# Patient Record
Sex: MALE | Race: BLACK OR AFRICAN AMERICAN
[De-identification: names, ages, dates, MRNs, and addresses within clinical notes are randomized per-mention and may not be internally consistent; named-entity substitution may affect disease eponyms.]

---

## 2019-06-08 ENCOUNTER — HOSPITAL ENCOUNTER (INPATIENT)
Dept: HOSPITAL 74 - JER | Age: 64
LOS: 6 days | Discharge: HOME | DRG: 377 | End: 2019-06-14
Attending: INTERNAL MEDICINE | Admitting: INTERNAL MEDICINE
Payer: COMMERCIAL

## 2019-06-08 VITALS — BODY MASS INDEX: 25 KG/M2

## 2019-06-08 DIAGNOSIS — K85.90: ICD-10-CM

## 2019-06-08 DIAGNOSIS — D64.9: ICD-10-CM

## 2019-06-08 DIAGNOSIS — K64.8: ICD-10-CM

## 2019-06-08 DIAGNOSIS — I10: ICD-10-CM

## 2019-06-08 DIAGNOSIS — F32.9: ICD-10-CM

## 2019-06-08 DIAGNOSIS — F41.9: ICD-10-CM

## 2019-06-08 DIAGNOSIS — K92.2: Primary | ICD-10-CM

## 2019-06-08 DIAGNOSIS — D62: ICD-10-CM

## 2019-06-08 DIAGNOSIS — R00.0: ICD-10-CM

## 2019-06-08 DIAGNOSIS — K63.5: ICD-10-CM

## 2019-06-08 DIAGNOSIS — F10.20: ICD-10-CM

## 2019-06-08 DIAGNOSIS — K57.30: ICD-10-CM

## 2019-06-08 DIAGNOSIS — K29.70: ICD-10-CM

## 2019-06-08 LAB
ALBUMIN SERPL-MCNC: 2.2 G/DL (ref 3.4–5)
ALP SERPL-CCNC: 38 U/L (ref 45–117)
ALT SERPL-CCNC: 13 U/L (ref 13–61)
ANION GAP SERPL CALC-SCNC: 9 MMOL/L (ref 8–16)
AST SERPL-CCNC: 20 U/L (ref 15–37)
BASOPHILS # BLD: 0.4 % (ref 0–2)
BASOPHILS # BLD: 0.6 % (ref 0–2)
BILIRUB SERPL-MCNC: 0.3 MG/DL (ref 0.2–1)
BUN SERPL-MCNC: 22.7 MG/DL (ref 7–18)
CALCIUM SERPL-MCNC: 7.4 MG/DL (ref 8.5–10.1)
CHLORIDE SERPL-SCNC: 107 MMOL/L (ref 98–107)
CO2 SERPL-SCNC: 22 MMOL/L (ref 21–32)
CREAT SERPL-MCNC: 1.3 MG/DL (ref 0.55–1.3)
DEPRECATED RDW RBC AUTO: 17.4 % (ref 11.9–15.9)
DEPRECATED RDW RBC AUTO: 18.2 % (ref 11.9–15.9)
EOSINOPHIL # BLD: 0.7 % (ref 0–4.5)
EOSINOPHIL # BLD: 0.9 % (ref 0–4.5)
GLUCOSE SERPL-MCNC: 169 MG/DL (ref 74–106)
HCT VFR BLD CALC: 11.9 % (ref 35.4–49)
HCT VFR BLD CALC: 17.6 % (ref 35.4–49)
HGB BLD-MCNC: 4 GM/DL (ref 11.7–16.9)
HGB BLD-MCNC: 5.9 GM/DL (ref 11.7–16.9)
INR BLD: 1.23 (ref 0.83–1.09)
LIPASE SERPL-CCNC: 4132 U/L (ref 73–393)
LYMPHOCYTES # BLD: 16.3 % (ref 8–40)
LYMPHOCYTES # BLD: 17.9 % (ref 8–40)
MCH RBC QN AUTO: 30 PG (ref 25.7–33.7)
MCH RBC QN AUTO: 31.4 PG (ref 25.7–33.7)
MCHC RBC AUTO-ENTMCNC: 33.2 G/DL (ref 32–35.9)
MCHC RBC AUTO-ENTMCNC: 33.9 G/DL (ref 32–35.9)
MCV RBC: 90.5 FL (ref 80–96)
MCV RBC: 92.7 FL (ref 80–96)
MONOCYTES # BLD AUTO: 6.4 % (ref 3.8–10.2)
MONOCYTES # BLD AUTO: 7.8 % (ref 3.8–10.2)
NEUTROPHILS # BLD: 73.2 % (ref 42.8–82.8)
NEUTROPHILS # BLD: 75.8 % (ref 42.8–82.8)
PLATELET # BLD AUTO: 186 K/MM3 (ref 134–434)
PLATELET # BLD AUTO: 371 K/MM3 (ref 134–434)
PLATELET BLD QL SMEAR: ADEQUATE
PMV BLD: 8.2 FL (ref 7.5–11.1)
PMV BLD: 8.5 FL (ref 7.5–11.1)
POTASSIUM SERPLBLD-SCNC: 3.8 MMOL/L (ref 3.5–5.1)
PROT SERPL-MCNC: 4.6 G/DL (ref 6.4–8.2)
PT PNL PPP: 14.5 SEC (ref 9.7–13)
RBC # BLD AUTO: 1.28 M/MM3 (ref 4–5.6)
RBC # BLD AUTO: 1.95 M/MM3 (ref 4–5.6)
SODIUM SERPL-SCNC: 138 MMOL/L (ref 136–145)
WBC # BLD AUTO: 12.4 K/MM3 (ref 4–10)
WBC # BLD AUTO: 9.8 K/MM3 (ref 4–10)

## 2019-06-08 PROCEDURE — P9038 RBC IRRADIATED: HCPCS

## 2019-06-08 PROCEDURE — 30233N1 TRANSFUSION OF NONAUTOLOGOUS RED BLOOD CELLS INTO PERIPHERAL VEIN, PERCUTANEOUS APPROACH: ICD-10-PCS | Performed by: INTERNAL MEDICINE

## 2019-06-08 PROCEDURE — P9058 RBC, L/R, CMV-NEG, IRRAD: HCPCS

## 2019-06-08 RX ADMIN — SODIUM CHLORIDE, POTASSIUM CHLORIDE, SODIUM LACTATE AND CALCIUM CHLORIDE SCH MLS/HR: 600; 310; 30; 20 INJECTION, SOLUTION INTRAVENOUS at 23:02

## 2019-06-08 RX ADMIN — SODIUM CHLORIDE, POTASSIUM CHLORIDE, SODIUM LACTATE AND CALCIUM CHLORIDE SCH MLS/HR: 600; 310; 30; 20 INJECTION, SOLUTION INTRAVENOUS at 13:11

## 2019-06-08 RX ADMIN — PANTOPRAZOLE SODIUM SCH MLS/HR: 40 INJECTION, POWDER, FOR SOLUTION INTRAVENOUS at 10:05

## 2019-06-08 RX ADMIN — PANTOPRAZOLE SODIUM SCH MLS/HR: 40 INJECTION, POWDER, FOR SOLUTION INTRAVENOUS at 13:33

## 2019-06-08 RX ADMIN — MUPIROCIN SCH APPLIC: 20 OINTMENT TOPICAL at 23:00

## 2019-06-08 RX ADMIN — TRAZODONE HYDROCHLORIDE SCH MG: 100 TABLET ORAL at 22:54

## 2019-06-08 RX ADMIN — CHLORHEXIDINE GLUCONATE SCH APPLIC: 213 SOLUTION TOPICAL at 22:59

## 2019-06-08 RX ADMIN — MUPIROCIN SCH APPLIC: 20 OINTMENT TOPICAL at 23:01

## 2019-06-08 NOTE — PDOC
Attending Attestation





- Resident


Resident Name: Juan Bird





- ED Attending Attestation


I have performed the following: I have examined & evaluated the patient, The 

case was reviewed & discussed with the resident, I agree w/resident's findings 

& plan, Exceptions are as noted





- HPI


HPI: 





06/08/19 09:07


63 years old with past medical history significant for hypertension and anxiety 

no previous history of GI bleed not on any anticoagulation presents to the 

emergency Department with three-day history of bright red blood per rectum. 

Blood is mixed with stool approximately 1-2 episodes per day. Patient now 

feeling lightheaded with some chest discomfort and generalized weakness. 

Symptoms are moderate persistent constant with no exacerbating or relieving 

factors. No history of similar.








- Physicial Exam


PE: 





06/08/19 09:07








Vitals: Triage Vital signs reviewed  


General Appearance:  no acute distress, well nourished well developed, 


Neck:  Supple;No Nucal rigidity


Chest Wall: Nontender


Cardiac: Regular rate and rhythym, no murmurs, no rubs, no gallops, 


Lungs: Clear to auscultation bilateral, good air movement bilaterally,


Abdomen:  Soft, non distended, normal bowel sounds, non tender to palpation


Rectal: Bright red blood per rectum mixed with dark stool


Extremities: Full range of motion to all extremities, no cyanosis, clubbing, or 

edema


Skin:  Warm and dry, no rashes or lesions, no rash, no petechiae


Psych:  normal mood, normal affect








- Critical Care Time


Total Critical Care Time: 35


Critical Care Statement: The care of this patient involved high complexity 

decision making to prevent further life threatening deterioration of the patient

's condition and/or to evaluate & treat vital organ system(s) failure or risk 

of failure.





- Medical Decision Making





06/08/19 09:08





History examination consistent with lower GI bleed





2 IVs placed





Patient noted to be tachycardic





Labs notable for hemoglobin of 4.





2 units PRBCs ordered





GI consultation placed, patient [facility] no previous 

colonoscopy or endoscopy in the past.





ICU consulted for higher level monitoring








**Heart Score/ECG Review





- ECG Impressions


Comment:: 





06/08/19 14:48


EKG performed at 7:55 AM demonstrates sinus tachycardia 113 bpm. No ST 

elevations or T-wave inversions will access. ME interval 150, QRS 72, QTC 45.





Interpreted by me.

## 2019-06-08 NOTE — CON.GI
Consult





- History of Present Illness


History of Present Illness: 





GI CONSULT DICTATED 





- NPO / IVF'S 


- PPI INFUSION 


- TRANSFUSE PRBC TO HG OF 9 -10 


- POST TRANSFUSION CBC ; SERIAL CBC 


- ICU CONSULT 


- WILL PLAN FOR DIAGNOSTIC EGD ONCE THE PATIENT IS ADEQUATELY RESUSCITATED





SEE FULL CONSULT.








- Alcohol/Substance Use


Hx Alcohol Use: Yes





- Smoking History


Smoking history: Never smoked


Have you smoked in the past 12 months: No





Home Medications





- Allergies


Allergies/Adverse Reactions: 


 Allergies











Allergy/AdvReac Type Severity Reaction Status Date / Time


 


No Known Allergies Allergy   Verified 06/08/19 07:46














- Home Medications


Home Medications: 


Ambulatory Orders





Unobtainable  06/08/19 











Physical Exam-GI


Vital Signs: 


 Vital Signs











Temperature  98.3 F   06/08/19 07:27


 


Pulse Rate  105 H  06/08/19 09:30


 


Respiratory Rate  18   06/08/19 09:30


 


Blood Pressure  98/56 L  06/08/19 09:30


 


O2 Sat by Pulse Oximetry (%)  100   06/08/19 07:27











Labs: 


 CBC, BMP





 06/08/19 07:49 





 06/08/19 07:49 





 INR, PTT











INR  1.23  (0.83-1.09)  H  06/08/19  07:49

## 2019-06-08 NOTE — HP
CHIEF COMPLAINT: dizziness, fatigue, dark stools with bright red blood





[facility]





HISTORY OF PRESENT ILLNESS:


63 yom with PMHx of HTN, anxiety, major depression requiring inpatient psych 7 

years ago, Alcohol abuse (sips vodka through the day), comes with onset of dark 

stools 2 days ago, Has been feeling weak and fatigued, with poor oral intake 

over last 2 days. Today when to the bathroom noted dark stools with massive 

bloody BM, felt severely weak, fatigued, slipped on his own blood, so came to 

the ED. Denies any head trauma.


Patient reports long standing h/o ETOH abuse dating back 2012, was in rehab few 

months ago, but relapsed. Reports upper endoscopy within the last 1 year, 

reportedly unremarkable which he had for GERD like symptoms. Denies any NSAIDS 

use. 


Reports some epigastric discomfort today. 


Reports last colonoscopy in 2012, no famHx of GI malignancy. 


12 point ROS done, as above, Denies any SI/HI currently. No fevers/chills, 

urinary symptoms. 





ER course was notable for:


(1) 2 large bore IVs,


(2) PRBC


(3) GI and ICU consult





Recent Travel: Denies





PAST MEDICAL HISTORY:  HTN, Anxiety, Major depression,  Alcohol abuse





PAST SURGICAL HISTORY: Denies





Social History:


Smoking: remote few cig a day from teens to 1989


Alcohol: "sips vodka throughout the day" 


Drugs: sniffed crack cocaine years ago, none since 2007


Lives alone, no family around





Family History:


Allergies





No Known Allergies Allergy (Verified 06/08/19 07:46)


 


HOME MEDICATIONS:


REVIEW OF SYSTEMS


12 point ROS done, per HPI








PHYSICAL EXAMINATION


 Vital Signs - 24 hr











  06/08/19 06/08/19





  07:27 09:30


 


Temperature 98.3 F 


 


Pulse Rate 112 H 


 


Pulse Rate [  105 H





Left Radial]  


 


Respiratory 16 18





Rate  


 


Blood Pressure 111/56 L 


 


Blood Pressure  98/56 L





[Right Arm]  


 


O2 Sat by Pulse 100 





Oximetry (%)  








 Intake & Output











 06/05/19 06/06/19 06/07/19 06/08/19





 23:59 23:59 23:59 23:59


 


Weight    190 lb











GENERAL: Awake,pos pallor, weak looking, fatigued, short of breath with minimal 

activity


HEAD: Normal with no signs of trauma.


EYES: Pupils equal, round and reactive to light, extraocular movements intact, 

sclera anicteric, conjunctiva clear. No lid lag.


EARS, NOSE, THROAT: Ears normal, nares patent, oropharynx clear without 

exudates. dry mucous membrane


NECK: Normal range of motion, supple, no JVD


LUNGS: Breath sounds equal, clear to auscultation bilaterally. No wheezes, and 

no crackles. No accessory muscle use.


HEART: S1S2 regular tachycardic


ABDOMEN: Soft, mild epigastric tenderness, no voluntary or involuntary guarding 

or rigidity, pos bowel sounds


RECTAL: Dried blood across the thigh, multiple large clots with fresh blood in 

the rectal area, dark stools, some ck-rectal erythema


MUSCULOSKELETAL: Normal range of motion at all joints. No bony deformities or 

tenderness. No CVA tenderness.


UPPER EXTREMITIES: 2+ pulses, warm, well-perfused. No cyanosis. No clubbing. No 

peripheral edema.


LOWER EXTREMITIES: 2+ pulses, warm, well-perfused. No calf tenderness. No 

peripheral edema. 


NEUROLOGICAL:  AAOx3, Cranial nerves II-XII grossly intact. Normal speech. Gait 

not observed


PSYCHIATRIC: Cooperative. Good eye contact. Appropriate mood and affect.


SKIN: Warm, dry, decreased turgor, no rashes or lesions noted, normal capillary 

refill. 


 Laboratory Results - last 24 hr











  06/08/19 06/08/19 06/08/19





  07:35 07:49 07:49


 


WBC   9.8 


 


RBC   1.28 L 


 


Hgb   4.0 L* 


 


Hct   11.9 L 


 


MCV   92.7 


 


MCH   31.4 


 


MCHC   33.9 


 


RDW   18.2 H 


 


MPV   8.5 


 


Absolute Neuts (auto)   7.5 


 


Neutrophils %   75.8 


 


Lymphocytes %   16.3 


 


Monocytes %   6.4 


 


Eosinophils %   0.9 


 


Basophils %   0.6 


 


Nucleated RBC %   0 


 


PT with INR    14.50 H


 


INR    1.23 H


 


Sodium   


 


Potassium   


 


Chloride   


 


Carbon Dioxide   


 


Anion Gap   


 


BUN   


 


Creatinine   


 


Est GFR (CKD-EPI)AfAm   


 


Est GFR (CKD-EPI)NonAf   


 


Random Glucose   


 


Calcium   


 


Total Bilirubin   


 


AST   


 


ALT   


 


Alkaline Phosphatase   


 


Creatine Kinase   


 


Troponin I   


 


Total Protein   


 


Albumin   


 


Stool Occult Blood  Positive  


 


Blood Type   


 


Antibody Screen   


 


Crossmatch   














  06/08/19 06/08/19 06/08/19





  07:49 07:49 09:21


 


WBC   


 


RBC   


 


Hgb   


 


Hct   


 


MCV   


 


MCH   


 


MCHC   


 


RDW   


 


MPV   


 


Absolute Neuts (auto)   


 


Neutrophils %   


 


Lymphocytes %   


 


Monocytes %   


 


Eosinophils %   


 


Basophils %   


 


Nucleated RBC %   


 


PT with INR   


 


INR   


 


Sodium  138  


 


Potassium  3.8  


 


Chloride  107  


 


Carbon Dioxide  22  


 


Anion Gap  9  


 


BUN  22.7 H  


 


Creatinine  1.3  


 


Est GFR (CKD-EPI)AfAm  67.78  


 


Est GFR (CKD-EPI)NonAf  58.48  


 


Random Glucose  169 H  


 


Calcium  7.4 L  


 


Total Bilirubin  0.3  


 


AST  20  


 


ALT  13  


 


Alkaline Phosphatase  38 L  


 


Creatine Kinase  101  


 


Troponin I  < 0.02  


 


Total Protein  4.6 L  


 


Albumin  2.2 L  


 


Stool Occult Blood   


 


Blood Type   A POSITIVE  A POSITIVE


 


Antibody Screen   Negative 


 


Crossmatch   See Detail 








CXR - no acute process


EKG sinus tach 112





ASSESSMENT/PLAN:


63 yom with PMHx of HTN, ETOH abuse, Anxiety, major depression admitted with 

acute gastrointestinal haemorrhage with hemodynamic instability.





-Acute massive gastrointestinal haemorrhage, Esophageal varices vs PUD, r/o mass

, vs AVM, low suspicion for diverticular, haemorrhoidal


-Acute severe blood loss anemia


-h/o HTN


-Anxiety


-Major depression


-h/o Suicidal ideation requiring inpatient psychiatric hospitalization





Plan:


2 large bore IVs,


Protonix/Octreotide drip, Admit to ICU.


Transfuse 2 units PRBC, Aggressive volume resuscitation.


Case discussed in detail with GI Dr. Álvarez at 11:14 AM, notified of current 

rectal exam, Hb 4, my concerns for ongoing gastrointestinal haemorrhage with 

severe blood loss, ongoing hemodynamic concerns with tachycardia and SBP 90s-

100s in this patient with known h/o HTN and significant h/o ETOH abuse and 

urgency of the situation was expressed. 


CT A/P with contrast (discussed with ED Dr Bird)


Currently on nifedipine/metoprolol/buspiron/buproprion, unsure of doses. Hold 

for now


DVTPPX SCDs


Dispo Admit to ICU.


Plan discussed with patient in detail, all questions answered. 


Care co-ordinated with ED and GI


Total critical care time spent 65 min. 











Visit type





- Emergency Visit


Emergency Visit: Yes


ED Registration Date: 06/08/19


Care time: The patient presented to the Emergency Department on the above date 

and was hospitalized for further evaluation of their emergent condition.





- New Patient


This patient is new to me today: Yes


Date on this admission: 06/08/19





- Critical Care


Critical Care patient: Yes


Total Critical Care Time (in minutes): 65


Critical Care Statement: The care of this patient involved high complexity 

decision making to prevent further life threatening deterioration of the patient

's condition and/or to evaluate & treat vital organ system(s) failure or risk 

of failure.

## 2019-06-08 NOTE — CONSULT
Consultation: 


REQUESTING PROVIDER: Dr. De Luna





CONSULT REQUEST: GI bleed





HISTORY OF PRESENT ILLNESS:





63 year old male with a history of hypertension, anxiety, alcohol abuse, and 

questionable [facility] presents to [facility] hospital 

for a 3 day history of bright red blood per rectum. Patient reports that he 

began seeing blood around the stool on Thursday with every bowel movement. 

States that since then, his bowel movements are composed of solid stools and he 

denies overt blood in the toilet. States that this has never happened to him 

before. Reports mild epigastric abdominal pain, lightheadedness and weakness. 

Denies any nausea, vomiting, diarrhea, fevers or chills. Denies any NSAID use 

or liver pathology in the past. Reports drinking 1 liter of vodka every day for 

many years. Denies family history of colon cancer.





Reports having an EGD/colonoscopy in 2011 which was normal.





[facility] and states that he had fluid in 

the lung, for which he was tapped, but he does not recall his diagnosis.





Denies Smoking


Former user of cocaine, hasn't used in many years


Daily alcohol 1 L of vodka, in a rehab program


Surgeries: Hip replacement, no abdominal surgeries


Occupation: former  and , now retired.








REVIEW OF SYSTEMS:


CONSTITUTIONAL: generalized weakness


Absent:  fever, chills, diaphoresis,  malaise, loss of appetite, weight change


HEENT: 


Absent:  rhinorrhea, nasal congestion, throat pain, throat swelling, difficulty 

swallowing, mouth swelling, ear pain, eye pain, visual changes


CARDIOVASCULAR: 


Absent: chest pain, syncope, palpitations, irregular heart rate, lightheadedness

, peripheral edema


RESPIRATORY: 


Absent: cough, shortness of breath, dyspnea with exertion, orthopnea, wheezing, 

stridor, hemoptysis


GASTROINTESTINAL: abdominal pain, hematochezia


Absent: abdominal distension, nausea, vomiting, diarrhea, constipation, melena


GENITOURINARY: 


Absent: dysuria, frequency, urgency, hesitancy, hematuria, flank pain, genital 

pain


MUSCULOSKELETAL: 


Absent: myalgia, arthralgia, joint swelling, back pain, neck pain


SKIN: 


Absent: rash, itching, pallor


HEMATOLOGIC/IMMUNOLOGIC: 


Absent: easy bleeding, easy bruising, lymphadenopathy, frequent infections


ENDOCRINE:


Absent: unexplained weight gain, unexplained weight loss, heat intolerance, 

cold intolerance


NEUROLOGIC: 


Absent: headache, focal weakness or paresthesias, dizziness, unsteady gait, 

seizure, mental status changes, bladder or bowel incontinence


PSYCHIATRIC: 


Absent: anxiety, depression, suicidal or homicidal ideation, hallucinations.





PHYSICAL EXAMINATION





 Vital Signs - 24 hr











  06/08/19 06/08/19





  07:27 09:30


 


Temperature 98.3 F 


 


Pulse Rate 112 H 


 


Pulse Rate [  105 H





Left Radial]  


 


Respiratory 16 18





Rate  


 


Blood Pressure 111/56 L 


 


Blood Pressure  98/56 L





[Right Arm]  


 


O2 Sat by Pulse 100 





Oximetry (%)  














GENERAL: A&Ox3, no acute distress


EYES: PERRLA, EOMI, sclera pale


ENT: Dry mucus membranes, no blood in the pharynx


NECK: No JVD


LUNGS: CTA, no wheezes


HEART: tachycardic, no murmurs


ABDOMEN: Soft, mildly tender to palpation in the epigastrum, BS present


MUSCULOSKELETAL: No CVA Tenderness


EXTREMITIES: 2+ pulses, no edema.


NEUROLOGICAL:  Cranial nerves II-XII intact, no focal deficits


RECTAL: blood in rectal vault, no hemorrhoids noted











 Laboratory Results - last 24 hr











  06/08/19 06/08/19 06/08/19





  07:35 07:49 07:49


 


WBC   9.8 


 


RBC   1.28 L 


 


Hgb   4.0 L* 


 


Hct   11.9 L 


 


MCV   92.7 


 


MCH   31.4 


 


MCHC   33.9 


 


RDW   18.2 H 


 


MPV   8.5 


 


Absolute Neuts (auto)   7.5 


 


Neutrophils %   75.8 


 


Lymphocytes %   16.3 


 


Monocytes %   6.4 


 


Eosinophils %   0.9 


 


Basophils %   0.6 


 


Nucleated RBC %   0 


 


PT with INR    14.50 H


 


INR    1.23 H


 


Sodium   


 


Potassium   


 


Chloride   


 


Carbon Dioxide   


 


Anion Gap   


 


BUN   


 


Creatinine   


 


Est GFR (CKD-EPI)AfAm   


 


Est GFR (CKD-EPI)NonAf   


 


Random Glucose   


 


Calcium   


 


Total Bilirubin   


 


AST   


 


ALT   


 


Alkaline Phosphatase   


 


Total Protein   


 


Albumin   


 


Stool Occult Blood  Positive  


 


Blood Type   


 


Antibody Screen   


 


Crossmatch   














  06/08/19 06/08/19 06/08/19





  07:49 07:49 09:21


 


WBC   


 


RBC   


 


Hgb   


 


Hct   


 


MCV   


 


MCH   


 


MCHC   


 


RDW   


 


MPV   


 


Absolute Neuts (auto)   


 


Neutrophils %   


 


Lymphocytes %   


 


Monocytes %   


 


Eosinophils %   


 


Basophils %   


 


Nucleated RBC %   


 


PT with INR   


 


INR   


 


Sodium  138  


 


Potassium  3.8  


 


Chloride  107  


 


Carbon Dioxide  22  


 


Anion Gap  9  


 


BUN  22.7 H  


 


Creatinine  1.3  


 


Est GFR (CKD-EPI)AfAm  67.78  


 


Est GFR (CKD-EPI)NonAf  58.48  


 


Random Glucose  169 H  


 


Calcium  7.4 L  


 


Total Bilirubin  0.3  


 


AST  20  


 


ALT  13  


 


Alkaline Phosphatase  38 L  


 


Total Protein  4.6 L  


 


Albumin  2.2 L  


 


Stool Occult Blood   


 


Blood Type   A POSITIVE  A POSITIVE


 


Antibody Screen   Negative 


 


Crossmatch   See Detail 








Active Medications











Generic Name Dose Route Start Last Admin





  Trade Name Freq  PRN Reason Stop Dose Admin


 


Chlorhexidine Gluconate  1 applic  06/08/19 22:00  





  Hibiclens For Decolonization -  TP   





  HS SILVANA   





     





     





     





     


 


Pantoprazole Sodium 80 mg/  100 mls @ 10 mls/hr  06/08/19 09:30  06/08/19 10:05





  Sodium Chloride  IVPB   10 mls/hr





  Q10H SILVANA   Administration





     





     





     





  8 MG/HR   


 


Octreotide Acetate 200 mcg/  500 mls @ 20.833 mls/hr  06/08/19 11:00  





Octreotide Acetate 1,000 mcg/  IVPB   





Dextrose  ASDIR SILVANA   





     





     





     





     


 


Lactated Ringer's  1,000 ml in 1,000 mls @ 125 mls/hr  06/08/19 11:00  





  Lactated Ringers Solution  IV   





  ASDIR SILVANA   





     





     





     





     


 


Mupirocin  1 applic  06/08/19 22:00  





  Bactroban Ointment (For Decolonization) -  NS  06/13/19 21:59  





  BID SILVANA   





     





     





     





     











ASSESSMENT/PLAN:





63 year old male with a history of hypertension, anxiety, alcohol abuse, and 

questionable pleural [facility] presents to the hospital 

for a 3 day history of bright red blood per rectum. 





#Neuro


-A&O, no neurologic issues at present


-CIWA 0, monitor for withdrawal symptoms





#Cardiovascular


-tachycardic, likely 2/2 acute blood loss anemia from GI bleed


-EKG shows sinus tachycardia with a rate of 113, QTc 485


-patient has a history of hypertension, would hold antihypertensives in setting 

of GI bleed





#Pulmonary


-no active issues


-patient reportedly had [facility] month ago for fluid in the 

lungs, we do not have records of this





#Gastrointestinal: 


-patient has an acute GI bleed, possibly a brisk upper GI bleed from varices 

due to alcohol abuse/cirrhosis vs peptic ulcer disease


-two large bore IVs


-hemodynamically stable at present, monitor blood pressures


-aggressive fluid resuscitation


-2U PRBCs ordered


-type and screen/coags done


-repeat H&H after first and second unit


-protonix ggt


-octreotide ggt


-ICU monitoring


-GI consulted





#Prophylaxis


-recommend SCDS





Dispo: We will continue to follow the patient. Thank you for this consultative 

opportunity.











Visit type





- Emergency Visit


Emergency Visit: Yes


ED Registration Date: 06/08/19


Care time: The patient presented to the Emergency Department on the above date 

and was hospitalized for further evaluation of their emergent condition.





- New Patient


This patient is new to me today: Yes


Date on this admission: 06/08/19





- Critical Care


Critical Care patient: Yes


Total Critical Care Time (in minutes): 36


Critical Care Statement: The care of this patient involved high complexity 

decision making to prevent further life threatening deterioration of the patient

's condition and/or to evaluate & treat vital organ system(s) failure or risk 

of failure.

## 2019-06-08 NOTE — PDOC
History of Present Illness





- General


Chief Complaint: Rectal Bleed


Stated Complaint: RECTAL BLEEDING


Time Seen by Provider: 06/08/19 07:39





- History of Present Illness


Initial Comments: 





06/08/19 08:56


The patient is a 63 year old male with a history of HTN, anxiety who presents 

for evaluation of rectal bleeding.  The patient notes a 3 day history of bright 

red in his stool with black colored stool as well.  He notes that he has been 

experiencing blood in his stool with every bowel movement.  He states that today

, he began feeling extremely fatigued prompting his presentation to the ED for 

further evaluation.  He denies ever experiencing rectal bleeding in the past 

and otherwise denies fevers, chills, SOB, chest pain, nausea, vomiting, 

abdominal pain, or changes with urination.  Of note, the patient reports almost 

daily alcohol use but is unsure of the amount of alcohol.





Past History





- Past Medical History


Allergies/Adverse Reactions: 


 Allergies











Allergy/AdvReac Type Severity Reaction Status Date / Time


 


No Known Allergies Allergy   Verified 06/08/19 07:46











COPD: No


HTN: Yes


Psychiatric Problems: Yes (anxiety)


Other medical history: ETOH





- Suicide/Smoking/Psychosocial Hx


Smoking History: Never smoked


Have you smoked in the past 12 months: No


Information on smoking cessation initiated: No


Hx Alcohol Use: Yes


Drug/Substance Use Hx: No





**Review of Systems





- Review of Systems


Comments:: 





06/08/19 09:15


Constitutional: Fatigue.  No fevers, chills, malaise


HEENT: No Rhinorrhea, nasal congestion, visual changes


Cardiovascular: No chest pain, syncope, palpitations, lightheadedness


Respiratory: No Cough, SOB, Hemoptysis,


Gastrointestinal: BRBPR, Melena.  No Abdominal pain, Nausea, Vomiting, 

Constipation, Diarrhea,


Genitourinary: No Dysuria, Frequency, Urgency, Hesitancy, Hematuria, Flank pain


Musculoskeletal: No Myalgia, arthralgia


Skin: Pallor.  No rashes, itching, bruising, 


Neurologic: No Headache, Dizziness, Numbness, Weakness, or Tingling


Psychiatric: No Hallucinations. No SI or HI








*Physical Exam





- Vital Signs


 Last Vital Signs











Temp Pulse Resp BP Pulse Ox


 


 98.3 F   112 H  16   111/56 L  100 


 


 06/08/19 07:27  06/08/19 07:27  06/08/19 07:27  06/08/19 07:27  06/08/19 07:27














- Physical Exam


Comments: 





06/08/19 09:16


General Appearance: Nourished. No Apparent Distress


HEENT: EOMI, INDU. Conjuctival pallor noted on exam.   No Pharyngeal Erythema, 

Tonsillar Exudate, Tonsillar Erythema


Neck: No Cervical Lymphadenopathy


Respiratory/Chest: Lungs Clear, Normal Breath Sounds. No Crackles, Rales, 

Rhonchi, Wheezing


Cardiovascular: Regular Rhythm, Tachycardic  Rate. No Murmur, Gallops, Rubs


Gastrointestinal/Abdominal: Normal Bowel Sounds, Soft. No Guarding, Rebound, 

Tenderness


Rectal Exam: Significant amount of dark red blood noted around the rectum.  

Dark red stool on rectal exam.  No notable internal or external hemorrhoids.  

No fissures noted.


Musculoskeletal: No CVA Tenderness


Extremity: Normal Capillary Refill


Integumentary: Pallor noted on exam.  Dry, Warm


Neurologic:  Fully Oriented, Alert, Normal Mood/Affect, Normal Response, 





**Heart Score/ECG Review


  ** #1


ECG reviewed & interpreted by me at: 10:22





06/08/19 10:22


Sinus Tachycardic


No acute ST Changes








QRS 72


QTc 485





ED Treatment Course





- LABORATORY


CBC & Chemistry Diagram: 


 06/08/19 07:49





 06/08/19 07:49





Medical Decision Making





- Medical Decision Making





06/08/19 09:19


The patient is a 63 year old male with a history of HTN, anxiety who presents 

for evaluation of rectal bleeding.  Differential includes but is not limited to

: GI bleed, Anemia, Infectious, Metabolic Derangement.  Given the patient's 

history and physical exam, we will obtain a cbc, cmp, coags, type and screen, 

stool of occult blood, ekg, chest plain film to evaluate further.  We will 

continue to monitor and reassess while here in the ED.





06/08/19 09:37


CBC demonstrates a hgb of 4.  CMP is unremarkable.  Stool for occult blood is 

positive.  Chest plain film is unremarkable as read by our radiologist.  The 

patient's symptoms are likely due to symptomatic anemia from a GI bleed.  Given 

the patient's vital signs abnormalities and significant anemia, the patient 

will require admission with ICU monitoring.  We discussed the case with Dr. Álvarez with GI who is aware of the case and recommends a protonix drip.  We 

will transfuse the patient with 2 units of PRBC.  We will continue to monitor 

and reassess while here in the ED.





06/08/19 11:06


We discussed the case with the ICU team who accepted the patient for ICU.  We 

discussed the case with the admitting team who accepted the patient for 

admission.





*DC/Admit/Observation/Transfer


Diagnosis at time of Disposition: 


GI bleed


Qualifiers:


 GI bleed type/associated pathology: unspecified gastrointestinal hemorrhage 

type Qualified Code(s): K92.2 - Gastrointestinal hemorrhage, unspecified





Anemia


Qualifiers:


 Anemia type: unspecified type Qualified Code(s): D64.9 - Anemia, unspecified








- Discharge Dispostion


Condition at time of disposition: Guarded


Decision to Admit order: Yes





- Referrals





- Patient Instructions





- Post Discharge Activity

## 2019-06-08 NOTE — EKG
Test Reason : 

Blood Pressure : ***/*** mmHG

Vent. Rate : 113 BPM     Atrial Rate : 113 BPM

   P-R Int : 150 ms          QRS Dur : 072 ms

    QT Int : 354 ms       P-R-T Axes : 056 046 055 degrees

   QTc Int : 485 ms

 

SINUS TACHYCARDIA

OTHERWISE NORMAL ECG

NO PREVIOUS ECGS AVAILABLE

Confirmed by [place] ELDER, HILDA ([id number]) on 6/8/2019 8:48:44 AM

 

Referred By:             Confirmed By:HILDA BOYLE MD

## 2019-06-08 NOTE — CONS
DATE OF CONSULTATION:  06/08/2019

 

The patient is a 63-year-old man with a past medical history of hypertension, 
anxiety

disorder, and major depression, also with alcohol abuse.  He says he drinks a 
small

amount of vodka daily apparently for at least the last 5 years.  He was in an

outpatient alcohol rehab facility in the past.  He presents to the hospital with

complaints of blood in the stool for the past couple of days.  He states he has

noticed some bright red blood and some dark stool.  Apparently after his last 
bloody

bowel movement he became very weak and slipped on his own blood, prompting him 
to

come to the emergency room for further evaluation.  While in the emergency room 
he

has not had any further episodes of melena or hematochezia.  Apparently he had 
an

upper endoscopy a year ago which he reports to be normal.  Also with a 
colonoscopy in

2012, which he also reports to be normal.  He admits to taking some aspirin 
over the

past couple of weeks but denies any other NSAID use.  He also complains of some

epigastric discomfort which has been ongoing for the past couple of weeks.  He 
denies

any nausea, vomiting or hematemesis or syncope or shortness of breath or chest 
pain.

 

PAST MEDICAL/SURGICAL HISTORY:  As listed in the HPI.

 

ALLERGIES:  No known drug allergies.  

 

SOCIAL HISTORY:  Smokes a few cigarettes daily, drinks alcohol.  The exact 
quantity

is not known.  He appears to be somewhat unreliable.  Drugs:  He used to use 
cocaine

in the past, none recently, and he lives alone.  

 

HOME MEDICATIONS:  Denies.  

 

REVIEW OF SYSTEMS:  As per the HPI.

 

PHYSICAL EXAMINATION:  

Vital Signs:  Temperature 98.5, pulse 102, blood pressure 113/74, respiratory 
rate

12.  Saturation 99% on room air. 

General:  No acute distress.

HEENT:  Anicteric sclerae.

Cardiovascular:  S1, S2, regular rate and rhythm.  

Lungs:  Bilateral clear to auscultation.

Abdomen:  Soft, with some tenderness to deep palpation in the epigastrium 
without any

rebound or guarding.  

Extremities:  No edema.  

Rectal:  Exam by the ER revealed some old blood.

 

LABORATORY:  White blood cell count 9.8, hemoglobin 4, hematocrit 11, MCV 92. 

Platelet count is pending.  Neutrophils 75.  INR 1.2.  Sodium 138, potassium 3.8
,

BUN/creatinine 22/1.3.  Glucose 169.  Calcium 7.4, AST 20, ALT 13, alkaline

phosphatase 38, troponin negative.  Albumin 2.2.  Lipase is pending.  Stool for

occult blood positive.  

 

Abdomen and pelvis CT scan was ordered and is pending results.  

 

IMPRESSION:  Severe anemia, melena, hematochezia an upper

gastrointestinal source of bleeding will need to be excluded secondary to 
peptic ulcer disease/ angioectasia/ varices considering he drinks alcohol.  
There is no sign of an overt gastrointestinal

bleed at this time.  Patient is hemodynamically stable.

 

RECOMMENDATION:  Follow up CT scan results.  Platelet count will need to be 
resulted.

 Protonix infusion.  Considering he drinks alcohol, would continue him also on 
an

octreotide drip, n.p.o., IV fluids, ICU consultation.  Transfuse PRBCs to 
hemoglobin

of 9.  Serial CBCs q.8 hours and a post transfusion CBC.  Once he is volume

resuscitated, will plan for an urgent endoscopy which can be done later today 
pending

his CT scan results.  Plan of care was discussed with the primary medical team. 

Will follow this patient closely with you.  

 

 

DO WISAM [id number]

DD: 06/08/2019 15:20

DT: 06/08/2019 15:54

Job #:  [id number]

MTDD

## 2019-06-08 NOTE — PN
Teaching Attending Note


Name of Resident: Andrew Leach





ATTENDING PHYSICIAN STATEMENT





I saw and evaluated the patient.


I reviewed the resident's note and discussed the case with the resident.


I agree with the resident's findings and plan as documented.








SUBJECTIVE:





Pt seen and examined in the ICU. Receiving 2nd unit PRBC. Denies shortness of 

breath, chest pain. Does report some epigastric discomfort.





OBJECTIVE:





 Vital Signs











 Period  Temp  Pulse  Resp  BP Sys/Goldstein  Pulse Ox


 


 Last 24 Hr  98.3 F-98.8 F  102-112  16-24  /56-76  








 Intake & Output











 06/05/19 06/06/19 06/07/19 06/08/19





 23:59 23:59 23:59 23:59


 


Weight    86.183 kg








Gen:  NAD but pale


Heart: tachycardic, regular


Lung: decreased breath sounds at the bases


Abd: soft, mild TTP epigastric region, no rebound


Ext: no edema





 CBC, BMP





 06/08/19 07:49 





 06/08/19 07:49 





Active Medications





Chlorhexidine Gluconate (Hibiclens For Decolonization -)  1 applic TP HS SILVANA


Pantoprazole Sodium 80 mg/ (Sodium Chloride)  100 mls @ 10 mls/hr IVPB Q10H SILVANA


   Last Admin: 06/08/19 13:33 Dose:  10 mls/hr


Lactated Ringer's (Lactated Ringers Solution)  1,000 ml in 1,000 mls @ 125 mls/

hr IV ASDIR SILVANA


   Last Admin: 06/08/19 13:11 Dose:  125 mls/hr


Octreotide Acetate 1,200 mcg/ (Dextrose)  500 mls @ 20.833 mls/hr IVPB ASDIR SILVANA


   Last Admin: 06/08/19 13:04 Dose:  20.833 mls/hr


Mupirocin (Bactroban Ointment (For Decolonization) -)  1 applic NS BID SILVANA


   Stop: 06/13/19 21:59








ASSESSMENT AND PLAN:


GI Bleed


Acute Blood Loss Anemia


Alcohol Abuse


Depression


HTN





-  transfuse PRBC


-  monitor H/H


-  protonix gtt


-  empiric octreotide gtt


-  IVF


-  NPO


-  ensure large bore peripheral access


-  monitor for withdrawal symptoms


-  GI for endoscopy


-  DVT prophylaxis


-  ICU monitoring

## 2019-06-09 LAB
ALBUMIN SERPL-MCNC: 2.1 G/DL (ref 3.4–5)
ALP SERPL-CCNC: 36 U/L (ref 45–117)
ALT SERPL-CCNC: 13 U/L (ref 13–61)
ANION GAP SERPL CALC-SCNC: 3 MMOL/L (ref 8–16)
AST SERPL-CCNC: 25 U/L (ref 15–37)
BILIRUB SERPL-MCNC: 1.9 MG/DL (ref 0.2–1)
BUN SERPL-MCNC: 17.7 MG/DL (ref 7–18)
CALCIUM SERPL-MCNC: 7.2 MG/DL (ref 8.5–10.1)
CHLORIDE SERPL-SCNC: 110 MMOL/L (ref 98–107)
CO2 SERPL-SCNC: 26 MMOL/L (ref 21–32)
CREAT SERPL-MCNC: 1.1 MG/DL (ref 0.55–1.3)
DEPRECATED RDW RBC AUTO: 17 % (ref 11.9–15.9)
DEPRECATED RDW RBC AUTO: 17.3 % (ref 11.9–15.9)
DEPRECATED RDW RBC AUTO: 17.7 % (ref 11.9–15.9)
GLUCOSE SERPL-MCNC: 115 MG/DL (ref 74–106)
HCT VFR BLD CALC: 20.5 % (ref 35.4–49)
HCT VFR BLD CALC: 21 % (ref 35.4–49)
HCT VFR BLD CALC: 22.7 % (ref 35.4–49)
HGB BLD-MCNC: 7 GM/DL (ref 11.7–16.9)
HGB BLD-MCNC: 7.2 GM/DL (ref 11.7–16.9)
HGB BLD-MCNC: 7.8 GM/DL (ref 11.7–16.9)
MAGNESIUM SERPL-MCNC: 1.7 MG/DL (ref 1.8–2.4)
MCH RBC QN AUTO: 29.8 PG (ref 25.7–33.7)
MCH RBC QN AUTO: 30 PG (ref 25.7–33.7)
MCH RBC QN AUTO: 30.1 PG (ref 25.7–33.7)
MCHC RBC AUTO-ENTMCNC: 34.2 G/DL (ref 32–35.9)
MCHC RBC AUTO-ENTMCNC: 34.3 G/DL (ref 32–35.9)
MCHC RBC AUTO-ENTMCNC: 34.6 G/DL (ref 32–35.9)
MCV RBC: 86.9 FL (ref 80–96)
MCV RBC: 87.1 FL (ref 80–96)
MCV RBC: 87.7 FL (ref 80–96)
PHOSPHATE SERPL-MCNC: 2.9 MG/DL (ref 2.5–4.9)
PLATELET # BLD AUTO: 148 K/MM3 (ref 134–434)
PLATELET # BLD AUTO: 174.6 K/MM3 (ref 134–434)
PLATELET # BLD AUTO: 240 K/MM3 (ref 134–434)
PMV BLD: 8 FL (ref 7.5–11.1)
PMV BLD: 8.2 FL (ref 7.5–11.1)
PMV BLD: 8.5 FL (ref 7.5–11.1)
POTASSIUM SERPLBLD-SCNC: 4.2 MMOL/L (ref 3.5–5.1)
PROT SERPL-MCNC: 4.3 G/DL (ref 6.4–8.2)
RBC # BLD AUTO: 2.36 M/MM3 (ref 4–5.6)
RBC # BLD AUTO: 2.41 M/MM3 (ref 4–5.6)
RBC # BLD AUTO: 2.59 M/MM3 (ref 4–5.6)
SODIUM SERPL-SCNC: 139 MMOL/L (ref 136–145)
WBC # BLD AUTO: 9 K/MM3 (ref 4–10)
WBC # BLD AUTO: 9.4 K/MM3 (ref 4–10)
WBC # BLD AUTO: 9.4 K/MM3 (ref 4–10)

## 2019-06-09 RX ADMIN — SODIUM CHLORIDE, POTASSIUM CHLORIDE, SODIUM LACTATE AND CALCIUM CHLORIDE SCH MLS/HR: 600; 310; 30; 20 INJECTION, SOLUTION INTRAVENOUS at 15:58

## 2019-06-09 RX ADMIN — SODIUM CHLORIDE, POTASSIUM CHLORIDE, SODIUM LACTATE AND CALCIUM CHLORIDE SCH MLS/HR: 600; 310; 30; 20 INJECTION, SOLUTION INTRAVENOUS at 07:13

## 2019-06-09 RX ADMIN — TRAZODONE HYDROCHLORIDE SCH MG: 100 TABLET ORAL at 21:51

## 2019-06-09 RX ADMIN — MUPIROCIN SCH APPLIC: 20 OINTMENT TOPICAL at 15:59

## 2019-06-09 RX ADMIN — CHLORHEXIDINE GLUCONATE SCH APPLIC: 213 SOLUTION TOPICAL at 21:51

## 2019-06-09 RX ADMIN — PANTOPRAZOLE SODIUM SCH MLS/HR: 40 INJECTION, POWDER, FOR SOLUTION INTRAVENOUS at 07:13

## 2019-06-09 RX ADMIN — MUPIROCIN SCH APPLIC: 20 OINTMENT TOPICAL at 21:51

## 2019-06-09 RX ADMIN — PANTOPRAZOLE SODIUM SCH: 40 INJECTION, POWDER, FOR SOLUTION INTRAVENOUS at 07:14

## 2019-06-09 RX ADMIN — PANTOPRAZOLE SODIUM SCH MLS/HR: 40 INJECTION, POWDER, FOR SOLUTION INTRAVENOUS at 15:29

## 2019-06-09 NOTE — PN
Physical Exam: 


SUBJECTIVE: Patient seen and examined at bedside- no acute events overnight; 

patient states that he is feeling OK just feeing a little tired and hungry  

however he denies ay CP/SOB/N/V; patient did have bloody stools last night 

according to nursing 








OBJECTIVE:





 Vital Signs











 Period  Temp  Pulse  Resp  BP Sys/Goldstein  Pulse Ox


 


 Last 24 Hr  98.1 F-98.9 F    16-24  /56-84  











GENERAL: The patient is awake, alert, and fully oriented, in no acute distress.


EYES: PEERLA: EOMI: no scleral icterus


NECK: no JVD; no lymphadenopathy 


LUNGS:CTA B/L; no rales, rhonchi or wheezing 


HEART: Regular rate and rhythm, S1, S2 without murmur, rub or gallop.


ABDOMEN: Soft, slight epigastric tenderness upon palpation; nondistended +BS in 

all 4 quadrants 


EXTREMITIES: 2+ pulses, warm, well-perfused, no edema. .


RECTAL: blood and stool present in rectal vault; no hemorrhoids/fissures or 

anal tears present











 Laboratory Results - last 24 hr











  06/08/19 06/08/19 06/08/19





  07:35 07:49 07:49


 


WBC   9.8 


 


RBC   1.28 L 


 


Hgb   4.0 L* 


 


Hct   11.9 L 


 


MCV   92.7 


 


MCH   31.4 


 


MCHC   33.9 


 


RDW   18.2 H 


 


Plt Count   371 


 


MPV   8.5 


 


Absolute Neuts (auto)   7.5 


 


Neutrophils %   75.8 


 


Lymphocytes %   16.3 


 


Monocytes %   6.4 


 


Eosinophils %   0.9 


 


Basophils %   0.6 


 


Nucleated RBC %   0 


 


Platelet Estimate   


 


Platelet Comment   


 


PT with INR    14.50 H


 


INR    1.23 H


 


Sodium   


 


Potassium   


 


Chloride   


 


Carbon Dioxide   


 


Anion Gap   


 


BUN   


 


Creatinine   


 


Est GFR (CKD-EPI)AfAm   


 


Est GFR (CKD-EPI)NonAf   


 


Random Glucose   


 


Lactic Acid   


 


Calcium   


 


Phosphorus   


 


Magnesium   


 


Total Bilirubin   


 


AST   


 


ALT   


 


Alkaline Phosphatase   


 


Creatine Kinase   


 


Troponin I   


 


Total Protein   


 


Albumin   


 


Lipase   


 


Stool Occult Blood  Positive  


 


Blood Type   


 


Antibody Screen   


 


Crossmatch   














  06/08/19 06/08/19 06/08/19





  07:49 07:49 09:21


 


WBC   


 


RBC   


 


Hgb   


 


Hct   


 


MCV   


 


MCH   


 


MCHC   


 


RDW   


 


Plt Count   


 


MPV   


 


Absolute Neuts (auto)   


 


Neutrophils %   


 


Lymphocytes %   


 


Monocytes %   


 


Eosinophils %   


 


Basophils %   


 


Nucleated RBC %   


 


Platelet Estimate   


 


Platelet Comment   


 


PT with INR   


 


INR   


 


Sodium  138  


 


Potassium  3.8  


 


Chloride  107  


 


Carbon Dioxide  22  


 


Anion Gap  9  


 


BUN  22.7 H  


 


Creatinine  1.3  


 


Est GFR (CKD-EPI)AfAm  67.78  


 


Est GFR (CKD-EPI)NonAf  58.48  


 


Random Glucose  169 H  


 


Lactic Acid   


 


Calcium  7.4 L  


 


Phosphorus   


 


Magnesium   


 


Total Bilirubin  0.3  


 


AST  20  


 


ALT  13  


 


Alkaline Phosphatase  38 L  


 


Creatine Kinase  101  


 


Troponin I  < 0.02  


 


Total Protein  4.6 L  


 


Albumin  2.2 L  


 


Lipase  4132 H  


 


Stool Occult Blood   


 


Blood Type   A POSITIVE  A POSITIVE


 


Antibody Screen   Negative 


 


Crossmatch   See Detail 














  06/08/19 06/09/19 06/09/19





  15:50 05:35 05:35


 


WBC  12.4 H  9.4 


 


RBC  1.95 L  2.41 L 


 


Hgb  5.9 L*  7.2 L 


 


Hct  17.6 L D  21.0 L D 


 


MCV  90.5  87.1 


 


MCH  30.0  30.1 


 


MCHC  33.2  34.6 


 


RDW  17.4 H  17.3 H 


 


Plt Count  186  D  


 


MPV  8.2  8.5 


 


Absolute Neuts (auto)  9.1 H  


 


Neutrophils %  73.2  


 


Lymphocytes %  17.9  


 


Monocytes %  7.8  


 


Eosinophils %  0.7  


 


Basophils %  0.4  


 


Nucleated RBC %  0  


 


Platelet Estimate  Adequate  


 


Platelet Comment  Slide scanned  


 


PT with INR   


 


INR   


 


Sodium    139


 


Potassium    4.2


 


Chloride    110 H


 


Carbon Dioxide    26


 


Anion Gap    3 L


 


BUN    17.7


 


Creatinine    1.1


 


Est GFR (CKD-EPI)AfAm    82.95


 


Est GFR (CKD-EPI)NonAf    71.57


 


Random Glucose    115 H


 


Lactic Acid   


 


Calcium    7.2 L


 


Phosphorus    2.9


 


Magnesium    1.7 L


 


Total Bilirubin    1.9 H


 


AST    25


 


ALT    13


 


Alkaline Phosphatase    36 L


 


Creatine Kinase   


 


Troponin I   


 


Total Protein    4.3 L


 


Albumin    2.1 L


 


Lipase   


 


Stool Occult Blood   


 


Blood Type   


 


Antibody Screen   


 


Crossmatch   














  06/09/19





  05:35


 


WBC 


 


RBC 


 


Hgb 


 


Hct 


 


MCV 


 


MCH 


 


MCHC 


 


RDW 


 


Plt Count 


 


MPV 


 


Absolute Neuts (auto) 


 


Neutrophils % 


 


Lymphocytes % 


 


Monocytes % 


 


Eosinophils % 


 


Basophils % 


 


Nucleated RBC % 


 


Platelet Estimate 


 


Platelet Comment 


 


PT with INR 


 


INR 


 


Sodium 


 


Potassium 


 


Chloride 


 


Carbon Dioxide 


 


Anion Gap 


 


BUN 


 


Creatinine 


 


Est GFR (CKD-EPI)AfAm 


 


Est GFR (CKD-EPI)NonAf 


 


Random Glucose 


 


Lactic Acid  0.9


 


Calcium 


 


Phosphorus 


 


Magnesium 


 


Total Bilirubin 


 


AST 


 


ALT 


 


Alkaline Phosphatase 


 


Creatine Kinase 


 


Troponin I 


 


Total Protein 


 


Albumin 


 


Lipase 


 


Stool Occult Blood 


 


Blood Type 


 


Antibody Screen 


 


Crossmatch 








Active Medications











Generic Name Dose Route Start Last Admin





  Trade Name Freq  PRN Reason Stop Dose Admin


 


Chlorhexidine Gluconate  1 applic  06/08/19 22:00  06/08/19 22:59





  Hibiclens For Decolonization -  TP   1 applic





  HS SILVANA   Administration





     





     





     





     


 


Pantoprazole Sodium 80 mg/  100 mls @ 10 mls/hr  06/08/19 09:30  06/09/19 07:14





  Sodium Chloride  IVPB   Not Given





  Q10H SILVANA   





     





     





     





  8 MG/HR   


 


Lactated Ringer's  1,000 ml in 1,000 mls @ 125 mls/hr  06/08/19 11:00  06/09/19 

07:13





  Lactated Ringers Solution  IV   125 mls/hr





  ASDIR SILVANA   Administration





     





     





     





     


 


Mupirocin  1 applic  06/08/19 22:00  06/08/19 23:01





  Bactroban Ointment (For Decolonization) -  NS  06/13/19 21:59  1 applic





  BID SILVANA   Administration





     





     





     





     


 


Trazodone HCl  300 mg  06/08/19 22:00  06/08/19 22:54





  Desyrel -  PO   300 mg





  HS SILVANA   Administration





     





     





     





     











ASSESSMENT/PLAN:


62 y/o male with PMH of HTN, anxiety, MDD, alcohol abuse presents to the ED 

with complaints of dark stools since Wednesday





#Neuro


-no neurological issues at this juncture


-patient is AO X3





#Cardio


patient has history of hypertension


-currently holding antihypertensives in light of GI bleed


-will monitor hemodynamics 





#GI


GI Bleed- rectal exam this AM shows blood mixed in with stool


patient is 2/p 4 units of PRBCS : repeat Hgb this AM 7.2


-underwent endoscopy yesterday showing erythematous gastropathy but no overt 

bleeding (no ulcers or varices seen)


-patient also found to have acute on chronic pancreatitis (given CT findings 

and elevated lipase)


-Dr. Álvarez on board


-NPO


-protonix drip


-will need colonoscopy


-CBC pending for 12pm; if Hgb drops will transfuse


-LR @125mls/hr 





#Heme


patients most recent Hgb this AM was 7.2


-repeat CBC pending for 12pm this afternoon


-if Hgb <7 ian transfuse





# Psych


patient not currently withdrawing from alcohol


-will continue to monitor CIWA


-c/w patients home trazodone





F/E/N


LR @125mls/hr


monitor electrolytes


NPO





DVT PPX: SCDs in light of GI bleed


GI PPX: protonix drip 





dispo: if repeat CBC this afternoon remains stable can be transferred to floor





Problem List





- Problems


(1) HTN (hypertension)


Code(s): I10 - ESSENTIAL (PRIMARY) HYPERTENSION   





(2) Alcohol abuse


Code(s): F10.10 - ALCOHOL ABUSE, UNCOMPLICATED   





(3) Depression


Code(s): F32.9 - MAJOR DEPRESSIVE DISORDER, SINGLE EPISODE, UNSPECIFIED   





(4) Anemia


Code(s): D64.9 - ANEMIA, UNSPECIFIED   


Qualifiers: 


   Anemia type: unspecified type   Qualified Code(s): D64.9 - Anemia, 

unspecified   





(5) GI bleed


Code(s): K92.2 - GASTROINTESTINAL HEMORRHAGE, UNSPECIFIED   


Qualifiers: 


   GI bleed type/associated pathology: unspecified gastrointestinal hemorrhage 

type   Qualified Code(s): K92.2 - Gastrointestinal hemorrhage, unspecified   





Visit type





- Emergency Visit


Emergency Visit: Yes


ED Registration Date: 06/08/19


Care time: The patient presented to the Emergency Department on the above date 

and was hospitalized for further evaluation of their emergent condition.





- New Patient


This patient is new to me today: Yes


Date on this admission: 06/09/19





- Critical Care


Critical Care patient: Yes


Total Critical Care Time (in minutes): 35


Critical Care Statement: The care of this patient involved high complexity 

decision making to prevent further life threatening deterioration of the patient

's condition and/or to evaluate & treat vital organ system(s) failure or risk 

of failure.

## 2019-06-09 NOTE — PN
Physical Exam: 


SUBJECTIVE: Patient seen and examined








OBJECTIVE:





 Vital Signs











 Period  Temp  Pulse  Resp  BP Sys/Goldstein  Pulse Ox


 


 Last 24 Hr  98.1 F-98.9 F    16-24  112-143/61-84  











GENERAL: Awake,pos pallor, looks better


HEAD: Normal with no signs of trauma.


EYES: Pupils equal, round and reactive to light, extraocular movements intact, 

sclera anicteric, conjunctiva clear. No lid lag.


EARS, NOSE, THROAT: Ears normal, nares patent, oropharynx clear without 

exudates. dry mucous membrane


NECK: Normal range of motion, supple, no JVD


LUNGS: Breath sounds equal, clear to auscultation bilaterally. No wheezes, and 

no crackles. No accessory muscle use.


HEART: S1S2 regular tachycardic


ABDOMEN: Soft, mild epigastric tenderness, no voluntary or involuntary guarding 

or rigidity, pos bowel sounds


RECTAL: dark maroon blob with some mucous in the ck-rectal area


MUSCULOSKELETAL: Normal range of motion at all joints. No bony deformities or 

tenderness. No CVA tenderness.


UPPER EXTREMITIES: 2+ pulses, warm, well-perfused. No cyanosis. No clubbing. No 

peripheral edema.


LOWER EXTREMITIES: 2+ pulses, warm, well-perfused. No calf tenderness. No 

peripheral edema. 


NEUROLOGICAL:  AAOx3, Cranial nerves II-XII grossly intact. Normal speech. Gait 

not observed


PSYCHIATRIC: Cooperative. Good eye contact. Appropriate mood and affect.


SKIN: Warm, dry, decreased turgor, no rashes or lesions noted, normal capillary 

refill. 














 Laboratory Results - last 24 hr











  06/08/19 06/08/19 06/08/19





  07:49 07:49 07:49


 


WBC   


 


RBC   


 


Hgb   


 


Hct   


 


MCV   


 


MCH   


 


MCHC   


 


RDW   


 


Plt Count  371  


 


MPV   


 


Absolute Neuts (auto)   


 


Neutrophils %   


 


Lymphocytes %   


 


Monocytes %   


 


Eosinophils %   


 


Basophils %   


 


Nucleated RBC %   


 


Platelet Estimate   


 


Platelet Comment   


 


Sodium   138 


 


Potassium   3.8 


 


Chloride   107 


 


Carbon Dioxide   22 


 


Anion Gap   9 


 


BUN   22.7 H 


 


Creatinine   1.3 


 


Est GFR (CKD-EPI)AfAm   67.78 


 


Est GFR (CKD-EPI)NonAf   58.48 


 


Random Glucose   169 H 


 


Hemoglobin A1c %   


 


Lactic Acid   


 


Calcium   7.4 L 


 


Phosphorus   


 


Magnesium   


 


Total Bilirubin   0.3 


 


AST   20 


 


ALT   13 


 


Alkaline Phosphatase   38 L 


 


Creatine Kinase   101 


 


Troponin I   < 0.02 


 


Total Protein   4.6 L 


 


Albumin   2.2 L 


 


Lipase   4132 H 


 


Blood Type    A POSITIVE


 


Antibody Screen    Negative


 


Crossmatch    See Detail














  06/08/19 06/09/19 06/09/19





  15:50 05:35 05:35


 


WBC  12.4 H  9.4 


 


RBC  1.95 L  2.41 L 


 


Hgb  5.9 L*  7.2 L 


 


Hct  17.6 L D  21.0 L D 


 


MCV  90.5  87.1 


 


MCH  30.0  30.1 


 


MCHC  33.2  34.6 


 


RDW  17.4 H  17.3 H 


 


Plt Count  186  D  


 


MPV  8.2  8.5 


 


Absolute Neuts (auto)  9.1 H  


 


Neutrophils %  73.2  


 


Lymphocytes %  17.9  


 


Monocytes %  7.8  


 


Eosinophils %  0.7  


 


Basophils %  0.4  


 


Nucleated RBC %  0  


 


Platelet Estimate  Adequate  


 


Platelet Comment  Slide scanned  


 


Sodium    139


 


Potassium    4.2


 


Chloride    110 H


 


Carbon Dioxide    26


 


Anion Gap    3 L


 


BUN    17.7


 


Creatinine    1.1


 


Est GFR (CKD-EPI)AfAm    82.95


 


Est GFR (CKD-EPI)NonAf    71.57


 


Random Glucose    115 H


 


Hemoglobin A1c %   


 


Lactic Acid   


 


Calcium    7.2 L


 


Phosphorus    2.9


 


Magnesium    1.7 L


 


Total Bilirubin    1.9 H


 


AST    25


 


ALT    13


 


Alkaline Phosphatase    36 L


 


Creatine Kinase   


 


Troponin I   


 


Total Protein    4.3 L


 


Albumin    2.1 L


 


Lipase   


 


Blood Type   


 


Antibody Screen   


 


Crossmatch   














  06/09/19 06/09/19





  05:35 05:35


 


WBC  


 


RBC  


 


Hgb  


 


Hct  


 


MCV  


 


MCH  


 


MCHC  


 


RDW  


 


Plt Count  


 


MPV  


 


Absolute Neuts (auto)  


 


Neutrophils %  


 


Lymphocytes %  


 


Monocytes %  


 


Eosinophils %  


 


Basophils %  


 


Nucleated RBC %  


 


Platelet Estimate  


 


Platelet Comment  


 


Sodium  


 


Potassium  


 


Chloride  


 


Carbon Dioxide  


 


Anion Gap  


 


BUN  


 


Creatinine  


 


Est GFR (CKD-EPI)AfAm  


 


Est GFR (CKD-EPI)NonAf  


 


Random Glucose  


 


Hemoglobin A1c %  < 5.0 


 


Lactic Acid   0.9


 


Calcium  


 


Phosphorus  


 


Magnesium  


 


Total Bilirubin  


 


AST  


 


ALT  


 


Alkaline Phosphatase  


 


Creatine Kinase  


 


Troponin I  


 


Total Protein  


 


Albumin  


 


Lipase  


 


Blood Type  


 


Antibody Screen  


 


Crossmatch  








Active Medications











Generic Name Dose Route Start Last Admin





  Trade Name Freq  PRN Reason Stop Dose Admin


 


Chlorhexidine Gluconate  1 applic  06/08/19 22:00  06/08/19 22:59





  Hibiclens For Decolonization -  TP   1 applic





  HS SILVANA   Administration





     





     





     





     


 


Pantoprazole Sodium 80 mg/  100 mls @ 10 mls/hr  06/08/19 09:30  06/09/19 07:14





  Sodium Chloride  IVPB   Not Given





  Q10H SILVANA   





     





     





     





  8 MG/HR   


 


Lactated Ringer's  1,000 ml in 1,000 mls @ 125 mls/hr  06/08/19 11:00  06/09/19 

07:13





  Lactated Ringers Solution  IV   125 mls/hr





  ASDIR SILVANA   Administration





     





     





     





     


 


Mupirocin  1 applic  06/08/19 22:00  06/08/19 23:01





  Bactroban Ointment (For Decolonization) -  NS  06/13/19 21:59  1 applic





  BID SILVANA   Administration





     





     





     





     


 


Trazodone HCl  300 mg  06/08/19 22:00  06/08/19 22:54





  Desyrel -  PO   300 mg





  HS SILVANA   Administration





     





     





     





     











ASSESSMENT/PLAN:


63 yom with PMHx of HTN, ETOH abuse, Anxiety, major depression admitted with 

acute gastrointestinal haemorrhage with hemodynamic instability.





-Acute massive gastrointestinal haemorrhage,s/p EGD, r/o small bowel/colon 

etiology, ?AVM, vs ischemic/inflammatory vs diverticular


-Acute severe blood loss anemia


-Acute pancreatitis, suspect alcohol mediated, less likely GB etiology


-h/o HTN


-Anxiety


-Major depression


-h/o Suicidal ideation requiring inpatient psychiatric hospitalization





Plan:


s/p EGD


s/p 4 units PRBC, h/h noted.


Still with ongoing dark BM and rectal exam with concerns for bleed/melena.


Hemodynamics stable. 


Closely monitor H/h. 


If drifting down, may need bleeding scan to address if is a candidate for IR 

guided embolization. 


If stable, will tranfuse to keep Hb >8 and follow up with GI for possible 

colonoscopy and additional testing. 


Protonix drip. Can d/c octreotide drip given EGD, discuss with GI. 


CT A/P noted. Supportive treatment. RUQ US when stable. Suspect alcohol 

etiology for pancreatitis. 


Currently on nifedipine/metoprolol/buspiron/buproprion at home, unsure of 

doses. Hold for now


DVTPPX SCDs


Dispo Continue monitoring in ICU. 


Plan discussed with patient in detail, all questions answered. 


Care co-ordinated with ICU


Total critical care time spent 38 min. 








Visit type





- Emergency Visit


Emergency Visit: Yes


ED Registration Date: 06/08/19


Care time: The patient presented to the Emergency Department on the above date 

and was hospitalized for further evaluation of their emergent condition.





- New Patient


This patient is new to me today: No





- Critical Care


Critical Care patient: Yes


Total Critical Care Time (in minutes): 38


Critical Care Statement: The care of this patient involved high complexity 

decision making to prevent further life threatening deterioration of the patient

's condition and/or to evaluate & treat vital organ system(s) failure or risk 

of failure.

## 2019-06-09 NOTE — PN.GI
GI Progress Note


Subjective: 





PATIENT STATES HE IS FEELING BETTER / LESS EPIGASTRIC DISCOMFORT; HAD ONE BOWEL 

MOVEMENT - DARK IN COLOR WITH BRIGHT RED BLOOD. DENIES N/V; STATES HE IS HUNGRY 








- Objective


Vital Signs: 


 Vital Signs











Temperature  98.6 F   06/09/19 02:00


 


Pulse Rate  92 H  06/09/19 07:00


 


Respiratory Rate  18   06/09/19 07:00


 


Blood Pressure  124/75   06/09/19 07:00


 


O2 Sat by Pulse Oximetry (%)  100   06/08/19 21:47











Constitutional: Well Nourished, No Distress, Calm


Eyes: Yes: WNL, Conjunctiva Clear


HENT: Yes: WNL


Neck: Yes: WNL


Cardiovascular: Yes: WNL, Regular Rate and Rhythm


Respiratory: Yes: WNL, Regular, CTA Bilaterally


Gastrointestinal Inspection: Yes: WNL


...Auscultate: Yes: Normoactive Bowel Sounds


Extremities: Yes: WNL


Edema: No


Labs: 


 CBC, BMP





 06/09/19 05:35 





 INR, PTT











INR  1.23  (0.83-1.09)  H  06/08/19  07:49    














Problem List





- Problems


(1) Pancreatitis


Assessment/Plan: 


S/P EGD WITH FINDINGS OF MILD GASTROPATHY - NO ULCER OR BLOOD VISUALIZED. 


SUSPECT SECONDARY TO DIVERTICULOSIS VS. ANGIOECTASIA / SMALL BOWEL ETIOLOGY. 





ACUTE PANCREATITIS DDX - ETOH INDUCED VS BILIARY ETIOLOGY  





REC:


- C/W IVF'S 


- ADVANCE TO CLEAR LIQUID DIET 


- STOP PPI INFUSION CAN CHANGE TO PROTONIX 40 MG PO QD 


- BLEEDING SCAN OR CTA IF DEVELOPS OVERT BLEED. 


- SURGERY EVALUATION 


- TREND CBC/ CHEMISTRY / LFT 


- MRCP ABD ONCE STABLE 


- WILL NEED A COLONOSCOPY ONCE HIS PANCREATITIS RESOLVES. 


- MICU CARE 


Code(s): K85.90 - ACUTE PANCREATITIS WITHOUT NECROSIS OR INFECTION, UNSP   





(2) Alcohol abuse


Code(s): F10.10 - ALCOHOL ABUSE, UNCOMPLICATED   





(3) Anemia


Code(s): D64.9 - ANEMIA, UNSPECIFIED   


Qualifiers: 


   Anemia type: unspecified type   Qualified Code(s): D64.9 - Anemia, 

unspecified   





(4) GI bleed


Code(s): K92.2 - GASTROINTESTINAL HEMORRHAGE, UNSPECIFIED   


Qualifiers: 


   GI bleed type/associated pathology: unspecified gastrointestinal hemorrhage 

type   Qualified Code(s): K92.2 - Gastrointestinal hemorrhage, unspecified

## 2019-06-09 NOTE — PN
Teaching Attending Note


Name of Resident: Esther Oneal





ATTENDING PHYSICIAN STATEMENT





I saw and evaluated the patient.


I reviewed the resident's note and discussed the case with the resident.


I agree with the resident's findings and plan as documented.








SUBJECTIVE:





Pt seen and examined in the ICU. s/p EGD yesterday showing portal gastropathy 

but no source of bleed identified. Transfused 4 units PRBC. Still some bloody 

bowel movements. No shortness of breath, chest pain. Abdominal discomfort 

improving.





OBJECTIVE:





 Vital Signs











 Period  Temp  Pulse  Resp  BP Sys/Goldstein  Pulse Ox


 


 Last 24 Hr  98.1 F-98.9 F    16-24  112-143/61-84  








 Intake & Output











 06/06/19 06/07/19 06/08/19 06/09/19





 23:59 23:59 23:59 23:59


 


Intake Total   2950 960


 


Output Total   250 400


 


Balance   2700 560


 


Weight   92.533 kg 88.592 kg








Gen:  NAD at rest


Heart: RRR


Lung: decreased breath sounds at the bases


Abd: soft, nontender


Ext: no edema





 CBC, BMP





 06/09/19 05:35 





 06/09/19 05:35 





Active Medications





Chlorhexidine Gluconate (Hibiclens For Decolonization -)  1 applic TP HS [facility]


   Last Admin: 06/08/19 22:59 Dose:  1 applic


Pantoprazole Sodium 80 mg/ (Sodium Chloride)  100 mls @ 10 mls/hr IVPB Q10H [facility]


   Last Admin: 06/09/19 07:14 Dose:  Not Given


Lactated Ringer's (Lactated Ringers Solution)  1,000 ml in 1,000 mls @ 125 mls/

hr IV ASDIR [facility]


   Last Admin: 06/09/19 07:13 Dose:  125 mls/hr


Mupirocin (Bactroban Ointment (For Decolonization) -)  1 applic NS BID [facility]


   Stop: 06/13/19 21:59


   Last Admin: 06/08/19 23:01 Dose:  1 applic


Trazodone HCl (Desyrel -)  300 mg PO HS [facility]


   Last Admin: 06/08/19 22:54 Dose:  300 mg








ASSESSMENT AND PLAN:


GI Bleed


Acute Blood Loss Anemia


Alcohol Abuse


Acute Pancreatitis


Depression


HTN





-  monitor H/H


-  transfuse as needed


-  protonix gtt


-  octreotide gtt d/c'd


-  IVF


-  NPO


-  ensure large bore peripheral access


-  will likely need colonoscopy


-  monitor for withdrawal symptoms


-  DVT prophylaxis


-  can monitor on floor if H/H stable

## 2019-06-10 LAB
ALBUMIN SERPL-MCNC: 2 G/DL (ref 3.4–5)
ALP SERPL-CCNC: 36 U/L (ref 45–117)
ALT SERPL-CCNC: 11 U/L (ref 13–61)
ANION GAP SERPL CALC-SCNC: 3 MMOL/L (ref 8–16)
AST SERPL-CCNC: 17 U/L (ref 15–37)
BASOPHILS # BLD: 0.3 % (ref 0–2)
BILIRUB CONJ SERPL-MCNC: 0.2 MG/DL (ref 0–0.2)
BILIRUB CONJ SERPL-MCNC: 0.2 MG/DL (ref 0–0.2)
BILIRUB DIRECT SERPL-MCNC: 167 U/L (ref 87–246)
BILIRUB SERPL-MCNC: 0.6 MG/DL (ref 0.2–1)
BUN SERPL-MCNC: 9.5 MG/DL (ref 7–18)
CALCIUM SERPL-MCNC: 7.2 MG/DL (ref 8.5–10.1)
CHLORIDE SERPL-SCNC: 108 MMOL/L (ref 98–107)
CHOLEST SERPL-MCNC: 92 MG/DL (ref 50–200)
CO2 SERPL-SCNC: 30 MMOL/L (ref 21–32)
CREAT SERPL-MCNC: 0.9 MG/DL (ref 0.55–1.3)
DEPRECATED RDW RBC AUTO: 16.6 % (ref 11.9–15.9)
DEPRECATED RDW RBC AUTO: 16.8 % (ref 11.9–15.9)
EOSINOPHIL # BLD: 4.2 % (ref 0–4.5)
GLUCOSE SERPL-MCNC: 103 MG/DL (ref 74–106)
HCT VFR BLD CALC: 21.5 % (ref 35.4–49)
HCT VFR BLD CALC: 26.8 % (ref 35.4–49)
HDLC SERPL-MCNC: 32 MG/DL (ref 40–60)
HGB BLD-MCNC: 7.6 GM/DL (ref 11.7–16.9)
HGB BLD-MCNC: 9.1 GM/DL (ref 11.7–16.9)
LYMPHOCYTES # BLD: 20.5 % (ref 8–40)
MAGNESIUM SERPL-MCNC: 1.5 MG/DL (ref 1.8–2.4)
MCH RBC QN AUTO: 30 PG (ref 25.7–33.7)
MCH RBC QN AUTO: 30.6 PG (ref 25.7–33.7)
MCHC RBC AUTO-ENTMCNC: 34.1 G/DL (ref 32–35.9)
MCHC RBC AUTO-ENTMCNC: 35.4 G/DL (ref 32–35.9)
MCV RBC: 86.5 FL (ref 80–96)
MCV RBC: 88 FL (ref 80–96)
MONOCYTES # BLD AUTO: 10 % (ref 3.8–10.2)
NEUTROPHILS # BLD: 65 % (ref 42.8–82.8)
PHOSPHATE SERPL-MCNC: 3.2 MG/DL (ref 2.5–4.9)
PLATELET # BLD AUTO: 159 K/MM3 (ref 134–434)
PLATELET # BLD AUTO: 176 K/MM3 (ref 134–434)
PMV BLD: 7.5 FL (ref 7.5–11.1)
PMV BLD: 8.1 FL (ref 7.5–11.1)
POTASSIUM SERPLBLD-SCNC: 3.9 MMOL/L (ref 3.5–5.1)
PROT SERPL-MCNC: 4.3 G/DL (ref 6.4–8.2)
RBC # BLD AUTO: 2.49 M/MM3 (ref 4–5.6)
RBC # BLD AUTO: 3.04 M/MM3 (ref 4–5.6)
SODIUM SERPL-SCNC: 141 MMOL/L (ref 136–145)
TRIGL SERPL-MCNC: 87 MG/DL (ref 0–150)
WBC # BLD AUTO: 7.1 K/MM3 (ref 4–10)
WBC # BLD AUTO: 7.7 K/MM3 (ref 4–10)

## 2019-06-10 RX ADMIN — SODIUM CHLORIDE, POTASSIUM CHLORIDE, SODIUM LACTATE AND CALCIUM CHLORIDE SCH MLS/HR: 600; 310; 30; 20 INJECTION, SOLUTION INTRAVENOUS at 07:55

## 2019-06-10 RX ADMIN — SODIUM CHLORIDE, POTASSIUM CHLORIDE, SODIUM LACTATE AND CALCIUM CHLORIDE SCH MLS/HR: 600; 310; 30; 20 INJECTION, SOLUTION INTRAVENOUS at 23:05

## 2019-06-10 RX ADMIN — TRAZODONE HYDROCHLORIDE SCH MG: 100 TABLET ORAL at 23:06

## 2019-06-10 RX ADMIN — MUPIROCIN SCH APPLIC: 20 OINTMENT TOPICAL at 09:02

## 2019-06-10 RX ADMIN — SODIUM CHLORIDE, POTASSIUM CHLORIDE, SODIUM LACTATE AND CALCIUM CHLORIDE SCH: 600; 310; 30; 20 INJECTION, SOLUTION INTRAVENOUS at 11:00

## 2019-06-10 NOTE — PN
Progress Note (short form)





- Note


Progress Note: 





Patient seen and examined


Two dark loose bm overnight and one during day today


Denies abdominal pain, just feels gassy


Just completed 1 unit PRBC


Patient is moving to floor





 Vital Signs











Temp  98.2 F   06/10/19 14:00


 


Pulse  86   06/10/19 14:00


 


Resp  16   06/10/19 14:00


 


BP  141/82   06/10/19 14:00


 


Pulse Ox  100   06/10/19 08:00








 


NAD


soft, distended but nontender, +tympany





 CBC, BMP





 06/10/19 05:20 





 06/10/19 05:20 








Pancreatitis - improving - continue clear liquids


GI bleed - EGD was negative. Follow up PM CBC, monitor bms, will likely plan 

for colonoscopy later this week

## 2019-06-10 NOTE — PN
Physical Exam: 


SUBJECTIVE: Patient seen and examined at bedside- no acute events overnight' 

patient did have a bowel movement that was "burgundy" in color; states that he 

felt dizzy when he got up from the toilet but no chest pains or diaphoresis; is 

having slight abdominal tenderness; denies CP/SOB/N/V








OBJECTIVE:





 Vital Signs











 Period  Temp  Pulse  Resp  BP Sys/Goldstein  Pulse Ox


 


 Last 24 Hr  98 F-98.8 F  81-98  14-21  108-144/57-85  100-100











GENERAL: The patient is awake, alert, and fully oriented, in no acute distress.


EYES: PEERLA: EOMI; no scleral icterus . 


NECK: no JVD: no lymphadenopathy 


LUNGS: CTA B/L; no rales, rhonchi or wheezing 


HEART: Regular rate and rhythm, S1, S2 without murmur, rub or gallop.


ABDOMEN: Soft, slight epigastric tenderness, nondistended, normoactive bowel 

sounds, no guarding, no 


rebound, no hepatosplenomegaly, no masses.


EXTREMITIES: 2+ pulses, warm, well-perfused, no edema. 


PSYCH: Normal mood, normal affect.


SKIN: Warm, dry, normal turgor, no rashes or lesions noted














 Laboratory Results - last 24 hr











  06/08/19 06/09/19 06/09/19





  07:49 05:35 05:35


 


WBC   9.4 


 


RBC   2.41 L 


 


Hgb   7.2 L 


 


Hct   21.0 L D 


 


MCV   87.1 


 


MCH   30.1 


 


MCHC   34.6 


 


RDW   17.3 H 


 


Plt Count   174.6 


 


MPV   8.5 


 


Manual Slide Review   


 


Platelet Comment   


 


Sodium   


 


Potassium   


 


Chloride   


 


Carbon Dioxide   


 


Anion Gap   


 


BUN   


 


Creatinine   


 


Est GFR (CKD-EPI)AfAm   


 


Est GFR (CKD-EPI)NonAf   


 


POC Glucometer   


 


Random Glucose   


 


Hemoglobin A1c %    < 5.0


 


Calcium   


 


Phosphorus   


 


Magnesium   


 


Direct Bilirubin   


 


LD Total   


 


Triglycerides   


 


Cholesterol   


 


Total LDL Cholesterol   


 


HDL Cholesterol   


 


Blood Type  A POSITIVE  


 


Antibody Screen  Negative  


 


Crossmatch  See Detail  














  06/09/19 06/09/19 06/09/19





  12:20 12:29 19:40


 


WBC  9.4   9.0


 


RBC  2.36 L   2.59 L


 


Hgb  7.0 L   7.8 L


 


Hct  20.5 L   22.7 L


 


MCV  86.9   87.7


 


MCH  29.8   30.0


 


MCHC  34.3   34.2


 


RDW  17.7 H   17.0 H


 


Plt Count  240  D   148  D


 


MPV  8.0   8.2


 


Manual Slide Review    Reviewed


 


Platelet Comment    Adq


 


Sodium   


 


Potassium   


 


Chloride   


 


Carbon Dioxide   


 


Anion Gap   


 


BUN   


 


Creatinine   


 


Est GFR (CKD-EPI)AfAm   


 


Est GFR (CKD-EPI)NonAf   


 


POC Glucometer   133 


 


Random Glucose   


 


Hemoglobin A1c %   


 


Calcium   


 


Phosphorus   


 


Magnesium   


 


Direct Bilirubin   


 


LD Total   


 


Triglycerides   


 


Cholesterol   


 


Total LDL Cholesterol   


 


HDL Cholesterol   


 


Blood Type   


 


Antibody Screen   


 


Crossmatch   














  06/10/19 06/10/19 06/10/19





  05:20 05:20 05:20


 


WBC  7.1  


 


RBC  2.49 L  


 


Hgb  7.6 L  


 


Hct  21.5 L  


 


MCV  86.5  


 


MCH  30.6  


 


MCHC  35.4  


 


RDW  16.8 H  


 


Plt Count   


 


MPV  7.5  


 


Manual Slide Review   


 


Platelet Comment   


 


Sodium   141 


 


Potassium   3.9 


 


Chloride   108 H 


 


Carbon Dioxide   30 


 


Anion Gap   3 L 


 


BUN   9.5 


 


Creatinine   0.9 


 


Est GFR (CKD-EPI)AfAm   105.72 


 


Est GFR (CKD-EPI)NonAf   91.22 


 


POC Glucometer   


 


Random Glucose   103 


 


Hemoglobin A1c %   


 


Calcium   7.2 L 


 


Phosphorus   3.2 


 


Magnesium   1.5 L 


 


Direct Bilirubin   0.2 


 


LD Total   167 


 


Triglycerides    87


 


Cholesterol    92


 


Total LDL Cholesterol    47


 


HDL Cholesterol    32 L


 


Blood Type   


 


Antibody Screen   


 


Crossmatch   








Active Medications











Generic Name Dose Route Start Last Admin





  Trade Name Freq  PRN Reason Stop Dose Admin


 


Chlorhexidine Gluconate  1 applic  06/08/19 22:00  06/09/19 21:51





  Hibiclens For Decolonization -  TP   1 applic





  HS [facility]   Administration





     





     





     





     


 


Lactated Ringer's  1,000 ml in 1,000 mls @ 125 mls/hr  06/08/19 11:00  06/09/19 

15:58





  Lactated Ringers Solution  IV   125 mls/hr





  ASDIR [facility]   Administration





     





     





     





     


 


Magnesium Oxide  800 mg  06/10/19 07:44  





  Mag-Ox -  PO  06/10/19 07:45  





  ONCE ONE   





     





     





     





     


 


Mupirocin  1 applic  06/08/19 22:00  06/09/19 21:51





  Bactroban Ointment (For Decolonization) -  NS  06/13/19 21:59  1 applic





  BID [facility]   Administration





     





     





     





     


 


Pantoprazole Sodium  40 mg  06/10/19 10:00  





  Protonix Iv  IVPUSH   





  DAILY [facility]   





     





     





     





     


 


Trazodone HCl  300 mg  06/08/19 22:00  06/09/19 21:51





  Desyrel -  PO   300 mg





  HS [facility]   Administration





     





     





     





     











ASSESSMENT/PLAN:


64 y/o male with PMH of HTN, anxiety, MDD, alcohol abuse presents to the ED 

with complaints of dark stools since Wednesday





#Neuro


-no neurological issues at this juncture


-patient is AO X3





#Cardio


patient has history of hypertension


-currently holding antihypertensives in light of GI bleed


-will monitor hemodynamics 





#GI


GI Bleed- rectal exam this AM shows blood mixed in with stool


patient is 2/p 5 units of PRBCS : repeat Hgb this AM 7.6; will be receiving 1 

more unit of blood


 endoscopy showing erythematous gastropathy but no overt bleeding (no ulcers or 

varices seen)


-patient also found to have acute on chronic pancreatitis (given CT findings 

and elevated lipase); 


-RUQ U/S pending


-Dr. Álvarez on board


-clear liquid diet


-protonix IV daily


-will need colonoscopy once pancreatitis has resolved as per Dr. Álvarez





-LR @125mls/hr 





#Heme


patients most recent Hgb this AM was 7.6; not appropriately rising given the 

amount of units hes been transfused 


-will be receivign 1 more unit of PRBCS


-post transfusion CBC 


-hemolysis w/u in place


-if Hgb <7 ian transfuse





# Psych


patient not currently withdrawing from alcohol


-will continue to monitor CIWA


-c/w patients home trazodone





F/E/N


LR @125mls/hr


monitor electrolytes


clear diet





DVT PPX: SCDs in light of GI bleed


GI PPX: protonix daily





dispo: transfer to telemetry








Problem List





- Problems


(1) HTN (hypertension)


Code(s): I10 - ESSENTIAL (PRIMARY) HYPERTENSION   





(2) Alcohol abuse


Code(s): F10.10 - ALCOHOL ABUSE, UNCOMPLICATED   





(3) Depression


Code(s): F32.9 - MAJOR DEPRESSIVE DISORDER, SINGLE EPISODE, UNSPECIFIED   





(4) Anemia


Code(s): D64.9 - ANEMIA, UNSPECIFIED   


Qualifiers: 


   Anemia type: unspecified type   Qualified Code(s): D64.9 - Anemia, 

unspecified   





(5) GI bleed


Code(s): K92.2 - GASTROINTESTINAL HEMORRHAGE, UNSPECIFIED   


Qualifiers: 


   GI bleed type/associated pathology: unspecified gastrointestinal hemorrhage 

type   Qualified Code(s): K92.2 - Gastrointestinal hemorrhage, unspecified   





Visit type





- Emergency Visit


Emergency Visit: Yes


ED Registration Date: 06/08/19


Care time: The patient presented to the Emergency Department on the above date 

and was hospitalized for further evaluation of their emergent condition.





- New Patient


This patient is new to me today: No





- Critical Care


Critical Care patient: Yes


Total Critical Care Time (in minutes): 35


Critical Care Statement: The care of this patient involved high complexity 

decision making to prevent further life threatening deterioration of the patient

's condition and/or to evaluate & treat vital organ system(s) failure or risk 

of failure. Surgeon/Pathologist Verbiage (Will Incorporate Name Of Surgeon From Intro If Not Blank): operated in two distinct and integrated capacities as the surgeon and pathologist.

## 2019-06-10 NOTE — PN
Teaching Attending Note


Name of Resident: Esther Oneal





ATTENDING PHYSICIAN STATEMENT





I saw and evaluated the patient.


I reviewed the resident's note and discussed the case with the resident.


I agree with the resident's findings and plan as documented.








SUBJECTIVE:





Pt seen and examined in the ICU. Still with some bloody bowel movements. No 

shortness of breath or chest pain. No further abdominal pain.





OBJECTIVE:





 Vital Signs











 Period  Temp  Pulse  Resp  BP Sys/Goldstein  Pulse Ox


 


 Last 24 Hr  98 F-98.8 F  79-98  14-21  108-144/57-85  100-100








 Intake & Output











 06/07/19 06/08/19 06/09/19 06/10/19





 23:59 23:59 23:59 23:59


 


Intake Total  2900 3090 1000


 


Output Total  250 2550 2250


 


Balance  2650 540 -1250


 


Weight  92.533 kg 88.592 kg 88.451 kg








Gen:  NAD at rest


Heart: RRR


Lung: decreased breath sounds at the bases


Abd: soft, nontender


Ext: no edema





 CBC, BMP





 06/10/19 05:20 





 06/10/19 05:20 





Active Medications





Chlorhexidine Gluconate (Hibiclens For Decolonization -)  1 applic TP HS [facility]


   Last Admin: 06/09/19 21:51 Dose:  1 applic


Lactated Ringer's (Lactated Ringers Solution)  1,000 ml in 1,000 mls @ 125 mls/

hr IV ASDIR [facility]


   Last Admin: 06/10/19 07:55 Dose:  125 mls/hr


Mupirocin (Bactroban Ointment (For Decolonization) -)  1 applic NS BID [facility]


   Stop: 06/13/19 21:59


   Last Admin: 06/10/19 09:02 Dose:  1 applic


Pantoprazole Sodium (Protonix Iv)  40 mg IVPUSH DAILY [facility]


   Last Admin: 06/10/19 09:03 Dose:  40 mg


Trazodone HCl (Desyrel -)  300 mg PO HS [facility]


   Last Admin: 06/09/19 21:51 Dose:  300 mg








ASSESSMENT AND PLAN:


GI Bleed


Acute Blood Loss Anemia


Alcohol Abuse


Acute Pancreatitis


Depression


HTN





-  monitor H/H


-  transfuse as needed


-  protonix


-  IVF


-  PO per GI


-  ensure large bore peripheral access


-  will likely need colonoscopy


-  monitor for withdrawal symptoms


-  DVT prophylaxis


-  can monitor on floor

## 2019-06-10 NOTE — PN
Physical Exam: 


SUBJECTIVE: Patient seen and examined





resting in bed nad. afebrile hemodynamically stable. no acute events. no bm 

today. denies abd pain, b/b/d/c. no further bleeding episodes. tolerating 

clears 


OBJECTIVE:





 Vital Signs











 Period  Temp  Pulse  Resp  BP Sys/Goldstein  Pulse Ox


 


 Last 24 Hr  98 F-98.8 F  79-98  14-21  108-144/57-85  100-100











GENERAL: The patient is awake, alert, and fully oriented, in no acute distress.


HEAD: Normal with no signs of trauma.


EYES: PERRL, extraocular movements intact, sclera anicteric, conjunctiva clear. 

No ptosis. 


ENT: moist mucous membranes.


NECK: supple. 


LUNGS: Breath sounds equal, clear to auscultation bilaterally


HEART: Regular rate and rhythm, S1, S2 


ABDOMEN: Soft, nontender, nondistended, normoactive bowel sounds, no guarding, 

no 


rebound, no hepatosplenomegaly, no masses.


EXTREMITIES: no edema. 


NEUROLOGICAL: Cranial nerves II through XII grossly intact. Normal speech, gait 

not 


observed.


PSYCH: Normal mood, normal affect.


SKIN: Warm, dry














 Laboratory Results - last 24 hr











  06/08/19 06/09/19 06/09/19





  07:49 05:35 12:20


 


WBC    9.4


 


RBC    2.36 L


 


Hgb    7.0 L


 


Hct    20.5 L


 


MCV    86.9


 


MCH    29.8


 


MCHC    34.3


 


RDW    17.7 H


 


Plt Count   174.6  240  D


 


MPV    8.0


 


Manual Slide Review   


 


Platelet Comment   


 


Sodium   


 


Potassium   


 


Chloride   


 


Carbon Dioxide   


 


Anion Gap   


 


BUN   


 


Creatinine   


 


Est GFR (CKD-EPI)AfAm   


 


Est GFR (CKD-EPI)NonAf   


 


POC Glucometer   


 


Random Glucose   


 


Calcium   


 


Phosphorus   


 


Magnesium   


 


Direct Bilirubin   


 


LD Total   


 


Triglycerides   


 


Cholesterol   


 


Total LDL Cholesterol   


 


HDL Cholesterol   


 


Blood Type  A POSITIVE  


 


Antibody Screen  Negative  


 


Crossmatch  See Detail  














  06/09/19 06/09/19 06/10/19





  12:29 19:40 05:20


 


WBC   9.0  7.1


 


RBC   2.59 L  2.49 L


 


Hgb   7.8 L  7.6 L


 


Hct   22.7 L  21.5 L


 


MCV   87.7  86.5


 


MCH   30.0  30.6


 


MCHC   34.2  35.4


 


RDW   17.0 H  16.8 H


 


Plt Count   148  D  159


 


MPV   8.2  7.5


 


Manual Slide Review   Reviewed 


 


Platelet Comment   Adq 


 


Sodium   


 


Potassium   


 


Chloride   


 


Carbon Dioxide   


 


Anion Gap   


 


BUN   


 


Creatinine   


 


Est GFR (CKD-EPI)AfAm   


 


Est GFR (CKD-EPI)NonAf   


 


POC Glucometer  133  


 


Random Glucose   


 


Calcium   


 


Phosphorus   


 


Magnesium   


 


Direct Bilirubin   


 


LD Total   


 


Triglycerides   


 


Cholesterol   


 


Total LDL Cholesterol   


 


HDL Cholesterol   


 


Blood Type   


 


Antibody Screen   


 


Crossmatch   














  06/10/19 06/10/19





  05:20 05:20


 


WBC  


 


RBC  


 


Hgb  


 


Hct  


 


MCV  


 


MCH  


 


MCHC  


 


RDW  


 


Plt Count  


 


MPV  


 


Manual Slide Review  


 


Platelet Comment  


 


Sodium  141 


 


Potassium  3.9 


 


Chloride  108 H 


 


Carbon Dioxide  30 


 


Anion Gap  3 L 


 


BUN  9.5 


 


Creatinine  0.9 


 


Est GFR (CKD-EPI)AfAm  105.72 


 


Est GFR (CKD-EPI)NonAf  91.22 


 


POC Glucometer  


 


Random Glucose  103 


 


Calcium  7.2 L 


 


Phosphorus  3.2 


 


Magnesium  1.5 L 


 


Direct Bilirubin  0.2 


 


LD Total  167 


 


Triglycerides   87


 


Cholesterol   92


 


Total LDL Cholesterol   47


 


HDL Cholesterol   32 L


 


Blood Type  


 


Antibody Screen  


 


Crossmatch  








Active Medications











Generic Name Dose Route Start Last Admin





  Trade Name Freq  PRN Reason Stop Dose Admin


 


Chlorhexidine Gluconate  1 applic  06/08/19 22:00  06/09/19 21:51





  Hibiclens For Decolonization -  TP   1 applic





  HS [facility]   Administration





     





     





     





     


 


Lactated Ringer's  1,000 ml in 1,000 mls @ 125 mls/hr  06/08/19 11:00  06/10/19 

07:55





  Lactated Ringers Solution  IV   125 mls/hr





  ASDIR [facility]   Administration





     





     





     





     


 


Mupirocin  1 applic  06/08/19 22:00  06/10/19 09:02





  Bactroban Ointment (For Decolonization) -  NS  06/13/19 21:59  1 applic





  BID [facility]   Administration





     





     





     





     


 


Pantoprazole Sodium  40 mg  06/10/19 10:00  06/10/19 09:03





  Protonix Iv  IVPUSH   40 mg





  DAILY [facility]   Administration





     





     





     





     


 


Trazodone HCl  300 mg  06/08/19 22:00  06/09/19 21:51





  Desyrel -  PO   300 mg





  HS [facility]   Administration





     





     





     





     











ASSESSMENT/PLAN:


This is a 62 yo m with PMH of EtOH abuse, HTN, Anxiety, MDD, who was admitted 

with massive upper GIB and hemodynamic instability





Massive GIB with hemodynamic instability


Acute Blood loss anemia normocytic 


Acute on chronic pancreatitis


EtOH abuse


HTN


anxiety 


MDD 


-s/p 5 u pRBCs hgb now 7.6, being transfused another unit, off PPI and 

octreotide gtt now on IV PPI 40 D


-hemodynamically stable; if over bleeding resumes obtain CTA abd/pelvis


-s/p EGD consistent with erythematous gastropathy w/o evidence of bleeding. 


-will need colonoscopy and maybe capsule endoscopy outpatient 


-Gi consult appreciated 


-ct ab appreciated, acute on chronic pancreatitis likely alcoholic rather than 

biliary, will f/u RUQ US


-transfer tele 








Problem List





- Problems


(1) Alcohol abuse


Code(s): F10.10 - ALCOHOL ABUSE, UNCOMPLICATED   





(2) Anemia


Code(s): D64.9 - ANEMIA, UNSPECIFIED   


Qualifiers: 


   Anemia type: unspecified type   Qualified Code(s): D64.9 - Anemia, 

unspecified   





(3) GI bleed


Code(s): K92.2 - GASTROINTESTINAL HEMORRHAGE, UNSPECIFIED   


Qualifiers: 


   GI bleed type/associated pathology: unspecified gastrointestinal hemorrhage 

type   Qualified Code(s): K92.2 - Gastrointestinal hemorrhage, unspecified   





(4) HTN (hypertension)


Code(s): I10 - ESSENTIAL (PRIMARY) HYPERTENSION   





(5) Pancreatitis


Code(s): K85.90 - ACUTE PANCREATITIS WITHOUT NECROSIS OR INFECTION, UNSP   





Visit type





- Emergency Visit


Emergency Visit: Yes


ED Registration Date: 06/08/19


Care time: The patient presented to the Emergency Department on the above date 

and was hospitalized for further evaluation of their emergent condition.





- New Patient


This patient is new to me today: Yes


Date on this admission: 06/10/19





- Critical Care


Critical Care patient: Yes


Total Critical Care Time (in minutes): 45


Critical Care Statement: The care of this patient involved high complexity 

decision making to prevent further life threatening deterioration of the patient

's condition and/or to evaluate & treat vital organ system(s) failure or risk 

of failure.





- Discharge Referral


Referred to [facility] No

## 2019-06-10 NOTE — PN
Teaching Attending Note


Name of Resident: Carly Hooper





ATTENDING PHYSICIAN STATEMENT





I saw and evaluated the patient.


I reviewed the resident's note and discussed the case with the resident.


I agree with the resident's findings and plan as documented with exceptions 

below.








SUBJECTIVE:


Patient seen and examined, Feels better, no dsypnea or weakness. Abdominal pain 

improved, tolerating clears well. 





OBJECTIVE:


 Vital Signs











 Period  Temp  Pulse  Resp  BP Sys/Goldstein  Pulse Ox


 


 Last 24 Hr  98 F-98.8 F  79-98  14-21  108-144/57-85  100-100








 Intake & Output











 06/07/19 06/08/19 06/09/19 06/10/19





 23:59 23:59 23:59 23:59


 


Intake Total  2900 3090 1000


 


Output Total  250 2550 1900


 


Balance  2650 540 -900


 


Weight  204 lb 195 lb 5 oz 195 lb








General: sitting in bed, awake, pleasant, no acute distress


Chest: CTAB, no rales or wheezing


Abdomen:soft, minimal epigastric tenderness, ND, no voluntary or involuntary 

guarding or rigidity, pos bowel sounds


Rectal: scant dark blood in ck-rectal area


Extremities: no edema


Psych: Awake, more co-operative, pleasant today





 Home Medications











 Medication  Instructions  Recorded


 


Buspirone HCl 15 mg PO 06/08/19


 


Buspirone HCl [Buspar -] mg PO BID 06/08/19


 


Metformin HCl PO DAILY 06/08/19


 


Metoprolol Succinate [Toprol Xl] 50 mg PO 06/08/19


 


Nifedipine ER [Procardia XL -]  06/08/19


 


Trazodone HCl 300 mg PO HS 06/08/19








Active Medications





Chlorhexidine Gluconate (Hibiclens For Decolonization -)  1 applic TP HS [facility]


   Last Admin: 06/09/19 21:51 Dose:  1 applic


Lactated Ringer's (Lactated Ringers Solution)  1,000 ml in 1,000 mls @ 125 mls/

hr IV ASDIR [facility]


   Last Admin: 06/10/19 07:55 Dose:  125 mls/hr


Mupirocin (Bactroban Ointment (For Decolonization) -)  1 applic NS BID [facility]


   Stop: 06/13/19 21:59


   Last Admin: 06/10/19 09:02 Dose:  1 applic


Pantoprazole Sodium (Protonix Iv)  40 mg IVPUSH DAILY [facility]


   Last Admin: 06/10/19 09:03 Dose:  40 mg


Trazodone HCl (Desyrel -)  300 mg PO HS [facility]


   Last Admin: 06/09/19 21:51 Dose:  300 mg





 Laboratory Results - last 24 hr











  06/08/19 06/09/19 06/09/19





  07:49 05:35 12:20


 


WBC    9.4


 


RBC    2.36 L


 


Hgb    7.0 L


 


Hct    20.5 L


 


MCV    86.9


 


MCH    29.8


 


MCHC    34.3


 


RDW    17.7 H


 


Plt Count   174.6  240  D


 


MPV    8.0


 


Manual Slide Review   


 


Platelet Comment   


 


Sodium   


 


Potassium   


 


Chloride   


 


Carbon Dioxide   


 


Anion Gap   


 


BUN   


 


Creatinine   


 


Est GFR (CKD-EPI)AfAm   


 


Est GFR (CKD-EPI)NonAf   


 


POC Glucometer   


 


Random Glucose   


 


Calcium   


 


Phosphorus   


 


Magnesium   


 


Direct Bilirubin   


 


LD Total   


 


Triglycerides   


 


Cholesterol   


 


Total LDL Cholesterol   


 


HDL Cholesterol   


 


Blood Type  A POSITIVE  


 


Antibody Screen  Negative  


 


Crossmatch  See Detail  














  06/09/19 06/09/19 06/10/19





  12:29 19:40 05:20


 


WBC   9.0  7.1


 


RBC   2.59 L  2.49 L


 


Hgb   7.8 L  7.6 L


 


Hct   22.7 L  21.5 L


 


MCV   87.7  86.5


 


MCH   30.0  30.6


 


MCHC   34.2  35.4


 


RDW   17.0 H  16.8 H


 


Plt Count   148  D  159


 


MPV   8.2  7.5


 


Manual Slide Review   Reviewed 


 


Platelet Comment   Adq 


 


Sodium   


 


Potassium   


 


Chloride   


 


Carbon Dioxide   


 


Anion Gap   


 


BUN   


 


Creatinine   


 


Est GFR (CKD-EPI)AfAm   


 


Est GFR (CKD-EPI)NonAf   


 


POC Glucometer  133  


 


Random Glucose   


 


Calcium   


 


Phosphorus   


 


Magnesium   


 


Direct Bilirubin   


 


LD Total   


 


Triglycerides   


 


Cholesterol   


 


Total LDL Cholesterol   


 


HDL Cholesterol   


 


Blood Type   


 


Antibody Screen   


 


Crossmatch   














  06/10/19 06/10/19





  05:20 05:20


 


WBC  


 


RBC  


 


Hgb  


 


Hct  


 


MCV  


 


MCH  


 


MCHC  


 


RDW  


 


Plt Count  


 


MPV  


 


Manual Slide Review  


 


Platelet Comment  


 


Sodium  141 


 


Potassium  3.9 


 


Chloride  108 H 


 


Carbon Dioxide  30 


 


Anion Gap  3 L 


 


BUN  9.5 


 


Creatinine  0.9 


 


Est GFR (CKD-EPI)AfAm  105.72 


 


Est GFR (CKD-EPI)NonAf  91.22 


 


POC Glucometer  


 


Random Glucose  103 


 


Calcium  7.2 L 


 


Phosphorus  3.2 


 


Magnesium  1.5 L 


 


Direct Bilirubin  0.2 


 


LD Total  167 


 


Triglycerides   87


 


Cholesterol   92


 


Total LDL Cholesterol   47


 


HDL Cholesterol   32 L


 


Blood Type  


 


Antibody Screen  


 


Crossmatch  














ASSESSMENT AND PLAN:


63 yom with PMHx of HTN, ETOH abuse, Anxiety, major depression admitted with 

acute gastrointestinal haemorrhage with hemodynamic instability.





-Acute massive gastrointestinal haemorrhage,s/p EGD, r/o small bowel/colon 

etiology, ?AVM, vs ischemic/inflammatory vs diverticular


-Acute severe blood loss anemia


-Acute pancreatitis, suspect alcohol mediated, less likely GB etiology


-h/o HTN


-Anxiety


-Major depression


-h/o Suicidal ideation requiring inpatient psychiatric hospitalization





Plan:


s/p EGD with erythematous gastropathy but no active bleed.


s/p 5 units PRBC, h/h noted. Transfuse 1 more unit today


Still with ongoing dark BM and rectal exam with some blood though markedly 

improved. 


Hemodynamics stable. 


Closely monitor H/h. 


PO clears, likely needs colonoscopy, follow up with GI. 


Off protonix/Octreotide drips. Continue PPI BID. 


CT A/P noted. Supportive treatment. RUQ US. Suspect alcohol etiology for 

pancreatitis. 


Currently on nifedipine/metoprolol/buspiron/buproprion/metformin/trazodone at 

home, unsure of doses. Hold for now


[facility].


DVTPPX SCDs


Dispo agree with transfer to telemetry. 


Plan discussed with patient in detail, all questions answered. 


Care co-ordinated with ICU


Total critical care time spent 37 min.

## 2019-06-11 LAB
ALBUMIN SERPL-MCNC: 2 G/DL (ref 3.4–5)
ALP SERPL-CCNC: 39 U/L (ref 45–117)
ALT SERPL-CCNC: 12 U/L (ref 13–61)
ANION GAP SERPL CALC-SCNC: 4 MMOL/L (ref 8–16)
AST SERPL-CCNC: 17 U/L (ref 15–37)
BILIRUB CONJ SERPL-MCNC: 0.3 MG/DL (ref 0–0.2)
BILIRUB SERPL-MCNC: 0.6 MG/DL (ref 0.2–1)
BUN SERPL-MCNC: 5.6 MG/DL (ref 7–18)
CALCIUM SERPL-MCNC: 7.2 MG/DL (ref 8.5–10.1)
CHLORIDE SERPL-SCNC: 106 MMOL/L (ref 98–107)
CO2 SERPL-SCNC: 29 MMOL/L (ref 21–32)
CREAT SERPL-MCNC: 0.9 MG/DL (ref 0.55–1.3)
DEPRECATED RDW RBC AUTO: 16.7 % (ref 11.9–15.9)
DEPRECATED RDW RBC AUTO: 17.2 % (ref 11.9–15.9)
GLUCOSE SERPL-MCNC: 90 MG/DL (ref 74–106)
HCT VFR BLD CALC: 23.9 % (ref 35.4–49)
HCT VFR BLD CALC: 26 % (ref 35.4–49)
HGB BLD-MCNC: 8.3 GM/DL (ref 11.7–16.9)
HGB BLD-MCNC: 8.9 GM/DL (ref 11.7–16.9)
LIPASE SERPL-CCNC: 1986 U/L (ref 73–393)
MAGNESIUM SERPL-MCNC: 1.7 MG/DL (ref 1.8–2.4)
MCH RBC QN AUTO: 30.3 PG (ref 25.7–33.7)
MCH RBC QN AUTO: 30.4 PG (ref 25.7–33.7)
MCHC RBC AUTO-ENTMCNC: 34.3 G/DL (ref 32–35.9)
MCHC RBC AUTO-ENTMCNC: 34.8 G/DL (ref 32–35.9)
MCV RBC: 87.3 FL (ref 80–96)
MCV RBC: 88.2 FL (ref 80–96)
PHOSPHATE SERPL-MCNC: 4 MG/DL (ref 2.5–4.9)
PLATELET # BLD AUTO: 179 K/MM3 (ref 134–434)
PLATELET # BLD AUTO: 184 K/MM3 (ref 134–434)
PMV BLD: 7.5 FL (ref 7.5–11.1)
PMV BLD: 8 FL (ref 7.5–11.1)
POTASSIUM SERPLBLD-SCNC: 3.5 MMOL/L (ref 3.5–5.1)
PROT SERPL-MCNC: 4.4 G/DL (ref 6.4–8.2)
RBC # BLD AUTO: 2.73 M/MM3 (ref 4–5.6)
RBC # BLD AUTO: 2.94 M/MM3 (ref 4–5.6)
SODIUM SERPL-SCNC: 139 MMOL/L (ref 136–145)
WBC # BLD AUTO: 6.2 K/MM3 (ref 4–10)
WBC # BLD AUTO: 6.4 K/MM3 (ref 4–10)

## 2019-06-11 RX ADMIN — BUSPIRONE HYDROCHLORIDE SCH MG: 10 TABLET ORAL at 23:04

## 2019-06-11 RX ADMIN — SODIUM CHLORIDE, POTASSIUM CHLORIDE, SODIUM LACTATE AND CALCIUM CHLORIDE SCH MLS/HR: 600; 310; 30; 20 INJECTION, SOLUTION INTRAVENOUS at 15:45

## 2019-06-11 RX ADMIN — TRAZODONE HYDROCHLORIDE SCH MG: 100 TABLET ORAL at 23:06

## 2019-06-11 RX ADMIN — BUSPIRONE HYDROCHLORIDE SCH MG: 10 TABLET ORAL at 12:26

## 2019-06-11 RX ADMIN — SODIUM CHLORIDE, POTASSIUM CHLORIDE, SODIUM LACTATE AND CALCIUM CHLORIDE SCH MLS/HR: 600; 310; 30; 20 INJECTION, SOLUTION INTRAVENOUS at 06:50

## 2019-06-11 NOTE — PN
Physical Exam: 


SUBJECTIVE: Patient seen and examined





resting in bed nad. afebrile hemodynamically stable. no acute events. melena 

yesterday. denies abd pain, b/b/d/c. tolerating clears 


OBJECTIVE:





 Vital Signs











 Period  Temp  Pulse  Resp  BP Sys/Goldstein  Pulse Ox


 


 Last 24 Hr  97.9 F-99.2 F  63-97  14-20  115-141/44-84  97











GENERAL: The patient is awake, alert, and fully oriented, in no acute distress.


HEAD: Normal with no signs of trauma.


EYES: PERRL, extraocular movements intact, sclera anicteric, conjunctiva clear. 

No ptosis. 


ENT: moist mucous membranes.


NECK: supple. 


LUNGS: Breath sounds equal, clear to auscultation bilaterally


HEART: Regular rate and rhythm, S1, S2 


ABDOMEN: Soft, nontender, nondistended, normoactive bowel sounds, no guarding, 

no 


rebound, no hepatosplenomegaly, no masses.


EXTREMITIES: no edema. 


NEUROLOGICAL: Cranial nerves II through XII grossly intact. Normal speech, gait 

not 


observed.


PSYCH: Normal mood, normal affect.


SKIN: Warm, dry














 Laboratory Results - last 24 hr











  06/08/19 06/10/19 06/10/19





  07:49 05:20 05:20


 


WBC   


 


RBC   


 


Hgb   


 


Hct   


 


MCV   


 


MCH   


 


MCHC   


 


RDW   


 


Plt Count   159 


 


MPV   


 


Absolute Neuts (auto)   


 


Neutrophils %   


 


Lymphocytes %   


 


Monocytes %   


 


Eosinophils %   


 


Basophils %   


 


Nucleated RBC %   


 


Haptoglobin   


 


Sodium   


 


Potassium   


 


Chloride   


 


Carbon Dioxide   


 


Anion Gap   


 


BUN   


 


Creatinine   


 


Est GFR (CKD-EPI)AfAm   


 


Est GFR (CKD-EPI)NonAf   


 


Random Glucose   


 


Calcium   


 


Phosphorus   


 


Magnesium   


 


Total Bilirubin    0.6


 


Direct Bilirubin    0.2


 


AST    17


 


ALT    11 L


 


Alkaline Phosphatase    36 L


 


Total Protein    4.3 L


 


Albumin    2.0 L


 


Triglycerides    87


 


Cholesterol    92


 


Total LDL Cholesterol    47


 


HDL Cholesterol    32 L


 


Blood Type  A POSITIVE  


 


Antibody Screen  Negative  


 


Crossmatch  See Detail  














  06/10/19 06/10/19 06/11/19





  05:20 17:05 06:00


 


WBC   7.7 


 


RBC   3.04 L 


 


Hgb   9.1 L 


 


Hct   26.8 L D 


 


MCV   88.0 


 


MCH   30.0 


 


MCHC   34.1 


 


RDW   16.6 H 


 


Plt Count   176 


 


MPV   8.1 


 


Absolute Neuts (auto)   5.0 


 


Neutrophils %   65.0 


 


Lymphocytes %   20.5 


 


Monocytes %   10.0 


 


Eosinophils %   4.2  D 


 


Basophils %   0.3 


 


Nucleated RBC %   0 


 


Haptoglobin  46  


 


Sodium    139


 


Potassium    3.5


 


Chloride    106


 


Carbon Dioxide    29


 


Anion Gap    4 L


 


BUN    5.6 L


 


Creatinine    0.9


 


Est GFR (CKD-EPI)AfAm    105.72


 


Est GFR (CKD-EPI)NonAf    91.22


 


Random Glucose    90


 


Calcium    7.2 L


 


Phosphorus    4.0


 


Magnesium    1.7 L


 


Total Bilirubin   


 


Direct Bilirubin   


 


AST   


 


ALT   


 


Alkaline Phosphatase   


 


Total Protein   


 


Albumin   


 


Triglycerides   


 


Cholesterol   


 


Total LDL Cholesterol   


 


HDL Cholesterol   


 


Blood Type   


 


Antibody Screen   


 


Crossmatch   














  06/11/19





  06:00


 


WBC 


 


RBC 


 


Hgb 


 


Hct 


 


MCV 


 


MCH 


 


MCHC 


 


RDW 


 


Plt Count 


 


MPV 


 


Absolute Neuts (auto) 


 


Neutrophils % 


 


Lymphocytes % 


 


Monocytes % 


 


Eosinophils % 


 


Basophils % 


 


Nucleated RBC % 


 


Haptoglobin 


 


Sodium 


 


Potassium 


 


Chloride 


 


Carbon Dioxide 


 


Anion Gap 


 


BUN 


 


Creatinine 


 


Est GFR (CKD-EPI)AfAm 


 


Est GFR (CKD-EPI)NonAf 


 


Random Glucose 


 


Calcium 


 


Phosphorus 


 


Magnesium 


 


Total Bilirubin  0.6


 


Direct Bilirubin  0.3 H


 


AST  17


 


ALT  12 L


 


Alkaline Phosphatase  39 L


 


Total Protein  4.4 L


 


Albumin  2.0 L


 


Triglycerides 


 


Cholesterol 


 


Total LDL Cholesterol 


 


HDL Cholesterol 


 


Blood Type 


 


Antibody Screen 


 


Crossmatch 








Active Medications











Generic Name Dose Route Start Last Admin





  Trade Name Freq  PRN Reason Stop Dose Admin


 


Lactated Ringer's  1,000 ml in 1,000 mls @ 125 mls/hr  06/10/19 15:22  06/11/19 

06:50





  Lactated Ringers Solution  IV   125 mls/hr





  ASDIR [facility]   Administration





     





     





     





     


 


Magnesium Sulfate  1 gm  06/11/19 08:06  





  Magnesium Sulfate  IVPB  06/11/19 08:07  





  ONCE ONE   





     





     





     





     


 


Pantoprazole Sodium  40 mg  06/11/19 10:00  





  Protonix Iv  IVPUSH   





  DAILY [facility]   





     





     





     





     


 


Trazodone HCl  300 mg  06/10/19 22:00  06/10/19 23:06





  Desyrel -  PO   300 mg





  HS [facility]   Administration





     





     





     





     











ASSESSMENT/PLAN:


This is a 62 yo m with PMH of EtOH abuse, HTN, Anxiety, MDD, who was admitted 

with massive upper GIB and hemodynamic instability





Massive GIB with hemodynamic instability


Acute Blood loss anemia normocytic 


Acute on chronic pancreatitis


EtOH abuse


HTN


anxiety 


MDD 


-s/p 5 u pRBCs hgb 8.3 dropped from 9.1, on IV PPI 40 D


-hemodynamically stable; if over bleeding resumes obtain CTA abd/pelvis


-s/p EGD consistent with erythematous gastropathy w/o evidence of bleeding. 


-will need colonoscopy inpatient preferably soon given evidence of ongoing 

bleed and maybe capsule endoscopy outpatient 


-f/u GI recs  


-ct ab appreciated, acute on chronic pancreatitis likely alcoholic rather than 

biliary; abd US BCD 9 mm and fatty liver. 


-lipase trending down








Problem List





- Problems


(1) Alcohol abuse


Code(s): F10.10 - ALCOHOL ABUSE, UNCOMPLICATED   





(2) Anemia


Code(s): D64.9 - ANEMIA, UNSPECIFIED   


Qualifiers: 


   Anemia type: unspecified type   Qualified Code(s): D64.9 - Anemia, 

unspecified   





(3) GI bleed


Code(s): K92.2 - GASTROINTESTINAL HEMORRHAGE, UNSPECIFIED   


Qualifiers: 


   GI bleed type/associated pathology: unspecified gastrointestinal hemorrhage 

type   Qualified Code(s): K92.2 - Gastrointestinal hemorrhage, unspecified   





(4) HTN (hypertension)


Code(s): I10 - ESSENTIAL (PRIMARY) HYPERTENSION   





(5) Pancreatitis


Code(s): K85.90 - ACUTE PANCREATITIS WITHOUT NECROSIS OR INFECTION, UNSP   





Visit type





- Emergency Visit


Emergency Visit: Yes


ED Registration Date: 06/08/19


Care time: The patient presented to the Emergency Department on the above date 

and was hospitalized for further evaluation of their emergent condition.





- New Patient


This patient is new to me today: No





- Critical Care


Critical Care patient: No





- Discharge Referral


Referred to [facility] No

## 2019-06-11 NOTE — PN.GI
GI Progress Note


Subjective: 





No overt bleeeding


no abdominal pain





- Objective


Vital Signs: 


 Vital Signs











Temperature  98.9 F   06/11/19 10:00


 


Pulse Rate  83   06/11/19 10:00


 


Respiratory Rate  20   06/11/19 10:00


 


Blood Pressure  132/79   06/11/19 10:00


 


O2 Sat by Pulse Oximetry (%)  97   06/11/19 09:00











Constitutional: Calm


Eyes: No: Sclera Icterus


Cardiovascular: Yes: Regular Rate and Rhythm.  No: Murmur


Respiratory: Yes: CTA Bilaterally


Gastrointestinal Inspection: No: Distention, Scars


...Auscultate: Yes: Normoactive Bowel Sounds


...Palpate: No: Hepatomegaly, Splenomegaly, Tenderness


...Percussion: No: Tympanitic


...Rectal Exam: Yes: Other (No external lesions, no masses, brown stool, no 

melena or gross blood.)


Edema: No (No LE edema)


Labs: 


 CBC, BMP





 06/11/19 06:00 





 06/11/19 06:00 





 INR, PTT











INR  1.23  (0.83-1.09)  H  06/08/19  07:49    














Problem List





- Problems


(1) Pancreatitis


Assessment/Plan: 


Clinically asymptomatic


Tolerating clears


Advised the need for complete alcohol cessation





Code(s): K85.90 - ACUTE PANCREATITIS WITHOUT NECROSIS OR INFECTION, UNSP   





(2) GI bleed


Assessment/Plan: 


No overt bleeding with stable hemodynamics despite drop in H/H


Discussed colonoscopy for completion of evaluation.  patient states that his 

last colonoscopy was 10 years ago and normal.  Discussed potential risks of the 

procedure like but not limited to bleeding, perforation requiring surgery to 

repair, infection and sedation medication effects all of which could be 

potentially life threatening. he has agreed to the procedure.  Consent 

obtained. 


For now:


Clears


NPO after midnight


Bowel prep ordered


AM labs








Code(s): K92.2 - GASTROINTESTINAL HEMORRHAGE, UNSPECIFIED   


Qualifiers: 


   GI bleed type/associated pathology: unspecified gastrointestinal hemorrhage 

type   Qualified Code(s): K92.2 - Gastrointestinal hemorrhage, unspecified

## 2019-06-11 NOTE — PN
Teaching Attending Note


Name of Resident: Carly Hooper





ATTENDING PHYSICIAN STATEMENT





I saw and evaluated the patient.


I reviewed the resident's note and discussed the case with the resident.


I agree with the resident's findings and plan as documented with exceptions 

below.








SUBJECTIVE:


Patient seen and examined, occasional dizziness but improved. Abdominal pain 

almost resolved, tolerating clears well. Overall unchanged "maroon" stools as 

discussed. Overall feels well. 





OBJECTIVE:


 Vital Signs











 Period  Temp  Pulse  Resp  BP Sys/Goldstein  Pulse Ox


 


 Last 24 Hr  97.9 F-99.2 F  63-97  16-20  115-141/44-82  97








 Intake & Output











 06/08/19 06/09/19 06/10/19 06/11/19





 23:59 23:59 23:59 23:59


 


Intake Total 2900 3090 1710 625


 


Output Total 250 2550 3000 


 


Balance 2650 540 -1290 625


 


Weight 204 lb 195 lb 5 oz 195 lb 203 lb 6.4 oz








General: sitting in bed, pleasant, no acute distress


Chest: CTAB, no rales or wheezing


Abdomen:soft, minimal epigastric tenderness, no voluntary or involuntary 

guarding or rigidity, pos bowel sounds


Extremities: no edema





 Home Medications











 Medication  Instructions  Recorded


 


Buspirone HCl [Buspar -] 10 mg PO BID 06/08/19


 


Metformin HCl 500 mg PO DAILY 06/08/19


 


Metoprolol Succinate [Toprol Xl] 25 mg PO DAILY 06/08/19


 


Nifedipine ER [Procardia XL -] 30 mg PO DAILY 06/08/19


 


Trazodone HCl 300 mg PO HS 06/08/19


 


Bupropion HCl [Bupropion HCl ER] 200 mg PO BID 06/10/19


 


Omeprazole 40 mg PO DAILY 06/10/19








Active Medications





Bupropion HCl (Wellbutrin -)  200 mg PO BID [facility]


Buspirone HCl (Buspar -)  10 mg PO BID [facility]


Lactated Ringer's (Lactated Ringers Solution)  1,000 ml in 1,000 mls @ 100 mls/

hr IV ASDIR [facility]


Pantoprazole Sodium (Protonix Iv)  40 mg IVPUSH DAILY [facility]


   Last Admin: 06/11/19 10:10 Dose:  40 mg


Trazodone HCl (Desyrel -)  300 mg PO HS [facility]


   Last Admin: 06/10/19 23:06 Dose:  300 mg





 Laboratory Results - last 24 hr











  06/08/19 06/10/19 06/10/19





  07:49 05:20 05:20


 


WBC   


 


RBC   


 


Hgb   


 


Hct   


 


MCV   


 


MCH   


 


MCHC   


 


RDW   


 


Plt Count   


 


MPV   


 


Absolute Neuts (auto)   


 


Neutrophils %   


 


Lymphocytes %   


 


Monocytes %   


 


Eosinophils %   


 


Basophils %   


 


Nucleated RBC %   


 


Haptoglobin    46


 


Sodium   


 


Potassium   


 


Chloride   


 


Carbon Dioxide   


 


Anion Gap   


 


BUN   


 


Creatinine   


 


Est GFR (CKD-EPI)AfAm   


 


Est GFR (CKD-EPI)NonAf   


 


Random Glucose   


 


Calcium   


 


Phosphorus   


 


Magnesium   


 


Ferritin   


 


Total Bilirubin   0.6 


 


Direct Bilirubin   0.2 


 


AST   17 


 


ALT   11 L 


 


Alkaline Phosphatase   36 L 


 


Total Protein   4.3 L 


 


Albumin   2.0 L 


 


Triglycerides   87 


 


Cholesterol   92 


 


Total LDL Cholesterol   47 


 


HDL Cholesterol   32 L 


 


Lipase   


 


Vitamin B12   


 


Blood Type  A POSITIVE  


 


Antibody Screen  Negative  


 


Crossmatch  See Detail  














  06/10/19 06/11/19 06/11/19





  17:05 06:00 06:00


 


WBC  7.7  6.2 


 


RBC  3.04 L  2.73 L 


 


Hgb  9.1 L  8.3 L 


 


Hct  26.8 L D  23.9 L 


 


MCV  88.0  87.3 


 


MCH  30.0  30.4 


 


MCHC  34.1  34.8 


 


RDW  16.6 H  16.7 H 


 


Plt Count  176  179 


 


MPV  8.1  8.0 


 


Absolute Neuts (auto)  5.0  


 


Neutrophils %  65.0  


 


Lymphocytes %  20.5  


 


Monocytes %  10.0  


 


Eosinophils %  4.2  D  


 


Basophils %  0.3  


 


Nucleated RBC %  0  


 


Haptoglobin   


 


Sodium    139


 


Potassium    3.5


 


Chloride    106


 


Carbon Dioxide    29


 


Anion Gap    4 L


 


BUN    5.6 L


 


Creatinine    0.9


 


Est GFR (CKD-EPI)AfAm    105.72


 


Est GFR (CKD-EPI)NonAf    91.22


 


Random Glucose    90


 


Calcium    7.2 L


 


Phosphorus    4.0


 


Magnesium    1.7 L


 


Ferritin   


 


Total Bilirubin   


 


Direct Bilirubin   


 


AST   


 


ALT   


 


Alkaline Phosphatase   


 


Total Protein   


 


Albumin   


 


Triglycerides   


 


Cholesterol   


 


Total LDL Cholesterol   


 


HDL Cholesterol   


 


Lipase    1986 H


 


Vitamin B12   


 


Blood Type   


 


Antibody Screen   


 


Crossmatch   














  06/11/19 06/11/19





  06:00 06:00


 


WBC  


 


RBC  


 


Hgb  


 


Hct  


 


MCV  


 


MCH  


 


MCHC  


 


RDW  


 


Plt Count  


 


MPV  


 


Absolute Neuts (auto)  


 


Neutrophils %  


 


Lymphocytes %  


 


Monocytes %  


 


Eosinophils %  


 


Basophils %  


 


Nucleated RBC %  


 


Haptoglobin  


 


Sodium  


 


Potassium  


 


Chloride  


 


Carbon Dioxide  


 


Anion Gap  


 


BUN  


 


Creatinine  


 


Est GFR (CKD-EPI)AfAm  


 


Est GFR (CKD-EPI)NonAf  


 


Random Glucose  


 


Calcium  


 


Phosphorus  


 


Magnesium  


 


Ferritin  134.2 


 


Total Bilirubin   0.6


 


Direct Bilirubin   0.3 H


 


AST   17


 


ALT   12 L


 


Alkaline Phosphatase   39 L


 


Total Protein   4.4 L


 


Albumin   2.0 L


 


Triglycerides  


 


Cholesterol  


 


Total LDL Cholesterol  


 


HDL Cholesterol  


 


Lipase  


 


Vitamin B12  908 


 


Blood Type  


 


Antibody Screen  


 


Crossmatch  














ASSESSMENT AND PLAN:


63 yom with PMHx of HTN, ETOH abuse, Anxiety, major depression admitted with 

acute gastrointestinal haemorrhage with hemodynamic instability.





-Acute massive gastrointestinal haemorrhage,s/p EGD, r/o small bowel/colon 

etiology, ?AVM, vs ischemic/inflammatory vs diverticular


-Acute severe blood loss anemia


-Acute pancreatitis, suspect alcohol mediated, less likely GB etiology


-h/o HTN


-Anxiety


-Major depression


-h/o Suicidal ideation requiring inpatient psychiatric hospitalization





Plan:


s/p EGD with erythematous gastropathy but no active bleed.


s/p 6 units PRBC, h/h noted. 


Hb trending down. Tranfuse for Hb <8


Hemodynamics stable. Monitor h/h and hemodynamics closely. 


Follow up with GI for additional testing/colonoscopy. 


PO clears as maribell. 


Off protonix/Octreotide drips. Continue PPI. 


CT A/P noted. Suspect alcohol etiology for pancreatitis. RUQ US noted. LFTs 

normal. MRCP likely outpatient once active bleed concerns resolved. 


Home meds reconciled.


Resume buspirone/Buproprion/Trazodone. Hold anti-hypertensives. 


[facility].


DVTPPX SCDs


Dispo agree with transfer to telemetry. 


Plan discussed with patient in detail, all questions answered.

## 2019-06-12 LAB
ALBUMIN SERPL-MCNC: 2.2 G/DL (ref 3.4–5)
ALP SERPL-CCNC: 45 U/L (ref 45–117)
ALT SERPL-CCNC: 12 U/L (ref 13–61)
ANION GAP SERPL CALC-SCNC: 5 MMOL/L (ref 8–16)
AST SERPL-CCNC: 17 U/L (ref 15–37)
BASOPHILS # BLD: 0.9 % (ref 0–2)
BILIRUB SERPL-MCNC: 0.6 MG/DL (ref 0.2–1)
BUN SERPL-MCNC: 3.5 MG/DL (ref 7–18)
CALCIUM SERPL-MCNC: 7.5 MG/DL (ref 8.5–10.1)
CHLORIDE SERPL-SCNC: 106 MMOL/L (ref 98–107)
CO2 SERPL-SCNC: 29 MMOL/L (ref 21–32)
CREAT SERPL-MCNC: 0.9 MG/DL (ref 0.55–1.3)
DEPRECATED RDW RBC AUTO: 17.3 % (ref 11.9–15.9)
EOSINOPHIL # BLD: 6.5 % (ref 0–4.5)
GLUCOSE SERPL-MCNC: 94 MG/DL (ref 74–106)
HCT VFR BLD CALC: 24.5 % (ref 35.4–49)
HGB BLD-MCNC: 8.5 GM/DL (ref 11.7–16.9)
INR BLD: 1.14 (ref 0.83–1.09)
LYMPHOCYTES # BLD: 26.3 % (ref 8–40)
MAGNESIUM SERPL-MCNC: 1.8 MG/DL (ref 1.8–2.4)
MCH RBC QN AUTO: 30.7 PG (ref 25.7–33.7)
MCHC RBC AUTO-ENTMCNC: 34.7 G/DL (ref 32–35.9)
MCV RBC: 88.4 FL (ref 80–96)
MONOCYTES # BLD AUTO: 12 % (ref 3.8–10.2)
NEUTROPHILS # BLD: 54.3 % (ref 42.8–82.8)
PHOSPHATE SERPL-MCNC: 3.6 MG/DL (ref 2.5–4.9)
PLATELET # BLD AUTO: 192 K/MM3 (ref 134–434)
PMV BLD: 7.9 FL (ref 7.5–11.1)
POTASSIUM SERPLBLD-SCNC: 3.6 MMOL/L (ref 3.5–5.1)
PROT SERPL-MCNC: 4.7 G/DL (ref 6.4–8.2)
PT PNL PPP: 13.5 SEC (ref 9.7–13)
RBC # BLD AUTO: 2.77 M/MM3 (ref 4–5.6)
SERUM IRON SATURATION: 10 % (ref 15–55)
SODIUM SERPL-SCNC: 139 MMOL/L (ref 136–145)
TIBC SERPL-MCNC: 195 UG/DL (ref 250–450)
WBC # BLD AUTO: 5.2 K/MM3 (ref 4–10)

## 2019-06-12 PROCEDURE — 0DBL8ZX EXCISION OF TRANSVERSE COLON, VIA NATURAL OR ARTIFICIAL OPENING ENDOSCOPIC, DIAGNOSTIC: ICD-10-PCS

## 2019-06-12 RX ADMIN — SODIUM CHLORIDE, POTASSIUM CHLORIDE, SODIUM LACTATE AND CALCIUM CHLORIDE SCH MLS/HR: 600; 310; 30; 20 INJECTION, SOLUTION INTRAVENOUS at 12:00

## 2019-06-12 RX ADMIN — PANTOPRAZOLE SODIUM SCH MG: 20 TABLET, DELAYED RELEASE ORAL at 17:45

## 2019-06-12 RX ADMIN — BUSPIRONE HYDROCHLORIDE SCH: 10 TABLET ORAL at 10:00

## 2019-06-12 RX ADMIN — BUSPIRONE HYDROCHLORIDE SCH MG: 10 TABLET ORAL at 21:17

## 2019-06-12 RX ADMIN — TRAZODONE HYDROCHLORIDE SCH MG: 100 TABLET ORAL at 23:33

## 2019-06-12 NOTE — PN
Progress Note (short form)





- Note


Progress Note: 





Colonoscopy performed today revealing 2 diminutive transverse colon polyps 

removed, and sigmoid diverticulosis. There was some residual greenish stool 

mostly in right colon/cecum, large lesions not seen however smaller or flat 

lesions could have been missed. Otherwise unremarkable exam. No blood seen.





Recommendations:


-Monitor for further bleeding


-Follow up pathology results


-PPI daily


-Repeat colonoscopy in 1 year for surveillance purposes pending path


-If further bleeding may consider repeat endoscopy and possible push 

enteroscopy if dark stools/melena as otherwise no obvious findings on recent 

EGD or colon to explain source of bleed.

## 2019-06-12 NOTE — PN
Teaching Attending Note


Name of Resident: Carly Hooper





ATTENDING PHYSICIAN STATEMENT





I saw and evaluated the patient.


I reviewed the resident's note and discussed the case with the resident.


I agree with the resident's findings and plan as documented.








SUBJECTIVE: Mr Whitehead is without complaint today. Denies cp, sob, n/v.








OBJECTIVE:


 Last Vital Signs











Temp Pulse Resp BP Pulse Ox


 


 36.6 C   71   18   105/48 L  99 


 


 06/12/19 13:25  06/12/19 13:25  06/12/19 13:25  06/12/19 13:25  06/12/19 13:25








Gen: nad


Pulm: ctab w/o w/r/r


CV: rrr w/o m/r/g


Abd: +bs, s/nt/nd


Ext: no c/c/e





 CBC, BMP





 06/12/19 05:59 





 06/12/19 05:59 














ASSESSMENT AND PLAN:








Problem List





- Problems


(1) GI bleed


Assessment/Plan: 


-appreciate GI assistance


-appears resolved, H/H stable


-s/p EGD


-colonoscopy today 


-follow up results


-continue protonix


Code(s): K92.2 - GASTROINTESTINAL HEMORRHAGE, UNSPECIFIED   


Qualifiers: 


   GI bleed type/associated pathology: unspecified gastrointestinal hemorrhage 

type   Qualified Code(s): K92.2 - Gastrointestinal hemorrhage, unspecified   





(2) Anemia


Assessment/Plan: 


-s/p 4 units


-currently stable


-follow up colonoscopy results


Code(s): D64.9 - ANEMIA, UNSPECIFIED   


Qualifiers: 


   Anemia type: unspecified type   Qualified Code(s): D64.9 - Anemia, 

unspecified   





(3) HTN (hypertension)


Assessment/Plan: 


-controlled


-monitor


Code(s): I10 - ESSENTIAL (PRIMARY) HYPERTENSION   





(4) Alcohol abuse


Assessment/Plan: 


-counseled cessation


Code(s): F10.10 - ALCOHOL ABUSE, UNCOMPLICATED   





(5) Pancreatitis


Assessment/Plan: 


-asymptomatic


-advance diet as tolerated


Code(s): K85.90 - ACUTE PANCREATITIS WITHOUT NECROSIS OR INFECTION, UNSP

## 2019-06-12 NOTE — PN
Physical Exam: 


SUBJECTIVE: Patient seen and examined





resting in bed nad. afebrile hemodynamically stable. no acute events. brown 

stool yesterday. denies abd pain, b/b/d/c. npo awaiting colonoscopy 





OBJECTIVE:





 Vital Signs











 Period  Temp  Pulse  Resp  BP Sys/Goldstein  Pulse Ox


 


 Last 24 Hr  98.2 F-98.9 F  80-89  18-20  118-154/78-99  











GENERAL: The patient is awake, alert, and fully oriented, in no acute distress.


HEAD: Normal with no signs of trauma.


EYES: PERRL, extraocular movements intact, sclera anicteric, conjunctiva clear. 

No ptosis. 


ENT: moist mucous membranes.


NECK: supple. 


LUNGS: Breath sounds equal, clear to auscultation bilaterally


HEART: Regular rate and rhythm, S1, S2 


ABDOMEN: Soft, nontender, nondistended, normoactive bowel sounds, no guarding, 

no 


rebound, no hepatosplenomegaly, no masses.


EXTREMITIES: no edema. 


NEUROLOGICAL: Cranial nerves II through XII grossly intact. Normal speech, gait 

not 


observed.


PSYCH: Normal mood, normal affect.


SKIN: Warm, dry

















 Laboratory Results - last 24 hr











  06/08/19 06/11/19 06/11/19





  07:49 06:00 06:00


 


WBC   6.2 


 


RBC   2.73 L 


 


Hgb   8.3 L 


 


Hct   23.9 L 


 


MCV   87.3 


 


MCH   30.4 


 


MCHC   34.8 


 


RDW   16.7 H 


 


Plt Count   179 


 


MPV   8.0 


 


Sodium    139


 


Potassium    3.5


 


Chloride    106


 


Carbon Dioxide    29


 


Anion Gap    4 L


 


BUN    5.6 L


 


Creatinine    0.9


 


Est GFR (CKD-EPI)AfAm    105.72


 


Est GFR (CKD-EPI)NonAf    91.22


 


Random Glucose    90


 


Calcium    7.2 L


 


Phosphorus    4.0


 


Magnesium    1.7 L


 


Iron   


 


TIBC   


 


Iron Saturation   


 


Transferrin   


 


Ferritin   


 


Total Bilirubin   


 


Direct Bilirubin   


 


AST   


 


ALT   


 


Alkaline Phosphatase   


 


Total Protein   


 


Albumin   


 


Lipase    1986 H


 


Vitamin B12   


 


Blood Type  A POSITIVE  


 


Antibody Screen  Negative  


 


Crossmatch  See Detail  














  06/11/19 06/11/19 06/11/19





  06:00 06:00 06:00


 


WBC   


 


RBC   


 


Hgb   


 


Hct   


 


MCV   


 


MCH   


 


MCHC   


 


RDW   


 


Plt Count   


 


MPV   


 


Sodium   


 


Potassium   


 


Chloride   


 


Carbon Dioxide   


 


Anion Gap   


 


BUN   


 


Creatinine   


 


Est GFR (CKD-EPI)AfAm   


 


Est GFR (CKD-EPI)NonAf   


 


Random Glucose   


 


Calcium   


 


Phosphorus   


 


Magnesium   


 


Iron  19 L  


 


TIBC  195 L  


 


Iron Saturation  10 L  


 


Transferrin  142 L  


 


Ferritin   134.2 


 


Total Bilirubin    0.6


 


Direct Bilirubin    0.3 H


 


AST    17


 


ALT    12 L


 


Alkaline Phosphatase    39 L


 


Total Protein    4.4 L


 


Albumin    2.0 L


 


Lipase   


 


Vitamin B12   908 


 


Blood Type   


 


Antibody Screen   


 


Crossmatch   














  06/11/19





  14:20


 


WBC  6.4


 


RBC  2.94 L


 


Hgb  8.9 L


 


Hct  26.0 L


 


MCV  88.2


 


MCH  30.3


 


MCHC  34.3


 


RDW  17.2 H


 


Plt Count  184


 


MPV  7.5


 


Sodium 


 


Potassium 


 


Chloride 


 


Carbon Dioxide 


 


Anion Gap 


 


BUN 


 


Creatinine 


 


Est GFR (CKD-EPI)AfAm 


 


Est GFR (CKD-EPI)NonAf 


 


Random Glucose 


 


Calcium 


 


Phosphorus 


 


Magnesium 


 


Iron 


 


TIBC 


 


Iron Saturation 


 


Transferrin 


 


Ferritin 


 


Total Bilirubin 


 


Direct Bilirubin 


 


AST 


 


ALT 


 


Alkaline Phosphatase 


 


Total Protein 


 


Albumin 


 


Lipase 


 


Vitamin B12 


 


Blood Type 


 


Antibody Screen 


 


Crossmatch 








Active Medications











Generic Name Dose Route Start Last Admin





  Trade Name Freq  PRN Reason Stop Dose Admin


 


Bupropion HCl  200 mg  06/11/19 11:15  06/11/19 18:44





  Wellbutrin -  PO   200 mg





  DAILY@1000,1900 SILVANA   Administration





     





     





     





     


 


Buspirone HCl  10 mg  06/11/19 10:45  06/11/19 23:04





  Buspar -  PO   10 mg





  BID SILVANA   Administration





     





     





     





     


 


Lactated Ringer's  1,000 ml in 1,000 mls @ 100 mls/hr  06/11/19 10:35  06/11/19 

15:45





  Lactated Ringers Solution  IV   100 mls/hr





  ASDIR SILVANA   Administration





     





     





     





     


 


Pantoprazole Sodium  20 mg  06/12/19 10:00  





  Protonix -  PO   





  DAILY SILVANA   





     





     





     





     


 


Trazodone HCl  300 mg  06/10/19 22:00  06/11/19 23:06





  Desyrel -  PO   300 mg





  HS SILVANA   Administration





     





     





     





     











ASSESSMENT/PLAN:


This is a 64 yo m with PMH of EtOH abuse, HTN, Anxiety, MDD, who was admitted 

with massive upper GIB and hemodynamic instability





Massive GIB with hemodynamic instability


Acute Blood loss anemia normocytic 


Acute on chronic pancreatitis


EtOH abuse


HTN


anxiety 


MDD 


-hgb 8.5, on IV PPI 40 D


-hemodynamically stable; if over bleeding resumes obtain CTA abd/pelvis


-s/p EGD consistent with erythematous gastropathy w/o evidence of bleeding. 


-awaiting colonoscopy today


-gi consult appreciated


-ct ab appreciated, acute on chronic pancreatitis likely alcoholic rather than 

biliary; abd US BCD 9 mm and fatty liver. 


-lipase trending down








Problem List





- Problems


(1) Alcohol abuse


Code(s): F10.10 - ALCOHOL ABUSE, UNCOMPLICATED   





(2) Anemia


Code(s): D64.9 - ANEMIA, UNSPECIFIED   


Qualifiers: 


   Anemia type: unspecified type   Qualified Code(s): D64.9 - Anemia, 

unspecified   





(3) GI bleed


Code(s): K92.2 - GASTROINTESTINAL HEMORRHAGE, UNSPECIFIED   


Qualifiers: 


   GI bleed type/associated pathology: unspecified gastrointestinal hemorrhage 

type   Qualified Code(s): K92.2 - Gastrointestinal hemorrhage, unspecified   





(4) HTN (hypertension)


Code(s): I10 - ESSENTIAL (PRIMARY) HYPERTENSION   





(5) Pancreatitis


Code(s): K85.90 - ACUTE PANCREATITIS WITHOUT NECROSIS OR INFECTION, UNSP   





Visit type





- Emergency Visit


Emergency Visit: Yes


ED Registration Date: 06/08/19


Care time: The patient presented to the Emergency Department on the above date 

and was hospitalized for further evaluation of their emergent condition.





- New Patient


This patient is new to me today: No





- Critical Care


Critical Care patient: No





- Discharge Referral


Referred to [facility] No

## 2019-06-13 VITALS — DIASTOLIC BLOOD PRESSURE: 80 MMHG | TEMPERATURE: 98.2 F | SYSTOLIC BLOOD PRESSURE: 133 MMHG | HEART RATE: 82 BPM

## 2019-06-13 LAB
ALBUMIN SERPL-MCNC: 2.3 G/DL (ref 3.4–5)
ALP SERPL-CCNC: 48 U/L (ref 45–117)
ALT SERPL-CCNC: 14 U/L (ref 13–61)
ANION GAP SERPL CALC-SCNC: 7 MMOL/L (ref 8–16)
AST SERPL-CCNC: 19 U/L (ref 15–37)
BASOPHILS # BLD: 0.5 % (ref 0–2)
BILIRUB SERPL-MCNC: 0.6 MG/DL (ref 0.2–1)
BUN SERPL-MCNC: 3.5 MG/DL (ref 7–18)
CALCIUM SERPL-MCNC: 7.9 MG/DL (ref 8.5–10.1)
CHLORIDE SERPL-SCNC: 104 MMOL/L (ref 98–107)
CO2 SERPL-SCNC: 30 MMOL/L (ref 21–32)
CREAT SERPL-MCNC: 1 MG/DL (ref 0.55–1.3)
DEPRECATED RDW RBC AUTO: 17.1 % (ref 11.9–15.9)
EOSINOPHIL # BLD: 5.8 % (ref 0–4.5)
GLUCOSE SERPL-MCNC: 81 MG/DL (ref 74–106)
HCT VFR BLD CALC: 27 % (ref 35.4–49)
HGB BLD-MCNC: 9.2 GM/DL (ref 11.7–16.9)
LYMPHOCYTES # BLD: 24.2 % (ref 8–40)
MAGNESIUM SERPL-MCNC: 1.6 MG/DL (ref 1.8–2.4)
MCH RBC QN AUTO: 30.3 PG (ref 25.7–33.7)
MCHC RBC AUTO-ENTMCNC: 34 G/DL (ref 32–35.9)
MCV RBC: 89.2 FL (ref 80–96)
MONOCYTES # BLD AUTO: 11.9 % (ref 3.8–10.2)
NEUTROPHILS # BLD: 57.6 % (ref 42.8–82.8)
PHOSPHATE SERPL-MCNC: 4.3 MG/DL (ref 2.5–4.9)
PLATELET # BLD AUTO: 250 K/MM3 (ref 134–434)
PMV BLD: 7.7 FL (ref 7.5–11.1)
POTASSIUM SERPLBLD-SCNC: 3.7 MMOL/L (ref 3.5–5.1)
PROT SERPL-MCNC: 5.1 G/DL (ref 6.4–8.2)
RBC # BLD AUTO: 3.03 M/MM3 (ref 4–5.6)
SODIUM SERPL-SCNC: 141 MMOL/L (ref 136–145)
WBC # BLD AUTO: 5.7 K/MM3 (ref 4–10)

## 2019-06-13 RX ADMIN — SODIUM CHLORIDE, POTASSIUM CHLORIDE, SODIUM LACTATE AND CALCIUM CHLORIDE SCH MLS/HR: 600; 310; 30; 20 INJECTION, SOLUTION INTRAVENOUS at 12:45

## 2019-06-13 RX ADMIN — BUSPIRONE HYDROCHLORIDE SCH MG: 10 TABLET ORAL at 09:51

## 2019-06-13 RX ADMIN — PANTOPRAZOLE SODIUM SCH MG: 20 TABLET, DELAYED RELEASE ORAL at 09:49

## 2019-06-13 NOTE — PN
Physical Exam: 


SUBJECTIVE: Patient seen and examined





resting in bed nad. afebrile hemodynamically stable. no acute events. brown 

stool yesterday. denies abd pain, b/b/d/c. npo awaiting colonoscopy


OBJECTIVE:





 Vital Signs











 Period  Temp  Pulse  Resp  BP Sys/Goldstein  Pulse Ox


 


 Last 24 Hr  98.1 F-98.7 F  77-95  18-20  123-140/74-83  98-99











GENERAL: The patient is awake, alert, and fully oriented, in no acute distress.


HEAD: Normal with no signs of trauma.


EYES: PERRL, extraocular movements intact, sclera anicteric, conjunctiva clear. 

No ptosis. 


ENT: moist mucous membranes.


NECK: supple. 


LUNGS: Breath sounds equal, clear to auscultation bilaterally


HEART: Regular rate and rhythm, S1, S2 


ABDOMEN: Soft, nontender, nondistended, normoactive bowel sounds, no guarding, 

no 


rebound, no hepatosplenomegaly, no masses.


EXTREMITIES: no edema. 


NEUROLOGICAL: Cranial nerves II through XII grossly intact. Normal speech, gait 

not 


observed.


PSYCH: Normal mood, normal affect.


SKIN: Warm, dry














 Laboratory Results - last 24 hr











  06/08/19 06/11/19 06/13/19





  07:49 06:00 06:33


 


WBC    5.7


 


RBC    3.03 L


 


Hgb    9.2 L


 


Hct   24.0 L  27.0 L


 


MCV    89.2


 


MCH    30.3


 


MCHC    34.0


 


RDW    17.1 H


 


Plt Count    250  D


 


MPV    7.7


 


Absolute Neuts (auto)    3.3


 


Neutrophils %    57.6


 


Lymphocytes %    24.2


 


Monocytes %    11.9 H


 


Eosinophils %    5.8 H


 


Basophils %    0.5


 


Nucleated RBC %    0


 


Sodium   


 


Potassium   


 


Chloride   


 


Carbon Dioxide   


 


Anion Gap   


 


BUN   


 


Creatinine   


 


Est GFR (CKD-EPI)AfAm   


 


Est GFR (CKD-EPI)NonAf   


 


Random Glucose   


 


Calcium   


 


Phosphorus   


 


Magnesium   


 


Total Bilirubin   


 


AST   


 


ALT   


 


Alkaline Phosphatase   


 


Total Protein   


 


Albumin   


 


Folate   1280 


 


Folate Hemolysate   307.3 


 


Blood Type  A POSITIVE  


 


Antibody Screen  Negative  


 


Crossmatch  See Detail  














  06/13/19





  06:33


 


WBC 


 


RBC 


 


Hgb 


 


Hct 


 


MCV 


 


MCH 


 


MCHC 


 


RDW 


 


Plt Count 


 


MPV 


 


Absolute Neuts (auto) 


 


Neutrophils % 


 


Lymphocytes % 


 


Monocytes % 


 


Eosinophils % 


 


Basophils % 


 


Nucleated RBC % 


 


Sodium  141


 


Potassium  3.7


 


Chloride  104


 


Carbon Dioxide  30


 


Anion Gap  7 L


 


BUN  3.5 L


 


Creatinine  1.0


 


Est GFR (CKD-EPI)AfAm  93.08


 


Est GFR (CKD-EPI)NonAf  80.31


 


Random Glucose  81


 


Calcium  7.9 L


 


Phosphorus  4.3


 


Magnesium  1.6 L


 


Total Bilirubin  0.6


 


AST  19


 


ALT  14


 


Alkaline Phosphatase  48


 


Total Protein  5.1 L


 


Albumin  2.3 L


 


Folate 


 


Folate Hemolysate 


 


Blood Type 


 


Antibody Screen 


 


Crossmatch 








Active Medications











Generic Name Dose Route Start Last Admin





  Trade Name Supaq  PRN Reason Stop Dose Admin


 


Bupropion HCl  200 mg  06/11/19 11:15  06/13/19 09:50





  Wellbutrin -  PO   200 mg





  DAILY@1000,1900 SILVANA   Administration





     





     





     





     


 


Buspirone HCl  10 mg  06/11/19 10:45  06/13/19 09:51





  Buspar -  PO   10 mg





  BID SILVANA   Administration





     





     





     





     


 


Lactated Ringer's  1,000 ml in 1,000 mls @ 100 mls/hr  06/11/19 10:35  06/13/19 

12:45





  Lactated Ringers Solution  IV   100 mls/hr





  ASDIR SILVANA   Administration





     





     





     





     


 


Pantoprazole Sodium  20 mg  06/12/19 10:00  06/13/19 09:49





  Protonix -  PO   20 mg





  DAILY SILVANA   Administration





     





     





     





     


 


Trazodone HCl  300 mg  06/10/19 22:00  06/12/19 23:33





  Desyrel -  PO   300 mg





  HS SILVANA   Administration





     





     





     





     











ASSESSMENT/PLAN:


This is a 64 yo m with PMH of EtOH abuse, HTN, Anxiety, MDD, who was admitted 

with massive upper GIB and hemodynamic instability





Massive GIB with hemodynamic instability


Acute Blood loss anemia normocytic 


Acute on chronic pancreatitis


EtOH abuse


HTN


anxiety 


MDD 


-hgb 8.5, on IV PPI 40 D


-hemodynamically stable; if over bleeding resumes obtain CTA abd/pelvis


-s/p EGD consistent with erythematous gastropathy w/o evidence of bleeding. 


-awaiting colonoscopy today


-gi consult appreciated


-ct ab appreciated, acute on chronic pancreatitis likely alcoholic rather than 

biliary; abd US BCD 9 mm and fatty liver. 


-lipase trending down





Problem List





- Problems


(1) Alcohol abuse


Code(s): F10.10 - ALCOHOL ABUSE, UNCOMPLICATED   





(2) Anemia


Code(s): D64.9 - ANEMIA, UNSPECIFIED   


Qualifiers: 


   Anemia type: unspecified type   Qualified Code(s): D64.9 - Anemia, 

unspecified   





(3) GI bleed


Code(s): K92.2 - GASTROINTESTINAL HEMORRHAGE, UNSPECIFIED   


Qualifiers: 


   GI bleed type/associated pathology: unspecified gastrointestinal hemorrhage 

type   Qualified Code(s): K92.2 - Gastrointestinal hemorrhage, unspecified   





(4) HTN (hypertension)


Code(s): I10 - ESSENTIAL (PRIMARY) HYPERTENSION   





(5) Pancreatitis


Code(s): K85.90 - ACUTE PANCREATITIS WITHOUT NECROSIS OR INFECTION, UNSP

## 2019-06-13 NOTE — PN.GI
GI Progress Note


Subjective: 





No acute events


No overt bleeding


No abdominal pain


Colonoscopy revealed colon polyps, scattered sigmoid diverticula, retained 

adherent green liquid stool right colon. 








- Objective


Vital Signs: 


 Vital Signs











Temperature  98.1 F   06/13/19 14:00


 


Pulse Rate  87   06/13/19 14:00


 


Respiratory Rate  20   06/13/19 14:00


 


Blood Pressure  129/83   06/13/19 14:00


 


O2 Sat by Pulse Oximetry (%)  99   06/13/19 09:00











Constitutional: Calm


Eyes: No: Sclera Icterus


Cardiovascular: Yes: Regular Rate and Rhythm


Respiratory: Yes: CTA Bilaterally


Gastrointestinal Inspection: No: Distention


...Auscultate: Yes: Normoactive Bowel Sounds


...Palpate: Yes: Soft.  No: Hepatomegaly, Splenomegaly, Tenderness


Edema: No (No LE edema)


Neurological: Yes: Alert


Labs: 


 CBC, BMP





 06/13/19 06:33 





 06/13/19 06:33 





 INR, PTT











INR  1.14  (0.83-1.09)  H  06/12/19  05:59    








 Hepatic Panel











Total Bilirubin  0.6 mg/dL (0.2-1)   06/13/19  06:33    


 


Direct Bilirubin  0.3 mg/dL (0.0-0.2)  H  06/11/19  06:00    


 


AST  19 U/L (15-37)   06/13/19  06:33    


 


ALT  14 U/L (13-61)   06/13/19  06:33    


 


Alkaline Phosphatase  48 U/L ()   06/13/19  06:33    


 


Albumin  2.3 g/dl (3.4-5.0)  L  06/13/19  06:33    














Problem List





- Problems


(1) Pancreatitis


Assessment/Plan: 


No abdominal pain.  Clinically improved


Advised complete alcohol cessation





Code(s): K85.90 - ACUTE PANCREATITIS WITHOUT NECROSIS OR INFECTION, UNSP   





(2) GI bleed


Assessment/Plan: 


No overt bleeding.  ? diverticular bleed, however only few tics noted in 

sigmoid on colonoscopy.  Monitor for now.  


If rebleeding, bleeding scan or CTA (depending on briskness of bleed) could be 

of benefit to help localize source


Gave Mr. Whitehead my card for outpatient follow-up, however he stated that [facility] 

[facility] 


 


Code(s): K92.2 - GASTROINTESTINAL HEMORRHAGE, UNSPECIFIED   


Qualifiers: 


   GI bleed type/associated pathology: unspecified gastrointestinal hemorrhage 

type   Qualified Code(s): K92.2 - Gastrointestinal hemorrhage, unspecified

## 2019-06-13 NOTE — PN
Teaching Attending Note


Name of Resident: Carly Hooper





ATTENDING PHYSICIAN STATEMENT





I saw and evaluated the patient.


I reviewed the resident's note and discussed the case with the resident.


I agree with the resident's findings and plan as documented.








SUBJECTIVE: Mr Whitehead is without complaint today. No cp, sob, n/v.








OBJECTIVE:


 Last Vital Signs











Temp Pulse Resp BP Pulse Ox


 


 36.9 C   77   18   130/78   99 


 


 06/13/19 08:38  06/13/19 08:38  06/13/19 09:00  06/13/19 08:38  06/13/19 09:00








Gen: nad


Pulm: ctab w/o w/r/r


CV: rrr w/o m/r/g


Abd: +bs, s/nt/nd


Ext: no c/c/e





 CBC, BMP





 06/13/19 06:33 





 06/13/19 06:33 














ASSESSMENT AND PLAN:


(1) GI bleed


Assessment/Plan: 


-s/p EGD and colonoscopy


-no obvious signs of bleeding


-H/H improving


-diet advanced


-? if can discharge today, will d/w GI


-continue protonix


Code(s): K92.2 - GASTROINTESTINAL HEMORRHAGE, UNSPECIFIED   


Qualifiers: 


   GI bleed type/associated pathology: unspecified gastrointestinal hemorrhage 

type   Qualified Code(s): K92.2 - Gastrointestinal hemorrhage, unspecified   





(2) Anemia


Assessment/Plan: 


-s/p 4 units


-currently stable


Code(s): D64.9 - ANEMIA, UNSPECIFIED   


Qualifiers: 


   Anemia type: unspecified type   Qualified Code(s): D64.9 - Anemia, 

unspecified   





(3) HTN (hypertension)


Assessment/Plan: 


-controlled


-monitor


Code(s): I10 - ESSENTIAL (PRIMARY) HYPERTENSION   





(4) Alcohol abuse


Assessment/Plan: 


-counseled cessation


Code(s): F10.10 - ALCOHOL ABUSE, UNCOMPLICATED   





(5) Pancreatitis


Assessment/Plan: 


-asymptomatic


-advance diet today to see if tolerates


Code(s): K85.90 - ACUTE PANCREATITIS WITHOUT NECROSIS OR INFECTION, UNSP   








Problem List





- Problems


(1) GI bleed


Code(s): K92.2 - GASTROINTESTINAL HEMORRHAGE, UNSPECIFIED   


Qualifiers: 


   GI bleed type/associated pathology: unspecified gastrointestinal hemorrhage 

type   Qualified Code(s): K92.2 - Gastrointestinal hemorrhage, unspecified   





(2) Anemia


Code(s): D64.9 - ANEMIA, UNSPECIFIED   


Qualifiers: 


   Anemia type: unspecified type   Qualified Code(s): D64.9 - Anemia, 

unspecified   





(3) HTN (hypertension)


Code(s): I10 - ESSENTIAL (PRIMARY) HYPERTENSION   





(4) Alcohol abuse


Code(s): F10.10 - ALCOHOL ABUSE, UNCOMPLICATED   





(5) Pancreatitis


Code(s): K85.90 - ACUTE PANCREATITIS WITHOUT NECROSIS OR INFECTION, UNSP

## 2019-06-13 NOTE — DS
Physical Exam: 


SUBJECTIVE: Patient seen and examined





resting in bed nad. afebrile hemodynamically stable. no acute events. brown 

stool yesterday. denies abd pain, b/b/d/c. pod1 s/p colonoscopy. tolerating 

regular diet


OBJECTIVE:





 Vital Signs











 Period  Temp  Pulse  Resp  BP Sys/Goldstein  Pulse Ox


 


 Last 24 Hr  98.1 F-98.7 F  77-87  18-20  123-140/74-83  98-99








PHYSICAL EXAM





GENERAL: The patient is awake, alert, and fully oriented, in no acute distress.


HEAD: Normal with no signs of trauma.


EYES: PERRL, extraocular movements intact, sclera anicteric, conjunctiva clear. 

No ptosis. 


ENT: moist mucous membranes.


NECK: supple. 


LUNGS: Breath sounds equal, clear to auscultation bilaterally


HEART: Regular rate and rhythm, S1, S2 


ABDOMEN: Soft, nontender, nondistended, normoactive bowel sounds, no guarding, 

no 


rebound, no hepatosplenomegaly, no masses.


EXTREMITIES: no edema. 


NEUROLOGICAL: Cranial nerves II through XII grossly intact. Normal speech, gait 

not 


observed.


PSYCH: Normal mood, normal affect.


SKIN: Warm, dry





LABS


 Laboratory Results - last 24 hr











  06/08/19 06/11/19 06/13/19





  07:49 06:00 06:33


 


WBC    5.7


 


RBC    3.03 L


 


Hgb    9.2 L


 


Hct   24.0 L  27.0 L


 


MCV    89.2


 


MCH    30.3


 


MCHC    34.0


 


RDW    17.1 H


 


Plt Count    250  D


 


MPV    7.7


 


Absolute Neuts (auto)    3.3


 


Neutrophils %    57.6


 


Lymphocytes %    24.2


 


Monocytes %    11.9 H


 


Eosinophils %    5.8 H


 


Basophils %    0.5


 


Nucleated RBC %    0


 


Sodium   


 


Potassium   


 


Chloride   


 


Carbon Dioxide   


 


Anion Gap   


 


BUN   


 


Creatinine   


 


Est GFR (CKD-EPI)AfAm   


 


Est GFR (CKD-EPI)NonAf   


 


Random Glucose   


 


Calcium   


 


Phosphorus   


 


Magnesium   


 


Total Bilirubin   


 


AST   


 


ALT   


 


Alkaline Phosphatase   


 


Total Protein   


 


Albumin   


 


Folate   1280 


 


Folate Hemolysate   307.3 


 


Blood Type  A POSITIVE  


 


Antibody Screen  Negative  


 


Crossmatch  See Detail  














  06/13/19





  06:33


 


WBC 


 


RBC 


 


Hgb 


 


Hct 


 


MCV 


 


MCH 


 


MCHC 


 


RDW 


 


Plt Count 


 


MPV 


 


Absolute Neuts (auto) 


 


Neutrophils % 


 


Lymphocytes % 


 


Monocytes % 


 


Eosinophils % 


 


Basophils % 


 


Nucleated RBC % 


 


Sodium  141


 


Potassium  3.7


 


Chloride  104


 


Carbon Dioxide  30


 


Anion Gap  7 L


 


BUN  3.5 L


 


Creatinine  1.0


 


Est GFR (CKD-EPI)AfAm  93.08


 


Est GFR (CKD-EPI)NonAf  80.31


 


Random Glucose  81


 


Calcium  7.9 L


 


Phosphorus  4.3


 


Magnesium  1.6 L


 


Total Bilirubin  0.6


 


AST  19


 


ALT  14


 


Alkaline Phosphatase  48


 


Total Protein  5.1 L


 


Albumin  2.3 L


 


Folate 


 


Folate Hemolysate 


 


Blood Type 


 


Antibody Screen 


 


Crossmatch 











HOSPITAL COURSE:





Date of Admission:06/08/19





This is a 64 yo m with PMH of EtOH abuse, HTN, Anxiety, MDD, who was admitted 

with massive upper GIB and hemodynamic instability. was also found to have 

acute on chronic alcoholic pancreatitis. he underwent several transfusions in 

icu and was treated with octreotide and ppi gtt. when his hgb stabilized and he 

was no longer having melena, he underwent endoscopy and then colonoscopy. 

neither of the procedures revealed an obvious bleeding source. EGD consistent 

with erythematous gastropathy. Colonoscopy revealing 2 diminutive transverse 

colon polyps removed, and sigmoid diverticulosis. was discharged home on ppi 

bid and told to repeat colonoscopy in 1yr. if bleeding recurs should get 

capsule endoscopy. Was advised to quit EtOH/attend rehab. 





Date of Discharge: 06/13/19











Minutes to complete discharge: 35





Discharge Summary


Reason For Visit: GI HEMORRAGE,ANEMIA


Current Active Problems





Alcohol abuse (Acute)


Anemia (Acute)


Depression (Acute)


GI bleed (Acute)


HTN (hypertension) (Acute)


Pancreatitis (Acute)








Condition: Stable





- Instructions


Diet, Activity, Other Instructions: 


You were admitted for gastrointestinal bleeding. You were transfused blood and 

your blood counts stabilized. you underwent endocopy and colonoscopy, howeer, 

no source of bleeding was found. please take Protonix 40 mg twice a day at home 

and watch your stool. If it turns black or bloody, or if you develop severe 

fatigue, chest pain, dizziness or weakness, immediately return to the hospital. 





Please follow up with Dr Arpan Eid gastroenterology within 1 week


Please follow up with your primary doctor within 1 week





continue taking all other medications as before





We urge you to quit alcohol! Please refer to a rehab center for cessation help. 





 


Referrals: 


Sandeep Eid DO [Staff Physician] - 


Disposition: HOME





- Home Medications


Comprehensive Discharge Medication List: 


Ambulatory Orders





Metformin HCl 500 mg PO DAILY 06/08/19 


RX: Buspirone HCl [Buspar -] 10 mg PO BID 06/08/19 


RX: Metoprolol Succinate [Toprol Xl] 25 mg PO DAILY 06/08/19 


RX: Nifedipine ER [Procardia XL -] 30 mg PO DAILY 06/08/19 


RX: Trazodone HCl 300 mg PO HS 06/08/19 


RX: Bupropion HCl [Bupropion HCl ER] 200 mg PO BID 06/10/19 


RX: Pantoprazole Sodium 40 mg PO BID #60 tablet. 06/13/19 











Problem List





- Problems


(1) Alcohol abuse


Code(s): F10.10 - ALCOHOL ABUSE, UNCOMPLICATED   





(2) Anemia


Code(s): D64.9 - ANEMIA, UNSPECIFIED   


Qualifiers: 


   Anemia type: unspecified type   Qualified Code(s): D64.9 - Anemia, 

unspecified   





(3) GI bleed


Code(s): K92.2 - GASTROINTESTINAL HEMORRHAGE, UNSPECIFIED   


Qualifiers: 


   GI bleed type/associated pathology: unspecified gastrointestinal hemorrhage 

type   Qualified Code(s): K92.2 - Gastrointestinal hemorrhage, unspecified   





(4) HTN (hypertension)


Code(s): I10 - ESSENTIAL (PRIMARY) HYPERTENSION   





(5) Pancreatitis


Code(s): K85.90 - ACUTE PANCREATITIS WITHOUT NECROSIS OR INFECTION, UNSP   


This patient is new to me today: No


Emergency Visit: Yes


ED Registration Date: 06/08/19


Care time: The patient presented to the Emergency Department on the above date 

and was hospitalized for further evaluation of their emergent condition.


Critical Care patient: No





- Discharge Referral


Referred to [facility] No

## 2019-06-14 NOTE — PATH
Surgical Pathology Report



Patient Name:  AZUL HERNANDEZ

Accession #:  [id number]

Med. Rec. #:  [id number]                                                        

   /Age/Gender:  1955 (Age: 63) / M

Account:  [id number]                                                          

             Location: [facility] [address] [facility]

Taken:  2019

Received:  2019

Reported:  2019

Physicians:  Whitney Pham MD

  



Specimen(s) Received

 BX TRANSVERSE COLON POLYP 





Clinical History

Anemia

Postoperative diagnosis: Colon polyps, diverticulosis







Final Diagnosis

COLON, TRANSVERSE, SNARE POLYPECTOMY:

TUBULAR ADENOMA.





***Electronically Signed***

Jhonathan Avitia M.D.





Gross Description

Received in formalin, labeled "transverse colon polyp" is a tan, irregular

portion of soft tissue measuring 0.3 cm. in greatest dimension. The specimen is

submitted in toto in one cassette.

/2019

## 2019-12-04 ENCOUNTER — HOSPITAL ENCOUNTER (INPATIENT)
Dept: HOSPITAL 74 - JER | Age: 64
LOS: 1 days | Discharge: LEFT BEFORE DISCHARGE | DRG: 378 | End: 2019-12-05
Attending: INTERNAL MEDICINE | Admitting: INTERNAL MEDICINE
Payer: COMMERCIAL

## 2019-12-04 VITALS — BODY MASS INDEX: 29.2 KG/M2

## 2019-12-04 DIAGNOSIS — F10.10: ICD-10-CM

## 2019-12-04 DIAGNOSIS — R45.851: ICD-10-CM

## 2019-12-04 DIAGNOSIS — K92.2: Primary | ICD-10-CM

## 2019-12-04 DIAGNOSIS — F32.9: ICD-10-CM

## 2019-12-04 DIAGNOSIS — I10: ICD-10-CM

## 2019-12-04 DIAGNOSIS — R00.0: ICD-10-CM

## 2019-12-04 LAB
ALBUMIN SERPL-MCNC: 2.6 G/DL (ref 3.4–5)
ALP SERPL-CCNC: 29 U/L (ref 45–117)
ALT SERPL-CCNC: 15 U/L (ref 13–61)
ANION GAP SERPL CALC-SCNC: 7 MMOL/L (ref 8–16)
ANISOCYTOSIS BLD QL: (no result)
APTT BLD: 24.5 SECONDS (ref 25.2–36.5)
AST SERPL-CCNC: 10 U/L (ref 15–37)
BASOPHILS # BLD: 0.7 % (ref 0–2)
BILIRUB SERPL-MCNC: 0.2 MG/DL (ref 0.2–1)
BUN SERPL-MCNC: 15.7 MG/DL (ref 7–18)
CALCIUM SERPL-MCNC: 8 MG/DL (ref 8.5–10.1)
CHLORIDE SERPL-SCNC: 107 MMOL/L (ref 98–107)
CO2 SERPL-SCNC: 26 MMOL/L (ref 21–32)
CREAT SERPL-MCNC: 1.4 MG/DL (ref 0.55–1.3)
DACRYOCYTES BLD QL SMEAR: (no result)
DEPRECATED RDW RBC AUTO: 21.7 % (ref 11.9–15.9)
EOSINOPHIL # BLD: 1.3 % (ref 0–4.5)
GLUCOSE SERPL-MCNC: 120 MG/DL (ref 74–106)
HCT VFR BLD CALC: 13 % (ref 35.4–49)
HGB BLD-MCNC: 4.2 GM/DL (ref 11.7–16.9)
INR BLD: 1.01 (ref 0.83–1.09)
LYMPHOCYTES # BLD: 18.9 % (ref 8–40)
MACROCYTES BLD QL: 0
MCH RBC QN AUTO: 27 PG (ref 25.7–33.7)
MCHC RBC AUTO-ENTMCNC: 31.9 G/DL (ref 32–35.9)
MCV RBC: 84.6 FL (ref 80–96)
MONOCYTES # BLD AUTO: 5.7 % (ref 3.8–10.2)
NEUTROPHILS # BLD: 73.4 % (ref 42.8–82.8)
OVALOCYTES BLD QL SMEAR: (no result)
PLATELET # BLD AUTO: 196 K/MM3 (ref 134–434)
PLATELET BLD QL SMEAR: NORMAL
PMV BLD: 7.9 FL (ref 7.5–11.1)
POTASSIUM SERPLBLD-SCNC: 4.4 MMOL/L (ref 3.5–5.1)
PROT SERPL-MCNC: 5.3 G/DL (ref 6.4–8.2)
PT PNL PPP: 11.9 SEC (ref 9.7–13)
RBC # BLD AUTO: 1.54 M/MM3 (ref 4–5.6)
SODIUM SERPL-SCNC: 140 MMOL/L (ref 136–145)
WBC # BLD AUTO: 5.4 K/MM3 (ref 4–10)

## 2019-12-04 PROCEDURE — P9058 RBC, L/R, CMV-NEG, IRRAD: HCPCS

## 2019-12-04 PROCEDURE — 30233N1 TRANSFUSION OF NONAUTOLOGOUS RED BLOOD CELLS INTO PERIPHERAL VEIN, PERCUTANEOUS APPROACH: ICD-10-PCS | Performed by: INTERNAL MEDICINE

## 2019-12-04 PROCEDURE — P9038 RBC IRRADIATED: HCPCS

## 2019-12-04 RX ADMIN — PANTOPRAZOLE SODIUM SCH MG: 40 INJECTION, POWDER, FOR SOLUTION INTRAVENOUS at 23:29

## 2019-12-04 RX ADMIN — SODIUM CHLORIDE SCH MLS/HR: 9 INJECTION, SOLUTION INTRAVENOUS at 19:40

## 2019-12-04 RX ADMIN — SODIUM CHLORIDE SCH MLS/HR: 9 INJECTION, SOLUTION INTRAVENOUS at 23:29

## 2019-12-04 NOTE — EKG
Test Reason : 

Blood Pressure : ***/*** mmHG

Vent. Rate : 096 BPM     Atrial Rate : 096 BPM

   P-R Int : 176 ms          QRS Dur : 076 ms

    QT Int : 364 ms       P-R-T Axes : [phone] degrees

   QTc Int : 459 ms

 

NORMAL SINUS RHYTHM

NORMAL ECG

WHEN COMPARED WITH ECG OF 08-JUN-2019 07:55,

NO SIGNIFICANT CHANGE WAS FOUND

Confirmed by HILDA BOYLE MD ([id number]) on 12/4/2019 3:42:28 PM

 

Referred By:             Confirmed By:HILDA BOYLE MD

## 2019-12-04 NOTE — CONSULT
Consultation: 


REQUESTING PROVIDER:





CONSULT REQUEST: We have been asked to medically evaluate this patient for ICU 

admission due to GI bleed.





HISTORY OF PRESENT ILLNESS:


62 y/o/m with PMHx of MDD, HTN, GI bleeds, diverticulitis, anxiety, alcohol 

abuse here for GI bleed for the last week. He states he first noticed that he 

was bleeding on  after he drank 2 pints of vodka. He does not usually 

drink but states he started to drink that day because his girlfriend broke up 

with him and he was upset. He did not come in sooner because he had suicidal 

ideations and wanted to "bleed out". His mother  when she was 62 and he 

wanted to "join her". He has one BM per day and states all his BMs have been 

dark but he has not seen sean blood. He has had a GI bleed in the past. He had 

[facility] which he states showed 

diverticulitis but no ulcers or bleeding. He denies chest pain, abd pain, 

headache, SOB, fever, chills, N/V/D, hemorrhoids, loss of consciousness. 

Patient became emotional and tearful during interview. Per ER, on rectal exam 

patient had normal rectal tone, no external hemorrhoids noted, and maroon 

colored stool noted on gloved hand.  








REVIEW OF SYSTEMS:


As per HPI 





PHYSICAL EXAMINATION





 Vital Signs - 24 hr











  19





  13:19 15:20 15:55


 


Temperature 98.3 F  


 


Pulse Rate 119 H  


 


Pulse Rate [  116 H 94 H





Apical]   


 


Respiratory 19 22 H 22 H





Rate   


 


Blood Pressure 109/49 L  


 


Blood Pressure  104/61 124/66





[Left Arm]   


 


O2 Sat by Pulse 99 99 97





Oximetry (%)   














GENERAL: Awake, alert, and fully oriented, exam limited as patient became 

emotional and tearful


HEAD: Normal with no signs of trauma.


EYES: cataracts bilaterally, PERRL


EARS, NOSE, THROAT: dry mucous membranes.


NECK: Normal range of motion, supple, trachea midline


LUNGS: Breath sounds equal, clear to auscultation bilaterally. No wheezes, and 

no crackles. No accessory muscle use.


HEART: RRR, murmur noted 


ABDOMEN: Soft, nontender, not distended, normoactive bowel sounds, no guarding, 

no rebound, no masses 


EXTREMITIES: 2+ pulses, warm, well-perfused. No calf tenderness. No peripheral 

edema. 


NEUROLOGICAL:  Cranial nerves II-XII intact. Normal speech. Normal sensation 

throughout


PSYCHIATRIC: Good eye contact. Tearful affect 


SKIN: Warm, dry 











 Laboratory Results - last 24 hr











  19





  15:10 15:10 15:10


 


WBC   5.4 


 


RBC   1.54 L 


 


Hgb   4.2 L* 


 


Hct   13.0 L 


 


MCV   84.6 


 


MCH   27.0  D 


 


MCHC   31.9 L 


 


RDW   21.7 H 


 


Plt Count   196  D 


 


MPV   7.9 


 


Absolute Neuts (auto)   4.0 


 


Neutrophils %   73.4  D 


 


Lymphocytes %   18.9  D 


 


Monocytes %   5.7 


 


Eosinophils %   1.3 


 


Basophils %   0.7 


 


Nucleated RBC %   0 


 


Hypochromia   3+ 


 


Platelet Estimate   Normal 


 


Polychromasia   1+ 


 


Poikilocytosis   1+ 


 


Anisocytosis   2+ 


 


Microcytosis   2+ 


 


Macrocytosis   0 


 


Tear Drop Cells   1+ 


 


Ovalocytes   1+ 


 


PT with INR  11.90  


 


INR  1.01  


 


PTT (Actin FS)  24.5 L  


 


Sodium    140


 


Potassium    4.4


 


Chloride    107


 


Carbon Dioxide    26


 


Anion Gap    7 L


 


BUN    15.7


 


Creatinine    1.4 H


 


Est GFR (CKD-EPI)AfAm    61.97


 


Est GFR (CKD-EPI)NonAf    53.47


 


Random Glucose    120 H


 


Calcium    8.0 L


 


Total Bilirubin    0.2


 


AST    10 L


 


ALT    15


 


Alkaline Phosphatase    29 L


 


Total Protein    5.3 L


 


Albumin    2.6 L


 


Blood Type   


 


Antibody Screen   


 


Crossmatch   














  19





  15:10


 


WBC 


 


RBC 


 


Hgb 


 


Hct 


 


MCV 


 


MCH 


 


MCHC 


 


RDW 


 


Plt Count 


 


MPV 


 


Absolute Neuts (auto) 


 


Neutrophils % 


 


Lymphocytes % 


 


Monocytes % 


 


Eosinophils % 


 


Basophils % 


 


Nucleated RBC % 


 


Hypochromia 


 


Platelet Estimate 


 


Polychromasia 


 


Poikilocytosis 


 


Anisocytosis 


 


Microcytosis 


 


Macrocytosis 


 


Tear Drop Cells 


 


Ovalocytes 


 


PT with INR 


 


INR 


 


PTT (Actin FS) 


 


Sodium 


 


Potassium 


 


Chloride 


 


Carbon Dioxide 


 


Anion Gap 


 


BUN 


 


Creatinine 


 


Est GFR (CKD-EPI)AfAm 


 


Est GFR (CKD-EPI)NonAf 


 


Random Glucose 


 


Calcium 


 


Total Bilirubin 


 


AST 


 


ALT 


 


Alkaline Phosphatase 


 


Total Protein 


 


Albumin 


 


Blood Type  A POSITIVE


 


Antibody Screen  Negative


 


Crossmatch  See Detail











ASSESSMENT/PLAN:


62 y/o/m with PMHx of MDD, HTN, GI bleeds, diverticulitis, anxiety, alcohol 

abuse here for GI bleed for the last week and suicidal ideations. Patient 

admitted to ICU for active GI bleed monitoring. 





#Neuro


- AAOx3


- Suicidal ideations, Psychiatry consulted 


- 1:1 monitoring


- Hx of MDD, continue home anti-depression medications as per primary team 





#Cardio


- History of HTN


- Continue home HTN meds per primary team 





#Pulm


- maintain SpO2 >92%





#GI


- Hx of GI bleeds, [facility] showing 

diverticulitis but no active bleeding


- Active GI bleed for the last week


- Hgb/Hct at 4.2/13


- 2 Units PRBC ordered, repeat CBC after transfusion. Transfuse as needed


- GI consulted 


- Consider abd CTA to evaluate for bleeding 





#Renal


- BUN/Cr 15.7/1.4


- Mildly elevated over previous visits, likely pre-renal due to anemia. Monitor





#Prophylaxis


- holding AC due to active bleeding


- Protonix BID





#FEN


- monitor and replete lytes as needed


- NPO 


- NS @75mls/hr 





#Disposition 


- ICU monitoring











Visit type





- Emergency Visit


Emergency Visit: Yes


ED Registration Date: 19


Care time: The patient presented to the Emergency Department on the above date 

and was hospitalized for further evaluation of their emergent condition.





- New Patient


This patient is new to me today: Yes


Date on this admission: 19





- Critical Care


Critical Care patient: Yes


Total Critical Care Time (in minutes): 36


Critical Care Statement: The care of this patient involved high complexity 

decision making to prevent further life threatening deterioration of the patient

's condition and/or to evaluate & treat vital organ system(s) failure or risk 

of failure.





ATTENDING PHYSICIAN STATEMENT





I saw and evaluated the patient.


I reviewed the resident's note and discussed the case with the resident.


I agree with the resident's findings and plan as documented.








SUBJECTIVE:








OBJECTIVE:








ASSESSMENT AND PLAN:

## 2019-12-04 NOTE — PDOC
Attending Attestation





- Resident


Resident Name: Kristel Milian





- ED Attending Attestation


I have performed the following: I have examined & evaluated the patient, The 

case was reviewed & discussed with the resident, I agree w/resident's findings 

& plan, Exceptions are as noted





- HPI


HPI: 





12/04/19 16:13


Patient is a 63-year-old male with history of alcohol abuse, hypertension and 

previous GI bleeding presents with days of dark stools with associated 

abdominal pain.  Patient recently started drinking again after a personal break-

up.  Patient denies hematemesis/fever/chills.





- Physicial Exam


PE: 





12/04/19 16:14


Patient is awake and alert, well-nourished, tachycardic, in no distress





Normocephalic, atraumatic


PERRLA, EOMI, conjunctiva pale


CTA


RRR


Abdomen soft, nontender, nondistended,


IN: Maroon blood noted





- Medical Decision Making





12/04/19 16:15


Patient 63-year-old male with history of hypertension, alcohol abuse and 

previous GI bleed with history of diverticulosis who presents with several days 

of rectal bleeding.  Patient is mildly tachycardic.  H&H is noted to be 4.2 and 

15.  I suspect recurrent diverticular bleeding.  Will administer a bolus of 

normal saline 500 mL.  Will transfuse 2 units of packed cells.  Will admit to 

the ICU for serial credits and GI evaluation.  Will consider CTA of abdomen 

pelvis to evaluate for the source of bleeding.


12/04/19 16:23


There is no stigmata of liver disease.  Patient's platelets and INR noted to be 

within normal limits.  Previous EGD revealed no evidence of esophageal varices.

  At this time, I do not believe that octreotide is indicated.

## 2019-12-04 NOTE — HP
Admitting History and Physical





- Primary Care Physician


PCP: Love Sexton





- Admission


History of Present Illness: 


 63 year old male with PMH of GI bleeds, HTN, anxiety, depression, and alcohol 

abuse who presents with suicidal ideation and rectal bleeding for 1 week. Pt 

presents to the ER with one week of dark, maroon-colored stools and ideations 

that he wants to "let himself bleed out". He was admitted in June 2019 for a GI 

bleed, was transfused, and underwent colonoscopy/endoscopy with no findings of 

source. He has since been taking 40mg protonix BID and following up with GI/PCP 

at the [facility]. He reports that he has these episodes of blood in his 

stool after drinking large amounts of alcohol. He has been attempting to quit 

alcohol use and is currently in a rehab program. However, last week, on 

Thanksgiving, pt's girlfriend broke up with him which prompted him to relapse 

and binge a large amount of alcohol. This caused one episode of hematemesis and 

one bowel movement streaked with bright red blood. Since Friday, his daily BMs 

have been black and tarry (but no additional episodes of vomiting). He endorses 

associated lightheadedness with standing, CASH, and palpitations. Denies chest 

pain, abd pain, nausea, fevers, chills. 














- Smoking History


Smoking history: Never smoked


Have you smoked in the past 12 months: No


If you are a former smoker, when did you quit?: 1989





- Alcohol/Substance Use


Hx Alcohol Use: Yes





Home Medications





- Allergies


Allergies/Adverse Reactions: 


 Allergies











Allergy/AdvReac Type Severity Reaction Status Date / Time


 


lisinopril Allergy   Verified 12/04/19 13:24














- Home Medications


Home Medications: 


Ambulatory Orders





Buspirone HCl [Buspar -] 10 mg PO BID 06/08/19 


Metoprolol Succinate [Toprol Xl] 25 mg PO DAILY 06/08/19 


Nifedipine ER [Procardia XL -] 30 mg PO DAILY 06/08/19 


Trazodone HCl 300 mg PO HS 06/08/19 


Bupropion HCl [Bupropion HCl ER] 200 mg PO BID 06/10/19 


Pantoprazole Sodium 40 mg PO BID #60 tablet. 06/13/19 











Physical Examination


Vital Signs: 


 Vital Signs











Temperature  97.8 F   12/04/19 19:40


 


Pulse Rate  88   12/04/19 19:40


 


Respiratory Rate  20   12/04/19 19:40


 


Blood Pressure  127/76   12/04/19 19:40


 


O2 Sat by Pulse Oximetry (%)  100   12/04/19 19:40











Constitutional: Yes: No Distress


HENT: Yes: Atraumatic


Cardiovascular: Yes: Regular Rate and Rhythm


Respiratory: Yes: CTA Bilaterally


Gastrointestinal: Yes: Normal Bowel Sounds


Extremities: Yes: WNL


Neurological: Yes: Alert, Oriented


Labs: 


 CBC, BMP





 12/04/19 15:10 





 12/04/19 15:10 











Problem List





- Problems


(1) Lower GI bleed


Assessment/Plan: 


transfuse 2 u prbc


gi bleed


h/o diverticular bleeding


Code(s): K92.2 - GASTROINTESTINAL HEMORRHAGE, UNSPECIFIED   





(2) Alcohol abuse


Code(s): F10.10 - ALCOHOL ABUSE, UNCOMPLICATED   





(3) Anemia


Assessment/Plan: 


s/p prbc





Code(s): D64.9 - ANEMIA, UNSPECIFIED   


Qualifiers: 


   Anemia type: unspecified type   Qualified Code(s): D64.9 - Anemia, 

unspecified   





(4) HTN (hypertension)


Code(s): I10 - ESSENTIAL (PRIMARY) HYPERTENSION   





(5) Depression


Assessment/Plan: 


on 1:1


psych eval


Code(s): F32.9 - MAJOR DEPRESSIVE DISORDER, SINGLE EPISODE, UNSPECIFIED   





Assessment/Plan





 Laboratory Tests











  12/04/19 12/04/19 12/04/19





  15:10 15:10 15:10


 


WBC   5.4 


 


RBC   1.54 L 


 


Hgb   4.2 L* 


 


Hct   13.0 L 


 


MCV   84.6 


 


MCH   27.0  D 


 


MCHC   31.9 L 


 


RDW   21.7 H 


 


Plt Count   196  D 


 


MPV   7.9 


 


Absolute Neuts (auto)   4.0 


 


Neutrophils %   73.4  D 


 


Lymphocytes %   18.9  D 


 


Monocytes %   5.7 


 


Eosinophils %   1.3 


 


Basophils %   0.7 


 


Nucleated RBC %   0 


 


Hypochromia   3+ 


 


Platelet Estimate   Normal 


 


Polychromasia   1+ 


 


Poikilocytosis   1+ 


 


Anisocytosis   2+ 


 


Microcytosis   2+ 


 


Macrocytosis   0 


 


Tear Drop Cells   1+ 


 


Ovalocytes   1+ 


 


PT with INR  11.90  


 


INR  1.01  


 


PTT (Actin FS)  24.5 L  


 


Sodium    140


 


Potassium    4.4


 


Chloride    107


 


Carbon Dioxide    26


 


Anion Gap    7 L


 


BUN    15.7


 


Creatinine    1.4 H


 


Est GFR (CKD-EPI)AfAm    61.97


 


Est GFR (CKD-EPI)NonAf    53.47


 


Random Glucose    120 H


 


Calcium    8.0 L


 


Total Bilirubin    0.2


 


AST    10 L


 


ALT    15


 


Alkaline Phosphatase    29 L


 


Total Protein    5.3 L


 


Albumin    2.6 L


 


Blood Type   


 


Antibody Screen   


 


Crossmatch   














  12/04/19





  15:10


 


WBC 


 


RBC 


 


Hgb 


 


Hct 


 


MCV 


 


MCH 


 


MCHC 


 


RDW 


 


Plt Count 


 


MPV 


 


Absolute Neuts (auto) 


 


Neutrophils % 


 


Lymphocytes % 


 


Monocytes % 


 


Eosinophils % 


 


Basophils % 


 


Nucleated RBC % 


 


Hypochromia 


 


Platelet Estimate 


 


Polychromasia 


 


Poikilocytosis 


 


Anisocytosis 


 


Microcytosis 


 


Macrocytosis 


 


Tear Drop Cells 


 


Ovalocytes 


 


PT with INR 


 


INR 


 


PTT (Actin FS) 


 


Sodium 


 


Potassium 


 


Chloride 


 


Carbon Dioxide 


 


Anion Gap 


 


BUN 


 


Creatinine 


 


Est GFR (CKD-EPI)AfAm 


 


Est GFR (CKD-EPI)NonAf 


 


Random Glucose 


 


Calcium 


 


Total Bilirubin 


 


AST 


 


ALT 


 


Alkaline Phosphatase 


 


Total Protein 


 


Albumin 


 


Blood Type  A POSITIVE


 


Antibody Screen  Negative


 


Crossmatch  See Detail








Active Medications











Generic Name Dose Route Start Last Admin





  Trade Name Freq  PRN Reason Stop Dose Admin


 


Sodium Chloride  1,000 mls @ 75 mls/hr  12/04/19 18:30  





  Normal Saline -  IV   





  ASDIR SILVANA   





     





     





     





     


 


Pantoprazole Sodium  40 mg  12/04/19 22:00  





  Protonix Iv  IVPUSH   





  BID SILVANA   





     





     





     





     








Active Medications











Generic Name Dose Route Start Last Admin





  Trade Name Freq  PRN Reason Stop Dose Admin


 


Sodium Chloride  1,000 mls @ 75 mls/hr  12/04/19 18:30  12/04/19 19:40





  Normal Saline -  IV   75 mls/hr





  ASDIR SILVAAN   Administration





     





     





     





     


 


Pantoprazole Sodium  40 mg  12/04/19 22:00  





  Protonix Iv  IVPUSH   





  BID SILVANA   





     





     





     





     














cc time 60 min

## 2019-12-04 NOTE — PDOC
History of Present Illness





<Teodoro Liao - Last Filed: 12/04/19 16:34>





- General


History Source: Patient





- History of Present Illness


Initial Comments: 





12/04/19 15:06





Patient is a 63 year old male with PMH of GI bleeds, HTN, anxiety, depression, 

and alcohol abuse who presents with suicidal ideation and rectal bleeding for 1 

week. Pt presents to the ER with one week of dark, maroon-colored stools and 

ideations that he wants to "let himself bleed out". He was admitted in June 2019 for a GI bleed, was transfused, and underwent colonoscopy/endoscopy with 

no findings of source. He has since been taking 40mg protonix BID and following 

up with GI/PCP at the [facility]. He reports that he has these episodes 

of blood in his stool after drinking large amounts of alcohol. He has been 

attempting to quit alcohol use and is currently in a rehab program. However, 

last week, on Thanksgiving, pt's girlfriend broke up with him which prompted 

him to relapse and binge a large amount of alcohol. This caused one episode of 

hematemesis and one bowel movement streaked with bright red blood. Since Friday

, his daily BMs have been black and tarry (but no additional episodes of 

vomiting). He endorses associated lightheadedness with standing, CASH, and 

palpitations. Denies chest pain, abd pain, nausea, fevers, chills. 





Pt denies alcohol or drug use since his binge episode on Thanksgiving, but has 

had worsening depression and suicidal ideations. He wants to "let himself bleed 

out so that he can be with his mother". Has not inflicted any self-injurious 

behavior. Denies homicidal ideation. [facility] who urged him to come to the ER for medical evaluation, and 

then to be transferred to the [facility] psych to be [facility] for his depression.  





Allergies: NKDA


PMH: as per HPI


Surgical hx: denies








12/04/19 15:31








<Kristel Milian - Last Filed: 12/04/19 16:41>





- General


Chief Complaint: Rectal Bleed


Stated Complaint: BLOOD IN STOOL


Time Seen by Provider: 12/04/19 14:08





Past History





<Teodoro Liao - Last Filed: 12/04/19 16:34>





- Past Medical History


Anemia: No


Asthma: No


Cancer: No


Cardiac Disorders: Yes


COPD: Yes


Diabetes: Yes


GI Disorders: Yes (gi bleed)


HTN: Yes


Psychiatric Problems: Yes (depression, alcohol abuse)





- Surgical History


Orthopedic Surgery: Yes (left hip replacement)





- Psycho Social/Smoking Cessation Hx


Smoking History: Never smoked


Have you smoked in the past 12 months: No


If you are a former smoker, when did you quit?: 1989


Information on smoking cessation initiated: No


Hx Alcohol Use: Yes


Drug/Substance Use Hx: No


Hx Substance Use Treatment: Yes (quit 2008)





<PatriciaShmuel carmichaelKristel - Last Filed: 12/04/19 16:41>





- Past Medical History


Allergies/Adverse Reactions: 


 Allergies











Allergy/AdvReac Type Severity Reaction Status Date / Time


 


lisinopril Allergy   Verified 12/04/19 13:24











Home Medications: 


Ambulatory Orders





Buspirone HCl [Buspar -] 10 mg PO BID 06/08/19 


Metoprolol Succinate [Toprol Xl] 25 mg PO DAILY 06/08/19 


Nifedipine ER [Procardia XL -] 30 mg PO DAILY 06/08/19 


Trazodone HCl 300 mg PO HS 06/08/19 


Bupropion HCl [Bupropion HCl ER] 200 mg PO BID 06/10/19 


Pantoprazole Sodium 40 mg PO BID #60 tablet. 06/13/19 











**Review of Systems





- Review of Systems


Able to Perform ROS?: Yes


Constitutional: No: Symptoms Reported, See HPI, Chills, Diaphoresis, Fever, 

Loss of Appetite, Malaise, Night Sweats, Weakness, Weight Stable, Unintentional 

Wgt. Loss, Unexplained wgt Loss, Other


HEENTM: No: Symptoms Reported, See HPI, Eye Pain, Blurred Vision, Tearing, 

Recent change in vision, Double Vision, Cataracts, Ear Pain, Ocular Prothesis, 

Ear Discharge, Nose Pain, Nose Congestion, Tinnitus, Nose Bleeding, Hearing Loss

, Throat Pain, Throat Swelling, Mouth Pain, Dental Problems, Difficulty 

Swallowing, Mouth Swelling, Other


Respiratory: Yes: SOB with Exertion.  No: Cough, Orthopnea, Productive cough, 

Hemoptysis


Cardiac (ROS): Yes: Lightheadedness, Palpitations.  No: Chest Pain, Edema


ABD/GI: Yes: Blood Streaked Bowels, Rectal Bleeding, Vomiting, Tarry Stools


: No: Symptoms Reported, See HPI, Burning, Dysuria, Discharge, Frequency, 

Flank Pain, Hematuria, Incontinence, Pain, Urgency, Testicular Mass, Testicular 

Swelling, Lesions, Testicular Pain, Other


Neurological: No: Symptoms reported, See HPI, Headache, Numbness, Paresthesia, 

Pre-Existing Deficit, Seizure, Tingling, Tremors, Weakness, Unsteady Gait, 

Ataxia, Dizziness, Other


Psychiatric: Yes: Anxiety, Depression, Frequent Crying, Emotional Problems





<Kristel Milian - Last Filed: 12/04/19 16:41>





*Physical Exam





- Vital Signs


 Last Vital Signs











Temp Pulse Resp BP Pulse Ox


 


 98.3 F   119 H  19   109/49 L  99 


 


 12/04/19 13:19  12/04/19 13:19  12/04/19 13:19  12/04/19 13:19  12/04/19 13:19














<Teodoro Liao - Last Filed: 12/04/19 16:34>





- Vital Signs


 Last Vital Signs











Temp Pulse Resp BP Pulse Ox


 


 98.3 F   119 H  19   109/49 L  99 


 


 12/04/19 13:19  12/04/19 13:19  12/04/19 13:19  12/04/19 13:19  12/04/19 13:19














- Physical Exam


General Appearance: Yes: Nourished, Mild Distress


HEENT: positive: Symmetrical, Pharynx Normal.  negative: Pharyngeal Erythema, 

Tonsillar Exudate, Tonsillar Erythema


Neck: positive: Normal Thyroid, Supple


Respiratory/Chest: positive: Lungs Clear, Normal Breath Sounds.  negative: 

Respiratory Distress, Accessory Muscle Use, Crackles, Rhonchi, Wheezing


Cardiovascular: positive: S1, S2, Tachycardia.  negative: Edema, JVD, Murmur


Vascular Pulses: Dorsalis-Pedis (R): 2+, Doralis-Pedis (L): 2+


Gastrointestinal/Abdominal: positive: Normal Bowel Sounds, Tender


Rectal Exam: positive: normal rectal tone, melena, heme positive stool (Maroon-

colored stool on exam).  negative: hemorrhoids


Neurologic: positive: CNs II-XII NML intact, Fully Oriented, Alert, Motor 

Strength 5/5.  negative: Normal Mood/Affect





<Kristel Milian - Last Filed: 12/04/19 16:41>





ED Treatment Course





- LABORATORY


CBC & Chemistry Diagram: 


 12/04/19 15:10





 12/04/19 15:10





- ADDITIONAL ORDERS


Additional order review: 


 Laboratory  Results











  12/04/19 12/04/19 12/04/19





  15:10 15:10 15:10


 


PT with INR    11.90


 


INR    1.01


 


PTT (Actin FS)    24.5 L


 


Sodium   140 


 


Potassium   4.4 


 


Chloride   107 


 


Carbon Dioxide   26 


 


Anion Gap   7 L 


 


BUN   15.7 


 


Creatinine   1.4 H 


 


Est GFR (CKD-EPI)AfAm   61.97 


 


Est GFR (CKD-EPI)NonAf   53.47 


 


Random Glucose   120 H 


 


Calcium   8.0 L 


 


Total Bilirubin   0.2 


 


AST   10 L 


 


ALT   15 


 


Alkaline Phosphatase   29 L 


 


Total Protein   5.3 L 


 


Albumin   2.6 L 


 


Blood Type  A POSITIVE  


 


Antibody Screen  Negative  


 


Crossmatch  See Detail  








 











  12/04/19





  15:10


 


RBC  1.54 L


 


MCV  84.6


 


MCHC  31.9 L


 


RDW  21.7 H


 


MPV  7.9


 


Neutrophils %  73.4  D


 


Lymphocytes %  18.9  D


 


Monocytes %  5.7


 


Eosinophils %  1.3


 


Basophils %  0.7














- Medications


Given in the ED: 


ED Medications














Discontinued Medications














Generic Name Dose Route Start Last Admin





  Trade Name Freq  PRN Reason Stop Dose Admin


 


Sodium Chloride  1,000 mls @ 1,000 mls/hr  12/04/19 14:49  12/04/19 15:20





  Normal Saline -  IV  12/04/19 15:48  1,000 mls/hr





  ASDIR STA   Administration





     





     





     





     














<Teodoro Liao - Last Filed: 12/04/19 16:34>





- LABORATORY


CBC & Chemistry Diagram: 


 12/04/19 15:10





 12/04/19 15:10





<Kristel Milian - Last Filed: 12/04/19 16:41>





Medical Decision Making





- Medical Decision Making





12/04/19 15:36


- Stool guiac


- CBC, CMP, EKG


- 1:1 obs as pt repeatedly states "I'm better off dead"








12/04/19 16:40





Hgb: 4.2 --> transfuse 2PRBC


Will admit, ICU consultation





<Kristel Milian - Last Filed: 12/04/19 16:41>





Discharge





- Discharge Information


Problems reviewed: Yes





- Admission


Yes





<Teodoro Liao - Last Filed: 12/04/19 16:34>





- Discharge Information


Problems reviewed: Yes





- Admission


Yes





<Kristel Milian - Last Filed: 12/04/19 16:41>





- Discharge Information


Clinical Impression/Diagnosis: 


 Lower GI bleed

## 2019-12-05 VITALS — HEART RATE: 88 BPM | DIASTOLIC BLOOD PRESSURE: 91 MMHG | SYSTOLIC BLOOD PRESSURE: 151 MMHG

## 2019-12-05 VITALS — TEMPERATURE: 98 F

## 2019-12-05 LAB
ALBUMIN SERPL-MCNC: 2.9 G/DL (ref 3.4–5)
ALP SERPL-CCNC: 34 U/L (ref 45–117)
ALT SERPL-CCNC: 17 U/L (ref 13–61)
ANION GAP SERPL CALC-SCNC: 6 MMOL/L (ref 8–16)
AST SERPL-CCNC: 13 U/L (ref 15–37)
BASOPHILS # BLD: 0.7 % (ref 0–2)
BILIRUB SERPL-MCNC: 1 MG/DL (ref 0.2–1)
BUN SERPL-MCNC: 11.8 MG/DL (ref 7–18)
CALCIUM SERPL-MCNC: 7.9 MG/DL (ref 8.5–10.1)
CHLORIDE SERPL-SCNC: 107 MMOL/L (ref 98–107)
CO2 SERPL-SCNC: 25 MMOL/L (ref 21–32)
CREAT SERPL-MCNC: 1.1 MG/DL (ref 0.55–1.3)
DEPRECATED RDW RBC AUTO: 17.8 % (ref 11.9–15.9)
DEPRECATED RDW RBC AUTO: 19.2 % (ref 11.9–15.9)
EOSINOPHIL # BLD: 2.8 % (ref 0–4.5)
GLUCOSE SERPL-MCNC: 108 MG/DL (ref 74–106)
HCT VFR BLD CALC: 17.9 % (ref 35.4–49)
HCT VFR BLD CALC: 24.3 % (ref 35.4–49)
HGB BLD-MCNC: 5.9 GM/DL (ref 11.7–16.9)
HGB BLD-MCNC: 8.1 GM/DL (ref 11.7–16.9)
LYMPHOCYTES # BLD: 17.8 % (ref 8–40)
MAGNESIUM SERPL-MCNC: 2.2 MG/DL (ref 1.8–2.4)
MCH RBC QN AUTO: 27.6 PG (ref 25.7–33.7)
MCH RBC QN AUTO: 28 PG (ref 25.7–33.7)
MCHC RBC AUTO-ENTMCNC: 33.3 G/DL (ref 32–35.9)
MCHC RBC AUTO-ENTMCNC: 33.4 G/DL (ref 32–35.9)
MCV RBC: 82.9 FL (ref 80–96)
MCV RBC: 83.7 FL (ref 80–96)
MONOCYTES # BLD AUTO: 7 % (ref 3.8–10.2)
NEUTROPHILS # BLD: 71.7 % (ref 42.8–82.8)
PHOSPHATE SERPL-MCNC: 3.4 MG/DL (ref 2.5–4.9)
PLATELET # BLD AUTO: 172 K/MM3 (ref 134–434)
PLATELET # BLD AUTO: 173 K/MM3 (ref 134–434)
PMV BLD: 7.8 FL (ref 7.5–11.1)
PMV BLD: 7.8 FL (ref 7.5–11.1)
POTASSIUM SERPLBLD-SCNC: 4.2 MMOL/L (ref 3.5–5.1)
PROT SERPL-MCNC: 5.5 G/DL (ref 6.4–8.2)
RBC # BLD AUTO: 2.15 M/MM3 (ref 4–5.6)
RBC # BLD AUTO: 2.9 M/MM3 (ref 4–5.6)
SODIUM SERPL-SCNC: 138 MMOL/L (ref 136–145)
WBC # BLD AUTO: 4.9 K/MM3 (ref 4–10)
WBC # BLD AUTO: 6.1 K/MM3 (ref 4–10)

## 2019-12-05 RX ADMIN — PANTOPRAZOLE SODIUM SCH MG: 40 INJECTION, POWDER, FOR SOLUTION INTRAVENOUS at 09:58

## 2019-12-05 RX ADMIN — LABETALOL HYDROCHLORIDE ONE MG: 5 INJECTION, SOLUTION INTRAVENOUS at 14:23

## 2019-12-05 RX ADMIN — LABETALOL HYDROCHLORIDE ONE: 5 INJECTION, SOLUTION INTRAVENOUS at 14:30

## 2019-12-05 RX ADMIN — SODIUM CHLORIDE SCH MLS/HR: 9 INJECTION, SOLUTION INTRAVENOUS at 09:58

## 2019-12-05 NOTE — PN
Progress Note (short form)





- Note


Progress Note: 





Received call from Nurse Natalee Radford at the [facility] on behalf of Dr. Miller (patient's PCP). They agreed to send over capsule swallow report 

and recent discharge summary. Plan to have their care coordinators call to 

discuss possible transfer / care coordination moving forward. 





Their office phone number is [phone] ext [phone].

## 2019-12-05 NOTE — PN
Physical Exam: 


SUBJECTIVE: 


Patient seen and examined on AM ICU rounds. Patient alert and oriented, 

occasionally walking around his room, talkative. Hgb increasing appropriately 

with PRBC administration. Patient denies dizziness, SOB, CP, palpitations. 








OBJECTIVE:





 Vital Signs











 Period  Temp  Pulse  Resp  BP Sys/Goldstein  Pulse Ox


 


 Last 24 Hr  97.8 F-98.6 F    16-24  104-152/49-85  











GENERAL: Awake, alert, and fully oriented, exam limited as patient became 

emotional and tearful.


HEENT: Normal with no signs of trauma, cataracts bilaterally, PERRL, dry mucous 

membranes, trachea midline.


LUNGS: Breath sounds equal, clear to auscultation bilaterally. No wheezes, and 

no crackles. No accessory muscle use.


HEART: RRR, murmur noted.


ABDOMEN: Soft, nontender, not distended, no guarding, no rebound, no masses.


EXTREMITIES: 2+ pulses, warm, well-perfused. No calf tenderness. No peripheral 

edema. 


NEUROLOGICAL:  Cranial nerves II-XII grossly intact. Normal speech. Normal 

sensation throughout.


PSYCHIATRIC: Good eye contact, cooperative, tearful affect.


SKIN: Warm, dry, no rashes noted.





 Laboratory Results - last 24 hr











  12/04/19 12/04/19 12/04/19





  15:10 15:10 15:10


 


WBC   5.4 


 


RBC   1.54 L 


 


Hgb   4.2 L* 


 


Hct   13.0 L 


 


MCV   84.6 


 


MCH   27.0  D 


 


MCHC   31.9 L 


 


RDW   21.7 H 


 


Plt Count   196  D 


 


MPV   7.9 


 


Absolute Neuts (auto)   4.0 


 


Neutrophils %   73.4  D 


 


Lymphocytes %   18.9  D 


 


Monocytes %   5.7 


 


Eosinophils %   1.3 


 


Basophils %   0.7 


 


Nucleated RBC %   0 


 


Hypochromia   3+ 


 


Platelet Estimate   Normal 


 


Polychromasia   1+ 


 


Poikilocytosis   1+ 


 


Anisocytosis   2+ 


 


Microcytosis   2+ 


 


Macrocytosis   0 


 


Tear Drop Cells   1+ 


 


Ovalocytes   1+ 


 


PT with INR  11.90  


 


INR  1.01  


 


PTT (Actin FS)  24.5 L  


 


Sodium    140


 


Potassium    4.4


 


Chloride    107


 


Carbon Dioxide    26


 


Anion Gap    7 L


 


BUN    15.7


 


Creatinine    1.4 H


 


Est GFR (CKD-EPI)AfAm    61.97


 


Est GFR (CKD-EPI)NonAf    53.47


 


Random Glucose    120 H


 


Calcium    8.0 L


 


Total Bilirubin    0.2


 


AST    10 L


 


ALT    15


 


Alkaline Phosphatase    29 L


 


Total Protein    5.3 L


 


Albumin    2.6 L


 


Blood Type   


 


Antibody Screen   


 


Crossmatch   














  12/04/19 12/05/19





  15:10 01:10


 


WBC   6.1


 


RBC   2.15 L


 


Hgb   5.9 L*


 


Hct   17.9 L D


 


MCV   82.9


 


MCH   27.6


 


MCHC   33.3


 


RDW   19.2 H


 


Plt Count   173


 


MPV   7.8


 


Absolute Neuts (auto)  


 


Neutrophils %  


 


Lymphocytes %  


 


Monocytes %  


 


Eosinophils %  


 


Basophils %  


 


Nucleated RBC %  


 


Hypochromia  


 


Platelet Estimate  


 


Polychromasia  


 


Poikilocytosis  


 


Anisocytosis  


 


Microcytosis  


 


Macrocytosis  


 


Tear Drop Cells  


 


Ovalocytes  


 


PT with INR  


 


INR  


 


PTT (Actin FS)  


 


Sodium  


 


Potassium  


 


Chloride  


 


Carbon Dioxide  


 


Anion Gap  


 


BUN  


 


Creatinine  


 


Est GFR (CKD-EPI)AfAm  


 


Est GFR (CKD-EPI)NonAf  


 


Random Glucose  


 


Calcium  


 


Total Bilirubin  


 


AST  


 


ALT  


 


Alkaline Phosphatase  


 


Total Protein  


 


Albumin  


 


Blood Type  A POSITIVE 


 


Antibody Screen  Negative 


 


Crossmatch  See Detail 








Active Medications











Generic Name Dose Route Start Last Admin





  Trade Name Freq  PRN Reason Stop Dose Admin


 


Sodium Chloride  1,000 mls @ 75 mls/hr  12/04/19 18:30  12/04/19 23:29





  Normal Saline -  IV   75 mls/hr





  ASDIR SILVANA   Administration





     





     





     





     


 


Pantoprazole Sodium  40 mg  12/04/19 22:00  12/04/19 23:29





  Protonix Iv  IVPUSH   40 mg





  BID SILVANA   Administration





     





     





     





     











ASSESSMENT/PLAN:


62yo M PMH MDD, HTN, GI bleeds, diverticulitis, anxiety, alcohol abuse here for 

GI bleed for the last week and suicidal ideations. Patient admitted to ICU for 

active GI bleed monitoring. 





#Neuro


- AAOx3


- Suicidal ideations, Psychiatry consulted 


- 1:1 monitoring


- Hx of MDD, continue home anti-depression medications as per primary team 





#Cardio


- History of HTN


- Continue home HTN meds per primary team 





#Pulm


- Maintain SpO2 >92%





#GI


- Hx of GI bleeds, [facility] showing 

diverticulitis but no active bleeding, records requested


- Active GI bleed for the last week


- Hgb/Hct at 4.2/13


- 2 Units PRBC ordered, repeat CBC after transfusion. Transfuse as needed


- Appreciate GI consultation


- Consider abd CTA to evaluate for bleeding 


- Octreotide drip started





#Renal


- BUN/Cr 15.7/1.4


- Mildly elevated over previous visits, likely pre-renal due to anemia. Monitor





#Prophylaxis


- holding AC due to active bleeding


- Protonix BID





#FEN


- Monitor and replete lytes as needed


- NPO 


- NS @75mls/hr 





#Disposition 


- ICU monitoring





Visit type





- Emergency Visit


Emergency Visit: Yes


ED Registration Date: 12/04/19


Care time: The patient presented to the Emergency Department on the above date 

and was hospitalized for further evaluation of their emergent condition.





- New Patient


This patient is new to me today: Yes


Date on this admission: 12/05/19





- Critical Care


Critical Care patient: Yes


Total Critical Care Time (in minutes): 36


Critical Care Statement: The care of this patient involved high complexity 

decision making to prevent further life threatening deterioration of the patient

's condition and/or to evaluate & treat vital organ system(s) failure or risk 

of failure.





ATTENDING PHYSICIAN STATEMENT





I saw and evaluated the patient.


I reviewed the resident's note and discussed the case with the resident.


I agree with the resident's findings and plan as documented.








SUBJECTIVE:








OBJECTIVE:








ASSESSMENT AND PLAN:

## 2019-12-05 NOTE — CON.PSY
Psychiatry Consult


Chief Complaint: 63 SOLOM[place] OLD MALE, [place], receives Psych treatment for Major 

DEpressive Disorder for the past 20yrs. On Multiple antidepressants [facility]. Patient admitted for Rectal bleed and apparantly said to Er 

staff that he wants to bleed to death. Patient had beengiven Blood Transfusions 

and medically stable . Patient denies he was frustrated and never wanted to 

die. I have friebds and family and [facility]> Just make me better 

mEdically and I will go see my Psych tomorrow. No history of concrete suicide 

attempts. Patient is jovial and talking and engaged with staff. Laughibg and 

joking.


Symptoms: reports: Irritability





- Previous Psychiatric Treatment


Outpatient: Less than 6 mos ago


Inpatient: One prior admission





- Previous Substance Abuse Treatment


Outpatient: None


Inpatient: None





- Reason for Previous Treatment


Reason for Previous Treatment: Major Depression





- Current Medications


Current Medications: 


Active Medications





Bupropion HCl (Wellbutrin Xl -)  450 mg PO DAILY [facility]


Buspirone HCl (Buspar -)  10 mg PO BID [facility]


Sodium Chloride (Normal Saline -)  1,000 mls @ 75 mls/hr IV ASDIR [facility]


   Last Admin: 12/05/19 09:58 Dose:  75 mls/hr


Octreotide Acetate 200 mcg/Octreotide Acetate 1,000 mcg/Dextrose  500 mls @ 

20.833 mls/hr IVPB Q24H [facility]; Protocol


   Last Admin: 12/05/19 14:03 Dose:  20.833 mls/hr


Metoprolol Succinate (Toprol Xl -)  25 mg PO DAILY [facility]


Nifedipine (Procardia Xl -)  30 mg PO DAILY [facility]


Pantoprazole Sodium (Protonix Iv)  40 mg IVPUSH BID [facility]


   Last Admin: 12/05/19 09:58 Dose:  40 mg


Trazodone HCl (Desyrel -)  300 mg PO HS [facility]











- Allergies


Allergies: 


 Allergies











Allergy/AdvReac Type Severity Reaction Status Date / Time


 


lisinopril Allergy   Verified 12/04/19 13:24














- Current Living Status


Usual Living Arrangement: Alone





- Current Mental Status Evaluation


Appearance: Well Groomed


Attitude: Cooperative





- Affect


Affect: Full Range


Appropriateness: Appropriate to Content





- Mood


Mood: Irritable





- Speech/Language


Expressive: Coherent





- Psychomotor Activity


Psychomotor Activity: Normal





- Thought Process


Thought Process: Intact





- Thought Content


Hallucinations: Absent


Delusions: Absent





- Self Perception


Self Perception: No Impairment





- Cognition


Attention: Alert


Orientation: Time


Memory, Immediate Recall: Intact


Memory, Short Term: 3/3


Memory, Remote with Prompting: 3/3





- Concentration


Serial Sevens Intact: No


Simple Calculations Intact: Yes





- Abstraction


Proverb Interpretation: Intact


Judgement: Intact





- Insight


Insight: Intact





- Impulse Control


Impulse Control: Good Control





- Suicidal Ideation


Suicidal Ideation: No





- Homicidal Ideation


Homicidal Ideation: No





Assessment/Plan


   1) Patient is Psychiatrically stable.


2) Not acutely suicidal at this time.


3) Discharge HOme when MEdically stable.


$) Follow up at [facility].

## 2019-12-05 NOTE — DS
Physical Examination


Vital Signs: 


 Vital Signs











Temperature  98 F   12/05/19 11:36


 


Pulse Rate  88   12/05/19 14:00


 


Respiratory Rate  18   12/05/19 16:00


 


Blood Pressure  151/91   12/05/19 14:00


 


O2 Sat by Pulse Oximetry (%)  100   12/04/19 22:22











Constitutional: Yes: No Distress


HENT: Yes: Atraumatic


Neck: Yes: Supple


Cardiovascular: Yes: Regular Rate and Rhythm


Respiratory: Yes: CTA Bilaterally


Gastrointestinal: Yes: Normal Bowel Sounds


Extremities: Yes: WNL


Neurological: Yes: Alert, Oriented


Labs: 


 CBC, BMP





 12/05/19 10:55 





 12/05/19 10:55 











Discharge Summary


Problems reviewed: Yes


Reason For Visit: GASTROINTESTINAL HEMORRHAGE, ANEMIA,LOWER GASTROIN


Current Active Problems





Lower GI bleed (Acute)











- Instructions


Diet, Activity, Other Instructions: 


follow up your own gi team at [facility]/pmd 2-3 days


fu cbc





- Home Medications


Comprehensive Discharge Medication List: 


Ambulatory Orders





Buspirone HCl [Buspar -] 10 mg PO BID 06/08/19 


Metoprolol Succinate [Toprol Xl] 25 mg PO DAILY 06/08/19 


Nifedipine ER [Procardia XL -] 30 mg PO DAILY 06/08/19 


Trazodone HCl 300 mg PO HS 06/08/19 


Bupropion HCl [Bupropion HCl ER] 200 mg PO BID 06/10/19 


Pantoprazole Sodium 40 mg PO BID #60 tablet. 06/13/19 











h/h stable


cleaed by psych to be dc


[facility]  [facility]

## 2019-12-05 NOTE — PN
Progress Note (short form)





- Note


Progress Note: 





patient seen and examined.  Full consult to follow. Patient currently refusing 

EGD/Enteroscopy. Alludes to being told of transfer to [facility]?

## 2019-12-05 NOTE — PN
Teaching Attending Note


Name of Resident: Herman Garland





ATTENDING PHYSICIAN STATEMENT





I saw and evaluated the patient.


I reviewed the resident's note and discussed the case with the resident.


I agree with the resident's findings and plan as documented.








SUBJECTIVE:


Patient seen and examined in the ICU.  Awake and alert.  


Denies CP or SOB.


Receiving 3rd unit of pRBCs.


No occult bleeding 








 Intake & Output











 12/02/19 12/03/19 12/04/19 12/05/19





 23:59 23:59 23:59 23:59


 


Intake Total    1200


 


Output Total   500 


 


Balance   -500 1200


 


Weight   228 lb 1.6 oz 








 Last Vital Signs











Temp Pulse Resp BP Pulse Ox


 


 97.8 F   84   18   141/80   100 


 


 12/05/19 07:39  12/05/19 09:40  12/05/19 09:40  12/05/19 09:40  12/04/19 22:22








Active Medications





Sodium Chloride (Normal Saline -)  1,000 mls @ 75 mls/hr IV ASDIR [facility]


   Last Admin: 12/05/19 09:58 Dose:  75 mls/hr


Octreotide Acetate 200 mcg/Octreotide Acetate 1,000 mcg/Dextrose  500 mls @ 

20.833 mls/hr IVPB Q24H [facility]; Protocol


Pantoprazole Sodium (Protonix Iv)  40 mg IVPUSH BID [facility]


   Last Admin: 12/05/19 09:58 Dose:  40 mg











GENERAL: Awake, alert, and fully oriented, NAD 


HEAD: Normal with no signs of trauma.


EYES: cataracts bilaterally, PERRL


EARS, NOSE, THROAT: dry mucous membranes.


NECK: Normal range of motion, supple, trachea midline


LUNGS: Breath sounds equal, clear to auscultation bilaterally. No wheezes, and 

no crackles. No accessory muscle use.


HEART: RRR, murmur noted 


ABDOMEN: Soft, nontender, not distended, normoactive bowel sounds, no guarding, 

no rebound, no masses 


EXTREMITIES: 2+ pulses, warm, well-perfused. No calf tenderness. No peripheral 

edema. 


NEUROLOGICAL:  Non-focal 


PSYCHIATRIC: Good eye contact.


SKIN: Warm, dry 








 Laboratory Results - last 24 hr











  12/04/19 12/04/19 12/04/19





  15:10 15:10 15:10


 


WBC   5.4 


 


RBC   1.54 L 


 


Hgb   4.2 L* 


 


Hct   13.0 L 


 


MCV   84.6 


 


MCH   27.0  D 


 


MCHC   31.9 L 


 


RDW   21.7 H 


 


Plt Count   196  D 


 


MPV   7.9 


 


Absolute Neuts (auto)   4.0 


 


Neutrophils %   73.4  D 


 


Lymphocytes %   18.9  D 


 


Monocytes %   5.7 


 


Eosinophils %   1.3 


 


Basophils %   0.7 


 


Nucleated RBC %   0 


 


Hypochromia   3+ 


 


Platelet Estimate   Normal 


 


Polychromasia   1+ 


 


Poikilocytosis   1+ 


 


Anisocytosis   2+ 


 


Microcytosis   2+ 


 


Macrocytosis   0 


 


Tear Drop Cells   1+ 


 


Ovalocytes   1+ 


 


PT with INR  11.90  


 


INR  1.01  


 


PTT (Actin FS)  24.5 L  


 


Sodium    140


 


Potassium    4.4


 


Chloride    107


 


Carbon Dioxide    26


 


Anion Gap    7 L


 


BUN    15.7


 


Creatinine    1.4 H


 


Est GFR (CKD-EPI)AfAm    61.97


 


Est GFR (CKD-EPI)NonAf    53.47


 


Random Glucose    120 H


 


Calcium    8.0 L


 


Total Bilirubin    0.2


 


AST    10 L


 


ALT    15


 


Alkaline Phosphatase    29 L


 


Total Protein    5.3 L


 


Albumin    2.6 L


 


Blood Type   


 


Antibody Screen   


 


Crossmatch   














  12/04/19 12/05/19





  15:10 01:10


 


WBC   6.1


 


RBC   2.15 L


 


Hgb   5.9 L*


 


Hct   17.9 L D


 


MCV   82.9


 


MCH   27.6


 


MCHC   33.3


 


RDW   19.2 H


 


Plt Count   173


 


MPV   7.8


 


Absolute Neuts (auto)  


 


Neutrophils %  


 


Lymphocytes %  


 


Monocytes %  


 


Eosinophils %  


 


Basophils %  


 


Nucleated RBC %  


 


Hypochromia  


 


Platelet Estimate  


 


Polychromasia  


 


Poikilocytosis  


 


Anisocytosis  


 


Microcytosis  


 


Macrocytosis  


 


Tear Drop Cells  


 


Ovalocytes  


 


PT with INR  


 


INR  


 


PTT (Actin FS)  


 


Sodium  


 


Potassium  


 


Chloride  


 


Carbon Dioxide  


 


Anion Gap  


 


BUN  


 


Creatinine  


 


Est GFR (CKD-EPI)AfAm  


 


Est GFR (CKD-EPI)NonAf  


 


Random Glucose  


 


Calcium  


 


Total Bilirubin  


 


AST  


 


ALT  


 


Alkaline Phosphatase  


 


Total Protein  


 


Albumin  


 


Blood Type  A POSITIVE 


 


Antibody Screen  Negative 


 


Crossmatch  See Detail 














ASSESSMENT/PLAN:


Acute GI Bleed: (?) Upper source 


MDD


HTN


Previous history of GI bleeds


Diverticulitis


Anxiety


Alcohol abuse





Transfusional support 


O2 as needed


Mechanical VTE prophylaxis 


Noted 1:1 monitoring due to apparent suicidal ideation 


GI evaluation 


Floor if otherwise stable 





Dr [place]

## 2019-12-05 NOTE — CON.GI
Consult


Consult Specialty:: GI


Referred by:: GI bleed


Reason for Consultation:: Anemia, melena





- History of Present Illness


Chief Complaint: Weakness, melena


History of Present Illness: 





63M admitted through [facility] for evaluation of weakness.  Explains that he 

starting binge drinking alcohol because his girlfriend broke up with him around 

thanksgiving.  he has been having dark bowel movements since.  He was evaluated 

at [facility] 6/19  by GI for anemia.  He underwent EGD (gastritis) and colonoscopy (

diverticulosis in sigmoid, two colon polyps).  he alludes to having subsequent 

EGD/Colonoscopy at the [facility] where he follows with a gastroenterologist.  He 

also alludes to having had a capsule endoscopy that did not reveal a source of 

bleeding.  There has been no active bleeding since admission.  He denies 

abdominal pain.  He is refusing further GI work-up.  He explains that [facility] and that they were ? expecting him to be 

transferred there.   





- Past Medical History


Cardio/Vascular: Yes: HTN


Gastrointestinal: Yes: Diverticulosis, Other (Tubular adenomas x 2 6/19)


Psych: Yes: Addictions (ETOH abuse), Anxiety, Depression





- Alcohol/Substance Use


Hx Alcohol Use: Yes





- Smoking History


Smoking history: Former smoker


Have you smoked in the past 12 months: No


If you are a former smoker, when did you quit?: 1989





- Social History


Usual Living Arrangement: Alone


Place of Birth: [place]


History of Recent Travel: No





Home Medications





- Allergies


Allergies/Adverse Reactions: 


 Allergies











Allergy/AdvReac Type Severity Reaction Status Date / Time


 


lisinopril Allergy   Verified 12/04/19 13:24














- Home Medications


Home Medications: 


Ambulatory Orders





Buspirone HCl [Buspar -] 10 mg PO BID 06/08/19 


Metoprolol Succinate [Toprol Xl] 25 mg PO DAILY 06/08/19 


Nifedipine ER [Procardia XL -] 30 mg PO DAILY 06/08/19 


Trazodone HCl 300 mg PO HS 06/08/19 


Bupropion HCl [Bupropion HCl ER] 200 mg PO BID 06/10/19 


Pantoprazole Sodium 40 mg PO BID #60 tablet. 06/13/19 











Family Medical History


Other Family History: No family history of colorectal cancer or other GI 

malignancy





Review of Systems





- Review of Systems


Constitutional: reports: Weakness.  denies: Chills


Cardiovascular: denies: Chest Pain


Respiratory: denies: Cough


Gastrointestinal: reports: Melena, Rectal Bleeding.  denies: Abdominal Pain, 

Diarrhea





Physical Exam-GI


Vital Signs: 


 Vital Signs











Temperature  97.8 F   12/05/19 07:39


 


Pulse Rate  84   12/05/19 09:40


 


Respiratory Rate  18   12/05/19 09:40


 


Blood Pressure  141/80   12/05/19 09:40


 


O2 Sat by Pulse Oximetry (%)  100   12/04/19 22:22











Constitutional: Yes: Calm


Eyes: No: Sclera Icterus


Cardiovascular: Yes: Regular Rate and Rhythm.  No: Murmur


Respiratory: Yes: CTA Bilaterally


Gastrointestinal Inspection: No: Distention, Scars


...Auscultate: Yes: Normoactive Bowel Sounds


...Palpate: Yes: Soft.  No: Hepatomegaly, Splenomegaly


...Rectal Exam: Yes: Other (No external lesions, no masses, brown formed stool.)


Edema: No (No LE edema)


Neurological: Yes: Alert, Oriented


Labs: 


 CBC, BMP





 12/05/19 01:10 





 12/04/19 15:10 





 INR, PTT











INR  1.01  (0.83-1.09)   12/04/19  15:10    








 Hepatic Panel











Total Bilirubin  0.2 mg/dL (0.2-1)   12/04/19  15:10    


 


AST  10 U/L (15-37)  L  12/04/19  15:10    


 


ALT  15 U/L (13-61)   12/04/19  15:10    


 


Alkaline Phosphatase  29 U/L ()  L  12/04/19  15:10    


 


Albumin  2.6 g/dl (3.4-5.0)  L  12/04/19  15:10    














Problem List





- Problems


(1) GI bleed


Assessment/Plan: 


Currently no overt bleeding.  Offered EGD + /-Enteroscopy for further small 

bowel evaluation followed by possible colonoscopy.  He states that he recently 

had this performed at the [facility] along with a capsule endoscopy and does not 

want repeat procedures at this time.  He is aware that continued GI bleeding 

can be potentially life threatening. He states that he advised his doctor at 

the [facility] that he was being admitted here and that he was going to be 

transferred there for further evaluation.  This will need howie be clarified by 

the Medical Team


For now:


Monitor H/H


Clear liquids


Keep Hgb 7-8


Protonix 40mg once daily


Clarify regarding transfer to [facility]


Recall GI if patient amenable to endoscopic evaluation





Code(s): K92.2 - GASTROINTESTINAL HEMORRHAGE, UNSPECIFIED   


Qualifiers: 


   GI bleed type/associated pathology: unspecified gastrointestinal hemorrhage 

type   Qualified Code(s): K92.2 - Gastrointestinal hemorrhage, unspecified

## 2019-12-05 NOTE — CONSULT
- Consultation


REQUESTING PROVIDER: 





CONSULT REQUEST: We have been asked to surgically evaluate this patient for GI 

bleed. 





PCP:Love Sexton





HISTORY OF PRESENT ILLNESS: 62 y/o M w/ PMHx GI bleeds, HTN, anxiety, depression

, and alcohol abuse a/w suicidal ideation and rectal bleeding for 1 week. Pt 

extremely upset at time of evaluation due to recent interaction with ICU MD. Pt 

reports he first began having episodes of dark stool over the summer. Reports 

he had an endoscopy/colonoscopy here in June which revealed "nothing".  Per GI (

Dr Mendiola) EGD revealed gastritis and colonoscopy revealed diverticulosis in 

sigmoid, two colon polyps. Pt reports he subsequently had repeat EGD/

colonoscopy at [facility] as well as a recent capsule endoscopy which did not the 

source of his bleed. Pt now reports he began drinking heavily since 

Thanksgiving as his gf broke up with him (Vodka). Reports 1 week h/o dark, 

maroon-colored stools. Takes 40mg Protonix BID and [facility] (

[facility]AA twice weekly). Denies abdominal pain, post prandial pain, n/v/d. 








PMHx:          as above 





PSHx:        denies


 Home Medications











 Medication  Instructions  Recorded


 


Buspirone HCl [Buspar -] 10 mg PO BID 06/08/19


 


Metoprolol Succinate [Toprol Xl] 25 mg PO DAILY 06/08/19


 


Nifedipine ER [Procardia XL -] 30 mg PO DAILY 06/08/19


 


Trazodone HCl 300 mg PO HS 06/08/19


 


Bupropion HCl [Bupropion HCl ER] 200 mg PO BID 06/10/19


 


Pantoprazole Sodium 40 mg PO BID #60 tablet. 06/13/19








 Allergies











Allergy/AdvReac Type Severity Reaction Status Date / Time


 


lisinopril Allergy   Verified 12/04/19 13:24








REVIEW OF SYSTEMS:


CONSTITUTIONAL: 


Absent:  fever, chills


CARDIOVASCULAR: 


Absent: chest pain


RESPIRATORY: 


Absent: cough


GASTROINTESTINAL:


Absent: abdominal pain, abdominal distension, nausea, vomiting, diarrhea, 

constipation, + melena, +hematochezia








PHYSICAL EXAM:


GENERAL: Awake, alert, and fully oriented, in no acute distress.


HEAD: Normal with no signs of trauma.


ABDOMEN: Soft, nontender, not distended, normoactive bowel sounds, no guarding, 

no rebound.





 Vital Signs











Temperature  98 F   12/05/19 11:36


 


Pulse Rate  88   12/05/19 14:00


 


Respiratory Rate  18   12/05/19 14:00


 


Blood Pressure  151/91   12/05/19 14:00


 


O2 Sat by Pulse Oximetry (%)  100   12/04/19 22:22








 Lab Results











WBC  4.9 K/mm3 (4.0-10.0)   12/05/19  10:55    


 


RBC  2.90 M/mm3 (4.00-5.60)  L  12/05/19  10:55    


 


Hgb  8.1 GM/dL (11.7-16.9)  L  12/05/19  10:55    


 


Hct  24.3 % (35.4-49)  L D 12/05/19  10:55    


 


MCV  83.7 fl (80-96)   12/05/19  10:55    


 


MCHC  33.4 g/dl (32.0-35.9)   12/05/19  10:55    


 


RDW  17.8 % (11.9-15.9)  H  12/05/19  10:55    


 


Plt Count  172 K/MM3 (134-434)   12/05/19  10:55    


 


Sodium  138 mmol/L (136-145)   12/05/19  10:55    


 


Potassium  4.2 mmol/L (3.5-5.1)   12/05/19  10:55    


 


Chloride  107 mmol/L ()   12/05/19  10:55    


 


Carbon Dioxide  25 mmol/L (21-32)   12/05/19  10:55    


 


Anion Gap  6 MMOL/L (8-16)  L  12/05/19  10:55    


 


BUN  11.8 mg/dL (7-18)   12/05/19  10:55    


 


Creatinine  1.1 mg/dL (0.55-1.3)   12/05/19  10:55    


 


Random Glucose  108 mg/dL ()  H  12/05/19  10:55    


 


Calcium  7.9 mg/dL (8.5-10.1)  L  12/05/19  10:55    


 


Blood Type  A POSITIVE   12/04/19  15:10    


 


Antibody Screen  Negative   12/04/19  15:10    


 


INR  1.01  (0.83-1.09)   12/04/19  15:10    














A/P: 62 y/o M w/ PMHx GI bleeds, HTN, anxiety, depression, and alcohol abuse a/

w suicidal ideation and rectal bleeding for 1 week. 





Pt s/p 3units pRBCs for h/h 4.2/13. 


Refusing EGD/colonoscopy


[facility]








-No acute general surgery intervention at this time


-Pt NPO, per GI may advance to clears 


-Continue PPIs


-Please reconsult prn








d/w attending Dr Weir

## 2019-12-05 NOTE — PN
Progress Note, Physician





- Current Medication List


Current Medications: 


Active Medications





Buspirone HCl (Buspar -)  10 mg PO BID SILVANA


Sodium Chloride (Normal Saline -)  1,000 mls @ 75 mls/hr IV ASDIR SILVANA


   Last Admin: 12/05/19 09:58 Dose:  75 mls/hr


Octreotide Acetate 200 mcg/Octreotide Acetate 1,000 mcg/Dextrose  500 mls @ 

20.833 mls/hr IVPB Q24H SILVANA; Protocol


   Last Admin: 12/05/19 14:03 Dose:  20.833 mls/hr


Metoprolol Succinate (Toprol Xl -)  25 mg PO DAILY SILVANA


Nifedipine (Procardia Xl -)  30 mg PO DAILY SILVANA


Non-Formulary Medication (Bupropion Hcl [Bupropion Hcl Er])  200 mg PO BID SILVANA


Non-Formulary Medication (Trazodone Hcl [Trazodone Hcl])  300 mg PO HS SILVANA


Pantoprazole Sodium (Protonix Iv)  40 mg IVPUSH BID SILVANA


   Last Admin: 12/05/19 09:58 Dose:  40 mg











- Objective


Vital Signs: 


 Vital Signs











Temperature  98 F   12/05/19 11:36


 


Pulse Rate  88   12/05/19 14:00


 


Respiratory Rate  18   12/05/19 16:00


 


Blood Pressure  151/91   12/05/19 14:00


 


O2 Sat by Pulse Oximetry (%)  100   12/04/19 22:22











Constitutional: Yes: No Distress


HENT: Yes: Atraumatic


Neck: Yes: Supple


Cardiovascular: Yes: Regular Rate and Rhythm


Respiratory: Yes: CTA Bilaterally


Gastrointestinal: Yes: Normal Bowel Sounds


Extremities: Yes: WNL


Neurological: Yes: Alert, Oriented


Labs: 


 CBC, BMP





 12/05/19 10:55 





 12/05/19 10:55 





 INR, PTT











INR  1.01  (0.83-1.09)   12/04/19  15:10    














Problem List





- Problems


(1) Lower GI bleed


Code(s): K92.2 - GASTROINTESTINAL HEMORRHAGE, UNSPECIFIED   





(2) Alcohol abuse


Code(s): F10.10 - ALCOHOL ABUSE, UNCOMPLICATED   





(3) Anemia


Code(s): D64.9 - ANEMIA, UNSPECIFIED   


Qualifiers: 


   Anemia type: unspecified type   Qualified Code(s): D64.9 - Anemia, 

unspecified   





(4) HTN (hypertension)


Code(s): I10 - ESSENTIAL (PRIMARY) HYPERTENSION   





Assessment/Plan





 Laboratory Tests











  12/04/19 12/04/19 12/04/19





  15:10 15:10 15:10


 


WBC   5.4 


 


RBC   1.54 L 


 


Hgb   4.2 L* 


 


Hct   13.0 L 


 


MCV   84.6 


 


MCH   27.0  D 


 


MCHC   31.9 L 


 


RDW   21.7 H 


 


Plt Count   196  D 


 


MPV   7.9 


 


Absolute Neuts (auto)   4.0 


 


Neutrophils %   73.4  D 


 


Lymphocytes %   18.9  D 


 


Monocytes %   5.7 


 


Eosinophils %   1.3 


 


Basophils %   0.7 


 


Nucleated RBC %   0 


 


Hypochromia   3+ 


 


Platelet Estimate   Normal 


 


Polychromasia   1+ 


 


Poikilocytosis   1+ 


 


Anisocytosis   2+ 


 


Microcytosis   2+ 


 


Macrocytosis   0 


 


Tear Drop Cells   1+ 


 


Ovalocytes   1+ 


 


PT with INR  11.90  


 


INR  1.01  


 


PTT (Actin FS)  24.5 L  


 


Sodium    140


 


Potassium    4.4


 


Chloride    107


 


Carbon Dioxide    26


 


Anion Gap    7 L


 


BUN    15.7


 


Creatinine    1.4 H


 


Est GFR (CKD-EPI)AfAm    61.97


 


Est GFR (CKD-EPI)NonAf    53.47


 


Random Glucose    120 H


 


Calcium    8.0 L


 


Total Bilirubin    0.2


 


AST    10 L


 


ALT    15


 


Alkaline Phosphatase    29 L


 


Total Protein    5.3 L


 


Albumin    2.6 L


 


Blood Type   


 


Antibody Screen   


 


Crossmatch   














  12/04/19





  15:10


 


WBC 


 


RBC 


 


Hgb 


 


Hct 


 


MCV 


 


MCH 


 


MCHC 


 


RDW 


 


Plt Count 


 


MPV 


 


Absolute Neuts (auto) 


 


Neutrophils % 


 


Lymphocytes % 


 


Monocytes % 


 


Eosinophils % 


 


Basophils % 


 


Nucleated RBC % 


 


Hypochromia 


 


Platelet Estimate 


 


Polychromasia 


 


Poikilocytosis 


 


Anisocytosis 


 


Microcytosis 


 


Macrocytosis 


 


Tear Drop Cells 


 


Ovalocytes 


 


PT with INR 


 


INR 


 


PTT (Actin FS) 


 


Sodium 


 


Potassium 


 


Chloride 


 


Carbon Dioxide 


 


Anion Gap 


 


BUN 


 


Creatinine 


 


Est GFR (CKD-EPI)AfAm 


 


Est GFR (CKD-EPI)NonAf 


 


Random Glucose 


 


Calcium 


 


Total Bilirubin 


 


AST 


 


ALT 


 


Alkaline Phosphatase 


 


Total Protein 


 


Albumin 


 


Blood Type  A POSITIVE


 


Antibody Screen  Negative


 


Crossmatch  See Detail








Active Medications











Generic Name Dose Route Start Last Admin





  Trade Name Freq  PRN Reason Stop Dose Admin


 


Sodium Chloride  1,000 mls @ 75 mls/hr  12/04/19 18:30  





  Normal Saline -  IV   





  ASDIR SILVANA   





     





     





     





     


 


Pantoprazole Sodium  40 mg  12/04/19 22:00  





  Protonix Iv  IVPUSH   





  BID SILVANA   





     





     





     





     








Active Medications











Generic Name Dose Route Start Last Admin





  Trade Name Freq  PRN Reason Stop Dose Admin


 


Sodium Chloride  1,000 mls @ 75 mls/hr  12/04/19 18:30  12/04/19 19:40





  Normal Saline -  IV   75 mls/hr





  ASDIR SILVANA   Administration





     





     





     





     


 


Pantoprazole Sodium  40 mg  12/04/19 22:00  





  Protonix Iv  IVPUSH   





  BID SILVANA   





     





     





     





     














cc time 30 min

## 2020-03-08 ENCOUNTER — HOSPITAL ENCOUNTER (INPATIENT)
Dept: HOSPITAL 74 - JER | Age: 65
LOS: 5 days | Discharge: ANOTHER HOSPITAL | DRG: 439 | End: 2020-03-13
Attending: GENERAL ACUTE CARE HOSPITAL | Admitting: INTERNAL MEDICINE
Payer: COMMERCIAL

## 2020-03-08 VITALS — BODY MASS INDEX: 26.4 KG/M2

## 2020-03-08 DIAGNOSIS — K86.1: ICD-10-CM

## 2020-03-08 DIAGNOSIS — I85.00: ICD-10-CM

## 2020-03-08 DIAGNOSIS — F10.10: ICD-10-CM

## 2020-03-08 DIAGNOSIS — I10: ICD-10-CM

## 2020-03-08 DIAGNOSIS — K52.9: ICD-10-CM

## 2020-03-08 DIAGNOSIS — K86.2: ICD-10-CM

## 2020-03-08 DIAGNOSIS — K85.90: Primary | ICD-10-CM

## 2020-03-08 DIAGNOSIS — E86.0: ICD-10-CM

## 2020-03-08 DIAGNOSIS — F32.9: ICD-10-CM

## 2020-03-08 DIAGNOSIS — N17.9: ICD-10-CM

## 2020-03-08 DIAGNOSIS — E88.09: ICD-10-CM

## 2020-03-08 DIAGNOSIS — D73.5: ICD-10-CM

## 2020-03-08 LAB
APPEARANCE UR: CLEAR
BACTERIA # UR AUTO: 0.3 /HPF
BASOPHILS # BLD: 1.2 % (ref 0–2)
BILIRUB UR STRIP.AUTO-MCNC: NEGATIVE MG/DL
CASTS URNS QL MICRO: 3 /LPF (ref 0–8)
COLOR UR: YELLOW
DEPRECATED RDW RBC AUTO: 19.9 % (ref 11.9–15.9)
EOSINOPHIL # BLD: 2.9 % (ref 0–4.5)
EPITH CASTS URNS QL MICRO: 0.9 /HPF
HCT VFR BLD CALC: 40.2 % (ref 35.4–49)
HGB BLD-MCNC: 13.5 GM/DL (ref 11.7–16.9)
KETONES UR QL STRIP: NEGATIVE
LEUKOCYTE ESTERASE UR QL STRIP.AUTO: (no result)
LYMPHOCYTES # BLD: 22.6 % (ref 8–40)
MCH RBC QN AUTO: 28 PG (ref 25.7–33.7)
MCHC RBC AUTO-ENTMCNC: 33.6 G/DL (ref 32–35.9)
MCV RBC: 83.5 FL (ref 80–96)
MONOCYTES # BLD AUTO: 9.7 % (ref 3.8–10.2)
NEUTROPHILS # BLD: 63.6 % (ref 42.8–82.8)
NITRITE UR QL STRIP: NEGATIVE
PH UR: 7.5 [PH] (ref 5–8)
PLATELET # BLD AUTO: 226 K/MM3 (ref 134–434)
PMV BLD: 8.7 FL (ref 7.5–11.1)
PROT UR QL STRIP: NEGATIVE
PROT UR QL STRIP: NEGATIVE
RBC # BLD AUTO: 3 /HPF (ref 0–4)
RBC # BLD AUTO: 4.81 M/MM3 (ref 4–5.6)
SP GR UR: 1.01 (ref 1.01–1.03)
UROBILINOGEN UR STRIP-MCNC: 0.2 MG/DL (ref 0.2–1)
WBC # BLD AUTO: 8.5 K/MM3 (ref 4–10)
WBC # UR AUTO: 3 /HPF (ref 0–5)

## 2020-03-08 NOTE — PDOC
Documentation entered by Gasper Gottlieb SCRIBE, acting as scribe for 

Krissy Cerda MD.








Krissy Cerda MD:  This documentation has been prepared by the Chery collins Nirvannie, SCRIBE, under my direction and personally reviewed by me in 

its entirety.  I confirm that the documentation accurately reflects all work, 

treatment, procedures, and medical decision making performed by me.  





Attending Attestation





- Resident


Resident Name: Cirilo Leong





- ED Attending Attestation


I have performed the following: I have examined & evaluated the patient, The 

case was reviewed & discussed with the resident, I agree w/resident's findings &

plan, Exceptions are as noted





- HPI


HPI: 





03/08/20 23:20


The patient is a 64 year old male, with a significant past medical history of 

HTN, alcohol abuse (last drink 2 weeks ago), pancreatitis, lower GI bleed, major

depressive disorder, who presents to the emergency department with constant 

sharp epigastric discomfort with associated bilateral flank pain and nausea with

one episode of NBNB emesis (self-induced). Patient notes his symptoms initially 

started after having soup. [facility] 

[facility] which [facility] Levaquin and proton pump inhibitor. 

Patient notes decreased PO intake secondary to his symptoms. 





Allergies: Lisinopril.





- Physicial Exam


PE: 





03/08/20 23:35


64-year-old male has complaint of epigastric pain and bilateral flank pain


Head normocephalic atraumatic


Neck is supple


Lungs are clear to auscultation bilaterally


CVS regular rate and rhythm S1-S2


Abdominal exam epigastric pain


Bilateral CVA tenderness


Skin warm and dry


Neuro alert and oriented x3, ambulatory no gross focal neuro deficits





- Medical Decision Making





03/08/20 23:36


64-year-old male with a history of pancreatitis, alcohol abuse, GI bleeds 

presents with epigastric pain and bilateral flank pain





No fever or chills or cough or chest pain


Denies any vomiting


03/08/20 23:46


Concern for pancreatitis, gastritis, kidney stones, musculoskeletal pain


Plan CAT scan abdomen pelvis with contrast, IV fluids, CBC, chemistries, lipase,

EKG, chest x-ray


03/09/20 01:23


EKG is normal sinus rhythm at 76 bpm, QTC C is 434 ms, no acute ST elevations or

depressions are appreciated


03/09/20 01:56


CBC is unremarkable





creatinine is equal to 1.4,


LFTs and lipase are within normal limits


Troponin is negative








ct scan pending

## 2020-03-08 NOTE — PDOC
History of Present Illness





- General


Chief Complaint: Pain


Stated Complaint: ABDOMINAL PAIN


Time Seen by Provider: 03/08/20 22:11


History Source: Patient


Exam Limitations: No Limitations





- History of Present Illness


Initial Comments: 


64M PMH HTN, etoh abuse, pancreatitis, LGIB, MDD presenting with a few hours of 

sharp constant epigastric pain and b/l flank pain a few hours after having soup 

today. Endorses nausea and one episode of nbnb self induced emesis. Denies 

diarrhea; passing flatus. Recently eval'd at [facility] for similar sx and DC'd w/ PPI

and levaquin. Denies f/c, cp/sob. Endorses heavy etoh use but has been cutting 

down; states last drink was 2 weeks ago. No h/o renal stones. 














Past History





- Past Medical History


Allergies/Adverse Reactions: 


                                    Allergies











Allergy/AdvReac Type Severity Reaction Status Date / Time


 


lisinopril Allergy   Verified 03/08/20 20:25











Home Medications: 


Ambulatory Orders





Buspirone HCl [Buspar -] 10 mg PO BID 06/08/19 


Metoprolol Succinate [Toprol Xl] 25 mg PO DAILY 06/08/19 


Nifedipine ER [Procardia XL -] 30 mg PO DAILY 06/08/19 


Trazodone HCl 300 mg PO HS 06/08/19 


Bupropion HCl [Bupropion HCl ER] 200 mg PO BID 06/10/19 


Pantoprazole Sodium 40 mg PO BID #60 tablet. 06/13/19 








Anemia: No


Asthma: No


Cancer: No


Cardiac Disorders: Yes


CVA: No


COPD: Yes


Dementia: No


Diabetes: No


GI Disorders: Yes (gi bleed)


 Disorders: No


HTN: Yes


Hypercholesterolemia: No


Liver Disease: No


Psychiatric Problems: Yes (depression, alcohol abuse)


Seizures: No


Thyroid Disease: No





- Surgical History


Abdominal Surgery: No


Appendectomy: No


Cardiac Surgery: No


Cholecystectomy: No


Lung Surgery: No


Neurologic Surgery: No


Orthopedic Surgery: Yes (left hip replacement)





- Psycho Social/Smoking Cessation Hx


Smoking History: Never smoked


Have you smoked in the past 12 months: No


If you are a former smoker, when did you quit?: 1989


Hx Alcohol Use: Yes


Drug/Substance Use Hx: No


Substance Use Type: Alcohol


Hx Substance Use Treatment: Yes (quit 2008)





**Review of Systems





- Review of Systems


Able to Perform ROS?: Yes


Comments:: 


CONSTITUTIONAL: Denies F / C


HEENT: Denies headache, lightheadedness, dizziness


RESP: Denies SOB


CARD: Denies chest pain


GI: Endorses nausea, abdominal, flank pain, inability to tolerate PO. Denies 

bloody stool


: Denies dysuria, hematuria


SKIN: Denies rashes


NEURO: Denies numbness, tingling, weakness


MSK: Denies back pain











*Physical Exam





- Vital Signs


                                Last Vital Signs











Temp Pulse Resp BP Pulse Ox


 


 97.8 F   100 H  18   130/81   98 


 


 03/08/20 20:23  03/08/20 20:23  03/08/20 20:23  03/08/20 20:23  03/08/20 20:23














- Physical Exam


GEN: nontoxic, uncomfortable, restless in pain. AAOx3.


HEENT: NC/AT, EOMI, PERRL. No facial asymmetry. Moist mucous membranes. Normal 

voice. Supple neck w/ FROM.


CV: S1/S2, RRR, no m/r/g


LUNG: CTAB, no wheezes, crackles, rales, rhonchi. 


GI: +TTP of the epigastrium and RLQ w/ guarding. No tenderness to light 

percussion. soft, nd, +BS, no rebound. No masses.  Neg CVAT b/l. 


MSK: No obvious deformities of all extremities. 


SKIN: Warm, dry, no rashes appreciated.


PSYCH: Normal mood and affect.


NEURO: Moving all extremities well. ambulates w/ normal gait. 








ED Treatment Course





- LABORATORY


CBC & Chemistry Diagram: 


                                 03/09/20 06:32





                                 03/09/20 06:32





- RADIOLOGY


Radiology Studies Ordered: 














 Category Date Time Status


 


 CHEST X-RAY PORTABLE* [RAD] Stat Radiology  03/08/20 22:25 Ordered














Medical Decision Making





- Medical Decision Making





03/08/20 22:27


64M PMH HTN, etoh abuse, pancreatitis, LGIB, MDD presenting with epigastric and 

b/l flank pain. Denies recent etoh. eval'd at [facility] for similar sx dc'd w/ 

levaquin. Afebrile, uncomfortable, +TTP of epigastrium and b/l flanks. 


DDX - PUD, Pancreatitis, Appendicitis, Renal stones, Colitis 


- CBC, CMP, Cardiac, Lipase


- CXR


- EKG


- GI meds + fluids 


- CT A/P








// signed out to PM team 








Discharge





- Discharge Information


Problems reviewed: Yes


Clinical Impression/Diagnosis: 


 Pancreatic pseudocyst





Pancreatitis


Qualifiers:


 Chronicity: chronic Pancreatitis type: alcohol induced Qualified Code(s): K86.0

 - Alcohol-induced chronic pancreatitis





Condition: Stable





- Follow up/Referral





- Patient Discharge Instructions





- Post Discharge Activity

## 2020-03-09 LAB
ALBUMIN SERPL-MCNC: 3.3 G/DL (ref 3.4–5)
ALP SERPL-CCNC: 56 U/L (ref 45–117)
ALT SERPL-CCNC: 16 U/L (ref 13–61)
ANION GAP SERPL CALC-SCNC: 11 MMOL/L (ref 8–16)
ANION GAP SERPL CALC-SCNC: 11 MMOL/L (ref 8–16)
AST SERPL-CCNC: 16 U/L (ref 15–37)
BILIRUB CONJ SERPL-MCNC: 0.2 MG/DL (ref 0–0.2)
BILIRUB SERPL-MCNC: 0.4 MG/DL (ref 0.2–1)
BILIRUB SERPL-MCNC: 1.4 MG/DL (ref 0.2–1)
BUN SERPL-MCNC: 11.9 MG/DL (ref 7–18)
BUN SERPL-MCNC: 13 MG/DL (ref 7–18)
CALCIUM SERPL-MCNC: 8.2 MG/DL (ref 8.5–10.1)
CALCIUM SERPL-MCNC: 8.4 MG/DL (ref 8.5–10.1)
CHLORIDE SERPL-SCNC: 100 MMOL/L (ref 98–107)
CHLORIDE SERPL-SCNC: 98 MMOL/L (ref 98–107)
CO2 SERPL-SCNC: 23 MMOL/L (ref 21–32)
CO2 SERPL-SCNC: 23 MMOL/L (ref 21–32)
CREAT SERPL-MCNC: 1.4 MG/DL (ref 0.55–1.3)
CREAT SERPL-MCNC: 1.4 MG/DL (ref 0.55–1.3)
DEPRECATED RDW RBC AUTO: 19.9 % (ref 11.9–15.9)
GLUCOSE SERPL-MCNC: 142 MG/DL (ref 74–106)
GLUCOSE SERPL-MCNC: 147 MG/DL (ref 74–106)
HCT VFR BLD CALC: 36.8 % (ref 35.4–49)
HGB BLD-MCNC: 12.5 GM/DL (ref 11.7–16.9)
MCH RBC QN AUTO: 28.2 PG (ref 25.7–33.7)
MCHC RBC AUTO-ENTMCNC: 33.8 G/DL (ref 32–35.9)
MCV RBC: 83.3 FL (ref 80–96)
PLATELET # BLD AUTO: 182 K/MM3 (ref 134–434)
PMV BLD: 8.6 FL (ref 7.5–11.1)
POTASSIUM SERPLBLD-SCNC: 3.8 MMOL/L (ref 3.5–5.1)
POTASSIUM SERPLBLD-SCNC: 3.9 MMOL/L (ref 3.5–5.1)
PROT SERPL-MCNC: 7.6 G/DL (ref 6.4–8.2)
RBC # BLD AUTO: 4.42 M/MM3 (ref 4–5.6)
SODIUM SERPL-SCNC: 132 MMOL/L (ref 136–145)
SODIUM SERPL-SCNC: 134 MMOL/L (ref 136–145)
WBC # BLD AUTO: 6.3 K/MM3 (ref 4–10)

## 2020-03-09 RX ADMIN — PANTOPRAZOLE SODIUM SCH MG: 40 INJECTION, POWDER, FOR SOLUTION INTRAVENOUS at 21:17

## 2020-03-09 RX ADMIN — PANTOPRAZOLE SODIUM SCH MG: 40 INJECTION, POWDER, FOR SOLUTION INTRAVENOUS at 14:17

## 2020-03-09 RX ADMIN — FOLIC ACID SCH MG: 1 TABLET ORAL at 13:03

## 2020-03-09 RX ADMIN — MULTIVITAMIN TABLET SCH TAB: TABLET at 13:03

## 2020-03-09 RX ADMIN — SODIUM CHLORIDE, POTASSIUM CHLORIDE, SODIUM LACTATE AND CALCIUM CHLORIDE SCH MLS/HR: 600; 310; 30; 20 INJECTION, SOLUTION INTRAVENOUS at 21:19

## 2020-03-09 RX ADMIN — Medication SCH MG: at 13:04

## 2020-03-09 RX ADMIN — BUSPIRONE HYDROCHLORIDE SCH MG: 10 TABLET ORAL at 21:17

## 2020-03-09 RX ADMIN — TRAZODONE HYDROCHLORIDE SCH MG: 100 TABLET ORAL at 21:17

## 2020-03-09 RX ADMIN — NIFEDIPINE SCH MG: 30 TABLET, EXTENDED RELEASE ORAL at 19:52

## 2020-03-09 NOTE — PDOC
*Physical Exam





- Vital Signs


                                Last Vital Signs











Temp Pulse Resp BP Pulse Ox


 


 97.8 F   100 H  18   130/81   98 


 


 03/08/20 20:23  03/08/20 20:23  03/08/20 20:23  03/08/20 20:23  03/08/20 20:23














ED Treatment Course





- LABORATORY


CBC & Chemistry Diagram: 


                                 03/13/20 07:50





                                 03/13/20 07:50





- ADDITIONAL ORDERS


Additional order review: 


                               Laboratory  Results











  03/09/20 03/09/20 03/09/20





  01:54 00:33 00:33


 


Sodium    134 L


 


Potassium    3.8


 


Chloride    100


 


Carbon Dioxide    23


 


Anion Gap    11


 


BUN    13.0


 


Creatinine    1.4 H


 


Est GFR (CKD-EPI)AfAm    61.53


 


Est GFR (CKD-EPI)NonAf    53.09


 


Random Glucose    142 H


 


Lactic Acid  1.1  


 


Calcium    8.4 L


 


Total Bilirubin    1.4 H


 


AST    16


 


ALT    16


 


Alkaline Phosphatase    56


 


Creatine Kinase   


 


Troponin I    < 0.02


 


Total Protein    7.6


 


Albumin    3.3 L


 


Lipase   130 


 


Urine Color   


 


Urine Appearance   


 


Urine pH   


 


Ur Specific Gravity   


 


Urine Protein   


 


Urine Glucose (UA)   


 


Urine Ketones   


 


Urine Blood   


 


Urine Nitrite   


 


Urine Bilirubin   


 


Urine Urobilinogen   


 


Ur Leukocyte Esterase   


 


Urine WBC (Auto)   


 


Urine RBC (Auto)   


 


Urine Casts (Auto)   


 


U Epithel Cells (Auto)   


 


Urine Bacteria (Auto)   














  03/08/20 03/08/20





  23:05 23:05


 


Sodium  Cancelled 


 


Potassium  Cancelled 


 


Chloride  Cancelled 


 


Carbon Dioxide  Cancelled 


 


Anion Gap  Cancelled 


 


BUN  Cancelled 


 


Creatinine  Cancelled 


 


Est GFR (CKD-EPI)AfAm  Cancelled 


 


Est GFR (CKD-EPI)NonAf  Cancelled 


 


Random Glucose  Cancelled 


 


Lactic Acid  


 


Calcium  Cancelled 


 


Total Bilirubin  Cancelled 


 


AST  Cancelled 


 


ALT  Cancelled 


 


Alkaline Phosphatase  Cancelled 


 


Creatine Kinase  Cancelled 


 


Troponin I  Cancelled 


 


Total Protein  Cancelled 


 


Albumin  Cancelled 


 


Lipase  


 


Urine Color   Yellow


 


Urine Appearance   Clear


 


Urine pH   7.5


 


Ur Specific Gravity   1.014


 


Urine Protein   Negative


 


Urine Glucose (UA)   Negative


 


Urine Ketones   Negative


 


Urine Blood   Negative


 


Urine Nitrite   Negative


 


Urine Bilirubin   Negative


 


Urine Urobilinogen   0.2


 


Ur Leukocyte Esterase   Trace


 


Urine WBC (Auto)   3


 


Urine RBC (Auto)   3


 


Urine Casts (Auto)   3


 


U Epithel Cells (Auto)   0.9


 


Urine Bacteria (Auto)   0.3








                                        











  03/08/20





  23:05


 


RBC  4.81


 


MCV  83.5


 


MCHC  33.6


 


RDW  19.9 H


 


MPV  8.7  D


 


Neutrophils %  63.6


 


Lymphocytes %  22.6  D


 


Monocytes %  9.7


 


Eosinophils %  2.9


 


Basophils %  1.2














- Medications


Given in the ED: 


ED Medications














Discontinued Medications














Generic Name Dose Route Start Last Admin





  Trade Name Inés  PRN Reason Stop Dose Admin


 


Acetaminophen  1,000 mg  03/08/20 22:26  03/08/20 23:52





  Ofirmev Injection -  IVPB  03/08/20 22:27  1,000 mg





  ONCE ONE   Administration


 


Al Hydroxide/Mg Hydroxide  30 ml  03/08/20 22:26  03/08/20 23:53





  Mylanta Oral Suspension -  PO  03/08/20 22:27  Not Given





  ONCE ONE  


 


Famotidine/Sodium Chloride  20 mg in 50 mls @ 100 mls/hr  03/08/20 22:25  

03/08/20 23:52





  Pepcid 20 Mg Premixed Ivpb -  IVPB  03/08/20 22:54  100 mls/hr





  ONCE ONE   Administration


 


Morphine Sulfate  2 mg  03/09/20 01:17  03/09/20 01:49





  Morphine Injection -  IVPUSH  03/09/20 01:18  2 mg





  ONCE ONE   Administration


 


Ondansetron HCl  4 mg  03/08/20 22:26  03/08/20 23:53





  Zofran Injection  IVPUSH  03/08/20 22:27  4 mg





  ONCE ONE   Administration


 


Sodium Chloride  1,000 ml  03/08/20 22:26  03/08/20 23:52





  Normal Saline -  IV  03/08/20 22:27  1,000 ml





  ONCE ONE   Administration














Medical Decision Making





- Medical Decision Making





03/09/20 03:47


Patient Name: AZUL HERNANDEZ


THIS IS A PRELIMINARY REPORT FROM IMAGING ON CALL


DATE OF SERVICE: 2020-03-09 02:06:58


IMAGES: [phone]


EXAM: ABDOMEN \T\ PELVIS CT WITH CONTR


HISTORY: Epigastric and flank pain


COMPARISON: June 8, 2019


FINDINGS:


Again note is made of multiple pancreatic calcifications consistent with chronic

pancreatitis. Again


note is made of pancreatic ductal dilatation.


Compared to the June 8, 2019 scan, there is a new cystic lesion, likely 

pseudocyst measuring 6.3


cm x 7.1 cm.. There are some inflammatory changes around the pseudocyst as well 

as around the


head/body junction of the pancreas. Therefore, the possibility of acute 

superimposed upon chronic


pancreatitis should be considered.


Please note that while this most likely represents a pseudocyst, the possibility

of cystic lesion or


cystic neoplasm cannot be excluded. Follow-up recommended.


Again note is made of poor opacification of the splenic vein. May be chronically

occluded or very


small.


Intrahepatic biliary ducts are slightly dilated. Correlate with biliary enzymes 

to help rule out biliary


obstruction. However this appears to have been present on the prior scan as 

well.


No definite gallbladder abnormalities.


Normal spleen.


Normal adrenal glands, normal kidneys urinary tracts and urinary bladder.


No bowel obstruction or inflammation.


Osseous structures are intact. Left hip prosthesis.


03/09/20 03:47


New pseudocyst [id number]; needs drainage and GI workup. Pt is unable to tolerate 

PO. 





Discharge





- Discharge Information


Problems reviewed: Yes


Clinical Impression/Diagnosis: 


 Pancreatic pseudocyst





Pancreatitis


Qualifiers:


 Chronicity: chronic Pancreatitis type: alcohol induced Qualified Code(s): K86.0

- Alcohol-induced chronic pancreatitis





Condition: Stable


Disposition: TRANSFER ACUTE CARE/OTHER HOSP





- Follow up/Referral





- Patient Discharge Instructions





- Post Discharge Activity

## 2020-03-09 NOTE — HP
CHIEF COMPLAINT: Abdominal Pain





PCP: Follows at the [facility] 





HISTORY OF PRESENT ILLNESS:


64 M PMH of Chronic pancreatitis, HTN, depression, lower GI bleed, PTSD, TBI who

presents today with b/l flank pain and abdominal pain. Patient states he had one

episode of similar pain on Thursday 3/5/20 after eating a can of chicken broth. 

He subsequently [facility] who [facility] him levaquin 

and omeprazole. He then had the same brand of canned chicken broth on Saturday 

and then he began to have his current abdominal pain and flank pain. His pain is

located in the mid-epigastric region and is nonradiating. He has had 5 episodes 

of induced NBNB emesis. He denies any chest pain, shortness of breath, nausea, 

diarrhea, and dysuria.





ER course was notable for:


(1) Was given morphine, zofran, mylanta, tylenol IV, and pepcid


(2) CTA/P shows  multiple pancreatic calcifications consistent with chronic 

pancreatitis. Again note is made of pancreatic ductal dilatation.


Compared to the June 8, 2019 scan, there is a new cystic lesion, likely 

pseudocyst measuring 6.3 cm x 7.1 cm.. There are some inflammatory changes 

around the pseudocyst as well as around the head/body junction of the pancreas. 

Therefore, the possibility of acute superimposed upon chronic


pancreatitis should be considered. Please note that while this most likely 

represents a pseudocyst, the possibility of cystic lesion or cystic neoplasm 

cannot be excluded. Follow-up recommended.Again note is made of poor 

opacification of the splenic vein. May be chronically occluded or very small. 

Intrahepatic biliary ducts are slightly dilated. Correlate with biliary enzymes 

to help rule out biliary obstruction. However this appears to have been present 

on the prior scan as well.No definite gallbladder abnormalities. Normal spleen. 

Normal adrenal glands, normal kidneys urinary tracts and urinary bladder.No 

bowel obstruction or inflammation.Osseous structures are intact. Left hip 

prosthesis.





Recent Travel: None





PAST MEDICAL HISTORY: Depression, Chronic Pancreatitis, HTN, lower GI bleed, 

PTSD, TBI





FAMILY MEDICAL HISTORY: HTN and Aneurysms on mother side. HTN on father side





PAST SURGICAL HISTORY: Left Hip replacement. 





Social History:


Smoking: Denies


Alcohol: Has EtOH abuse history. Last drink 2 weeks ago


Drugs: Last substance use was 8 years ago. 





Allergies





lisinopril Allergy (Verified 03/08/20 20:25)


   








HOME MEDICATIONS:


                                Home Medications











 Medication  Instructions  Recorded


 


Buspirone HCl [Buspar -] 10 mg PO BID 06/08/19


 


Metoprolol Succinate [Toprol Xl] 25 mg PO DAILY 06/08/19


 


Nifedipine ER [Procardia XL -] 30 mg PO DAILY 06/08/19


 


Trazodone HCl 300 mg PO HS 06/08/19


 


Bupropion HCl [Bupropion HCl ER] 200 mg PO BID 06/10/19


 


Pantoprazole Sodium 40 mg PO BID #60 tablet. 06/13/19








REVIEW OF SYSTEMS


CONSTITUTIONAL: 


Absent:  fever, chills, diaphoresis, generalized weakness, malaise, loss of 

appetite, weight change


HEENT: 


Absent:  rhinorrhea, nasal congestion, throat pain, throat swelling, difficulty 

swallowing, mouth swelling, ear pain, eye pain, visual changes


CARDIOVASCULAR: 


Absent: chest pain, syncope, palpitations, irregular heart rate, 

lightheadedness, peripheral edema


RESPIRATORY: 


Absent: cough, shortness of breath, dyspnea with exertion, orthopnea, wheezing, 

stridor, hemoptysis


GASTROINTESTINAL:


Present: abdominal pain


Absent: abdominal distension, nausea, vomiting, diarrhea, constipation, melena, 

hematochezia


GENITOURINARY: 


Present: flank pain


Absent: dysuria, frequency, urgency, hesitancy, hematuria,, genital pain


MUSCULOSKELETAL: 


Absent: myalgia, arthralgia, joint swelling, back pain, neck pain


SKIN: 


Absent: rash, itching, pallor


HEMATOLOGIC/IMMUNOLOGIC: 


Absent: easy bleeding, easy bruising, lymphadenopathy, frequent infections


ENDOCRINE:


Absent: unexplained weight gain, unexplained weight loss, heat intolerance, cold

intolerance


NEUROLOGIC: 


Absent: headache, focal weakness or paresthesias, dizziness, unsteady gait, 

seizure, mental status changes, bladder or bowel incontinence


PSYCHIATRIC: 


Absent: anxiety, depression, suicidal or homicidal ideation, hallucinations.








PHYSICAL EXAMINATION


                               Vital Signs - 24 hr











  03/08/20 03/09/20





  20:23 06:56


 


Temperature 97.8 F 


 


Pulse Rate 100 H 


 


Pulse Rate [  108 H





Apical]  


 


Respiratory 18 18





Rate  


 


Blood Pressure 130/81 


 


Blood Pressure  107/67





[Left Arm]  


 


O2 Sat by Pulse 98 99





Oximetry (%)  











GENERAL: Awake, alert, and fully oriented, in no acute distress.


HEAD: Normal with no signs of trauma.


EYES: Pupils equal, round and reactive to light, extraocular movements intact, 

sclera anicteric, conjunctiva clear. 


EARS, NOSE, THROAT: Ears normal, nares patent, oropharynx clear without 

exudates. Moist mucous membranes.


NECK: Normal range of motion, supple without lymphadenopathy, JVD, or masses.


LUNGS: Breath sounds equal, clear to auscultation bilaterally. No wheezes, and 

no crackles. No accessory muscle use.


HEART: Regular rate and rhythm, normal S1 and S2 without murmur, rub or gallop.


ABDOMEN: Tender in the epigastric region. Soft. Normoactive bowel sounds. 


MUSCULOSKELETAL: B/L CVA tenderness


UPPER EXTREMITIES: 2+ pulses, warm, well-perfused. No cyanosis. No clubbing. No 

peripheral edema.


LOWER EXTREMITIES: 2+ pulses, warm, well-perfused. No calf tenderness. No 

peripheral edema. 


NEUROLOGICAL:  Cranial nerves II-XII intact. Normal speech. Normal gait.


PSYCHIATRIC: Cooperative. Good eye contact. Appropriate mood and affect.


SKIN: Warm, dry, normal turgor, no rashes or lesions noted, normal capillary 

refill. 


                         Laboratory Results - last 24 hr











  03/08/20 03/08/20 03/08/20





  23:05 23:05 23:05


 


WBC  8.5  


 


RBC  4.81  


 


Hgb  13.5  


 


Hct  40.2  D  


 


MCV  83.5  


 


MCH  28.0  


 


MCHC  33.6  


 


RDW  19.9 H  


 


Plt Count  226  D  


 


MPV  8.7  D  


 


Absolute Neuts (auto)  5.4  


 


Neutrophils %  63.6  


 


Lymphocytes %  22.6  D  


 


Monocytes %  9.7  


 


Eosinophils %  2.9  


 


Basophils %  1.2  


 


Nucleated RBC %  0  


 


Sodium    Cancelled


 


Potassium    Cancelled


 


Chloride    Cancelled


 


Carbon Dioxide    Cancelled


 


Anion Gap    Cancelled


 


BUN    Cancelled


 


Creatinine    Cancelled


 


Est GFR (CKD-EPI)AfAm    Cancelled


 


Est GFR (CKD-EPI)NonAf    Cancelled


 


Random Glucose    Cancelled


 


Lactic Acid   


 


Calcium    Cancelled


 


Total Bilirubin    Cancelled


 


AST    Cancelled


 


ALT    Cancelled


 


Alkaline Phosphatase    Cancelled


 


Creatine Kinase    Cancelled


 


Troponin I    Cancelled


 


Total Protein    Cancelled


 


Albumin    Cancelled


 


Lipase   


 


Urine Color   Yellow 


 


Urine Appearance   Clear 


 


Urine pH   7.5 


 


Ur Specific Gravity   1.014 


 


Urine Protein   Negative 


 


Urine Glucose (UA)   Negative 


 


Urine Ketones   Negative 


 


Urine Blood   Negative 


 


Urine Nitrite   Negative 


 


Urine Bilirubin   Negative 


 


Urine Urobilinogen   0.2 


 


Ur Leukocyte Esterase   Trace 


 


Urine WBC (Auto)   3 


 


Urine RBC (Auto)   3 


 


Urine Casts (Auto)   3 


 


U Epithel Cells (Auto)   0.9 


 


Urine Bacteria (Auto)   0.3 














  03/09/20 03/09/20 03/09/20





  00:33 00:33 01:54


 


WBC   


 


RBC   


 


Hgb   


 


Hct   


 


MCV   


 


MCH   


 


MCHC   


 


RDW   


 


Plt Count   


 


MPV   


 


Absolute Neuts (auto)   


 


Neutrophils %   


 


Lymphocytes %   


 


Monocytes %   


 


Eosinophils %   


 


Basophils %   


 


Nucleated RBC %   


 


Sodium  134 L  


 


Potassium  3.8  


 


Chloride  100  


 


Carbon Dioxide  23  


 


Anion Gap  11  


 


BUN  13.0  


 


Creatinine  1.4 H  


 


Est GFR (CKD-EPI)AfAm  61.53  


 


Est GFR (CKD-EPI)NonAf  53.09  


 


Random Glucose  142 H  


 


Lactic Acid    1.1


 


Calcium  8.4 L  


 


Total Bilirubin  1.4 H  


 


AST  16  


 


ALT  16  


 


Alkaline Phosphatase  56  


 


Creatine Kinase   


 


Troponin I  < 0.02  


 


Total Protein  7.6  


 


Albumin  3.3 L  


 


Lipase   130 


 


Urine Color   


 


Urine Appearance   


 


Urine pH   


 


Ur Specific Gravity   


 


Urine Protein   


 


Urine Glucose (UA)   


 


Urine Ketones   


 


Urine Blood   


 


Urine Nitrite   


 


Urine Bilirubin   


 


Urine Urobilinogen   


 


Ur Leukocyte Esterase   


 


Urine WBC (Auto)   


 


Urine RBC (Auto)   


 


Urine Casts (Auto)   


 


U Epithel Cells (Auto)   


 


Urine Bacteria (Auto)   











ASSESSMENT/PLAN:


64 M PMH of Chronic pancreatitis, HTN, depression, lower GI bleed, PTSD, TBI  

presenting today with abdominal pain likely 2/2 to gastroenteritis. 





1) Abdominal pain likely 2/2 to gastroenteritis


- Patient had similar episode of pain with relation to food earlier this week


- CTA/P notes a new pseudocyst, however patient's pain is more towards the flank

and less localized to abdomen. White count doesn't indicate his pseudocyst is 

infected or necrotic. Likely incidental 


- NPO 


- LR @ 100 ml/hr


- Offirmev for pain control Q6H PRN


- No urgent need for antibiotics, will follow GI recommendations. 


- GI consulted, Appreciate recommendations on antibiotic and drainage. 





2) SUE 


-Likely prerenal due to volume loss and dehydration


- Renal U/S ordered. F/U


- Urine lytes ordered





3) Hx of Alcohol abuse


-Thiamine, folate, Multivitamin





DVT: SCD





F: LR @ 100


E: Monitor BMP


N: NPO 





Dispo: Admit to med surg

















Visit type





- Emergency Visit


Emergency Visit: Yes


ED Registration Date: 03/09/20


Care time: The patient presented to the Emergency Department on the above date 

and was hospitalized for further evaluation of their emergent condition.





- New Patient


This patient is new to me today: Yes


Date on this admission: 03/09/20





- Critical Care


Critical Care patient: No





ATTENDING PHYSICIAN STATEMENT





I saw and evaluated the patient.


I reviewed the resident's note and discussed the case with the resident.


I agree with the resident's findings and plan as documented.








SUBJECTIVE:








OBJECTIVE:








ASSESSMENT AND PLAN:

## 2020-03-09 NOTE — EKG
Test Reason : 

Blood Pressure : ***/*** mmHG

Vent. Rate : 076 BPM     Atrial Rate : 076 BPM

   P-R Int : 186 ms          QRS Dur : 078 ms

    QT Int : 386 ms       P-R-T Axes : 057 015 035 degrees

   QTc Int : 434 ms

 

NORMAL SINUS RHYTHM WITH SINUS ARRHYTHMIA

POSSIBLE LEFT ATRIAL ENLARGEMENT

BORDERLINE ECG

WHEN COMPARED WITH ECG OF 04-DEC-2019 15:20,

NO SIGNIFICANT CHANGE WAS FOUND

Confirmed by Jun Oswald ([id number]) on 3/9/2020 9:30:04 AM

 

Referred By:             Confirmed By:Jun Oswald

## 2020-03-09 NOTE — DS
Physical Examination


Vital Signs: 


                                   Vital Signs











Temperature  97.5 F L  03/09/20 15:45


 


Pulse Rate  89   03/09/20 15:45


 


Respiratory Rate  18   03/09/20 15:45


 


Blood Pressure  157/83   03/09/20 15:45


 


O2 Sat by Pulse Oximetry (%)  97   03/09/20 15:45











Constitutional: Yes: Well Nourished


Cardiovascular: Yes: WNL


Respiratory: Yes: WNL


Gastrointestinal: Yes: WNL


Labs: 


                                    CBC, BMP





                                 03/09/20 06:32 





                                 03/09/20 06:32 











Discharge Summary


Problems reviewed: Yes


Reason For Visit: CHRONIC PANCREATITIS,PSEUDOCYST OF PANCREAS


Current Active Problems





Pancreatic pseudocyst (Acute)


Pancreatitis (Acute)








Hospital Course: 








64 year old M with PMH of EtOH abuse came in with diffuse abdominal pain after 

eating a chicken broth . Of note this was second episode with same chicken 

broth. Patient denied any nausea or vomiting, and no bloody stools, melena. CT 

Abdomen was done which was unremarkable except for incidental pancreatic 

pseudocyst. GI was consulted- likely due to alcoholic changes in pancreas. 

Patient also has chronic pancreatitis on CT. 


Patient tolerated oral diet and will follow up with his GI and PMD [facility] 

[facility] Will return to ED if any new symptoms or concerns arise.


Encourage hydration and avoid NSAIDS. 


Condition: Stable





- Instructions





- Home Medications


Comprehensive Discharge Medication List: 


Ambulatory Orders





Buspirone HCl [Buspar -] 10 mg PO BID 06/08/19 


Metoprolol Succinate [Toprol Xl] 25 mg PO DAILY 06/08/19 


Nifedipine ER [Procardia XL -] 30 mg PO DAILY 06/08/19 


Trazodone HCl 300 mg PO HS 06/08/19 


Bupropion HCl [Bupropion HCl ER] 200 mg PO BID 06/10/19 


Pantoprazole Sodium 40 mg PO BID #60 tablet. 06/13/19 








This patient is new to me today: Yes


Date on this admission: 03/09/20


Emergency Visit: No


Critical Care patient: No





- Discharge Referral


Referred to [facility] No

## 2020-03-09 NOTE — PDOC
*Physical Exam





- Vital Signs


                                Last Vital Signs











Temp Pulse Resp BP Pulse Ox


 


 97.8 F   100 H  18   130/81   98 


 


 03/08/20 20:23  03/08/20 20:23  03/08/20 20:23  03/08/20 20:23  03/08/20 20:23














ED Treatment Course





- LABORATORY


CBC & Chemistry Diagram: 


                                 03/08/20 23:05





                                 03/09/20 00:33





- ADDITIONAL ORDERS


Additional order review: 


                               Laboratory  Results











  03/08/20 03/08/20





  23:05 23:05


 


Sodium  Cancelled 


 


Potassium  Cancelled 


 


Chloride  Cancelled 


 


Carbon Dioxide  Cancelled 


 


Anion Gap  Cancelled 


 


BUN  Cancelled 


 


Creatinine  Cancelled 


 


Est GFR (CKD-EPI)AfAm  Cancelled 


 


Est GFR (CKD-EPI)NonAf  Cancelled 


 


Random Glucose  Cancelled 


 


Calcium  Cancelled 


 


Total Bilirubin  Cancelled 


 


AST  Cancelled 


 


ALT  Cancelled 


 


Alkaline Phosphatase  Cancelled 


 


Creatine Kinase  Cancelled 


 


Troponin I  Cancelled 


 


Total Protein  Cancelled 


 


Albumin  Cancelled 


 


Urine Color   Yellow


 


Urine Appearance   Clear


 


Urine pH   7.5


 


Ur Specific Gravity   1.014


 


Urine Protein   Negative


 


Urine Glucose (UA)   Negative


 


Urine Ketones   Negative


 


Urine Blood   Negative


 


Urine Nitrite   Negative


 


Urine Bilirubin   Negative


 


Urine Urobilinogen   0.2


 


Ur Leukocyte Esterase   Trace


 


Urine WBC (Auto)   3


 


Urine RBC (Auto)   3


 


Urine Casts (Auto)   3


 


U Epithel Cells (Auto)   0.9


 


Urine Bacteria (Auto)   0.3








                                        











  03/08/20





  23:05


 


RBC  4.81


 


MCV  83.5


 


MCHC  33.6


 


RDW  19.9 H


 


MPV  8.7  D


 


Neutrophils %  63.6


 


Lymphocytes %  22.6  D


 


Monocytes %  9.7


 


Eosinophils %  2.9


 


Basophils %  1.2














- Medications


Given in the ED: 


ED Medications














Discontinued Medications














Generic Name Dose Route Start Last Admin





  Trade Name Inés  PRN Reason Stop Dose Admin


 


Acetaminophen  1,000 mg  03/08/20 22:26  03/08/20 23:52





  Ofirmev Injection -  IVPB  03/08/20 22:27  1,000 mg





  ONCE ONE   Administration


 


Al Hydroxide/Mg Hydroxide  30 ml  03/08/20 22:26  03/08/20 23:53





  Mylanta Oral Suspension -  PO  03/08/20 22:27  Not Given





  ONCE ONE  


 


Famotidine/Sodium Chloride  20 mg in 50 mls @ 100 mls/hr  03/08/20 22:25  

03/08/20 23:52





  Pepcid 20 Mg Premixed Ivpb -  IVPB  03/08/20 22:54  100 mls/hr





  ONCE ONE   Administration


 


Ondansetron HCl  4 mg  03/08/20 22:26  03/08/20 23:53





  Zofran Injection  IVPUSH  03/08/20 22:27  4 mg





  ONCE ONE   Administration


 


Sodium Chloride  1,000 ml  03/08/20 22:26  03/08/20 23:52





  Normal Saline -  IV  03/08/20 22:27  1,000 ml





  ONCE ONE   Administration














Medical Decision Making





- Medical Decision Making





03/09/20 02:43


Sign out received from Dr. Leong. 





64M w/hx EtOH use disorder, pancreatitis p/w sharp epigastric pain, bilateral 

flank pain s/p po intake today. Ddx pancreatitis, PUD. 





Pending: 


CMP (hemolyzed, new labs sent)


CT abdomen pelvis


Pain control





Dispo: 


Pending





---


Lipase - wnl


CBC - wnl


CMP - wnl


UA - negative





On reassessment he continues to have abdominal pain. 2mg morphine ordered. 








---


On reassessment, he reports that pain has subsided. 


Dispo pending CT read








03/09/20 03:48


CT notable for chronic pancreatitis, 6x7cm new pancreatic pseudoscyst since 

2019. 


Plan for admission. 











Discharge





- Discharge Information


Problems reviewed: Yes


Clinical Impression/Diagnosis: 


 Pancreatic pseudocyst





Pancreatitis


Qualifiers:


 Chronicity: chronic Pancreatitis type: alcohol induced Qualified Code(s): K86.0

- Alcohol-induced chronic pancreatitis





Condition: Stable





- Admission


Yes





- Follow up/Referral





- Patient Discharge Instructions





- Post Discharge Activity

## 2020-03-09 NOTE — CON.GI
Consult


Consult Specialty:: GI


Referred by:: Medicine


Reason for Consultation:: pancreatic pseudocyst





- History of Present Illness


Chief Complaint: abd pain


History of Present Illness: 





64M h/o HTN, h/o chronic pancreatitis, reported h/o GI bleeding of unclear 

etiology (pt reports he had a capsule done as well that was negative) presenting

for evaluation of abdominal pain. On wed, had some chicken broth and on thursday

developed diffuse abdominal pain, [facility] 

gastroenteritis. Was back to baseline by Saturday. On Sunday, had similar broth 

and similar pain developed so he came to [facility] ED. He knows of a pancreatic cyst 

but he describes his knowledge of it as small. [facility]. He describes mostly flank pain currently, no N/V or early satiety. Is 

hungry.





- History Source


History Provided By: Patient


Limitations to Obtaining History: No Limitations





- Past Medical History


Cardio/Vascular: Yes: HTN


Gastrointestinal: Yes: Diverticulosis, Other (Tubular adenomas x 2 6/19)


Psych: Yes: Addictions (ETOH abuse), Anxiety, Depression





- Alcohol/Substance Use


Hx Alcohol Use: Yes





- Smoking History


Smoking history: Never smoked


Have you smoked in the past 12 months: No


If you are a former smoker, when did you quit?: 1989





- Social History


Usual Living Arrangement: Alone


History of Recent Travel: No





Home Medications





- Allergies


Allergies/Adverse Reactions: 


                                    Allergies











Allergy/AdvReac Type Severity Reaction Status Date / Time


 


lisinopril Allergy   Verified 03/08/20 20:25














- Home Medications


Home Medications: 


Ambulatory Orders





Buspirone HCl [Buspar -] 10 mg PO BID 06/08/19 


Metoprolol Succinate [Toprol Xl] 25 mg PO DAILY 06/08/19 


Nifedipine ER [Procardia XL -] 30 mg PO DAILY 06/08/19 


Trazodone HCl 300 mg PO HS 06/08/19 


Bupropion HCl [Bupropion HCl ER] 200 mg PO BID 06/10/19 


Pantoprazole Sodium 40 mg PO BID #60 tablet. 06/13/19 











Family Medical History


Family History: Unremarkable





Review of Systems





- Review of Systems


Constitutional: reports: No Symptoms


Eyes: reports: No Symptoms


HENT: reports: No Symptoms


Neck: reports: No Symptoms


Cardiovascular: reports: No Symptoms


Respiratory: reports: No Symptoms


Gastrointestinal: reports: Abdominal Pain.  denies: Melena, Nausea, Vomiting


Musculoskeletal: reports: No Symptoms


Integumentary: reports: No Symptoms


Neurological: reports: No Symptoms


Endocrine: reports: No Symptoms


Hematology/Lymphatic: reports: No Symptoms


Psychiatric: reports: No Symptoms





Physical Exam-GI


Vital Signs: 


                                   Vital Signs











Temperature  98.1 F   03/09/20 07:35


 


Pulse Rate  94 H  03/09/20 07:35


 


Respiratory Rate  18   03/09/20 07:35


 


Blood Pressure  155/92   03/09/20 07:35


 


O2 Sat by Pulse Oximetry (%)  95   03/09/20 07:35











Constitutional: Yes: Well Nourished, No Distress


Eyes: Yes: Conjunctiva Clear


Cardiovascular: Yes: Regular Rate and Rhythm


Respiratory: Yes: CTA Bilaterally


...Palpate: Yes: Soft, Tenderness (mild b/l flank ttp).  No: Tenderness, 

Epigastium


...Rectal Exam: Yes: Deferred


Musculoskeletal: Yes: WNL


Edema: No


Neurological: Yes: Alert, Oriented


Psychiatric: Yes: Alert, Oriented


Labs: 


                                    CBC, BMP





                                 03/09/20 06:32 





                                 03/09/20 06:32 





                                  Hepatic Panel











Total Bilirubin  0.4 mg/dL (0.2-1)   03/09/20  06:32    


 


Direct Bilirubin  0.2 mg/dL (0.0-0.2)   03/09/20  06:32    


 


AST  16 U/L (15-37)   03/09/20  00:33    


 


ALT  16 U/L (13-61)   03/09/20  00:33    


 


Alkaline Phosphatase  56 U/L ()   03/09/20  00:33    


 


Albumin  3.3 g/dl (3.4-5.0)  L  03/09/20  00:33    














Imaging





- Results


Cat Scan: Report Reviewed





Assessment/Plan





Pancreatic pseudocyst - likely sequelae of ETOH pancreatitis. Does not appear 

infected.


Would discuss with pt [facility] regarding prior 

imaging and size, but agree this is likely incidental and requires no urgent 

intervention

## 2020-03-09 NOTE — PN
Teaching Attending Note


Name of Resident: Patricia Roman





ATTENDING PHYSICIAN STATEMENT





I saw and evaluated the patient.


I reviewed the resident's note and discussed the case with the resident.


I agree with the resident's findings and plan as documented.








SUBJECTIVE:


64 year old male, with a significant past medical history of HTN, alcohol abuse 

(last drink 2 weeks ago), pancreatitis, lower GI bleed, major depressive 

disorder


presents with sharp epigastric pain and bilateral flank pain associated with 

nausea and one episode of self-induced emesis, nonbloody.  Patient reports 

eating soup on Wednesday and felt nauseous after he was evaluated for similar 

symptoms [facility] and was discharged on Levaquin no PPI.  Also reported some 

decreased p.o. intake secondary to symptoms.





OBJECTIVE:


                                Last Vital Signs











Temp Pulse Resp BP Pulse Ox


 


 97.8 F   100 H  18   130/81   98 


 


 03/08/20 20:23  03/08/20 20:23  03/08/20 20:23  03/08/20 20:23  03/08/20 20:23








On physical exam patient was not in any acute distress.  He was alert and 

oriented x3.  Head was atraumatic, normocephalic.  Neck was supple with no JVD. 

Cardiovascular exam was S1, S2 with regular rate and rhythm.  Chest was clear to

auscultation bilaterally.  Abdomen soft, epigastric tenderness.  Lower 

extremities are negative for any edema








                              Abnormal Lab Results











  03/08/20 03/09/20





  23:05 00:33


 


RDW  19.9 H 


 


Sodium   134 L


 


Creatinine   1.4 H


 


Random Glucose   142 H


 


Calcium   8.4 L


 


Total Bilirubin   1.4 H


 


Albumin   3.3 L








Imaging studies reviewed


CT of abdomen pelvis reviewedmultiple pancreatic calcifications consistent with

chronic pancreatitis.  New cystic lesion likely pseudocyst measuring 6.3 x 7.1 

cm.  There is some inflammatory changes around the pseudocyst as well as around 

the head/body junction of the pancreas.





ASSESSMENT AND PLAN:


64-year-old male  with symptoms consistent with gastroenteritis, 

incidentally found to have pancreatic pseudocyst on CAT scan of abdomen pelvis. 

Other than epigastric pain is asymptomatic. Abdomen pelvis CT was positive for 

chronic pancreatitis. No fevers or leukocytosis to suggest active infection.  No

evidence of necrosis on CAT scan of abdomen.


Admit to [facility]


GI consult


IV morphine for pain


Keep n.p.o.


Observe off of antibiotics at this time as no clinical evidence of infection


Abstain from alcohol


#SUE


IV fluid hydration


Avoid nephrotoxins


#Hypoalbuminemia


#EtOH abuse history


Thiamine, folate, multivitamin


DVT prophylaxisSCDs

## 2020-03-09 NOTE — PN
Progress Note, Physician


History of Present Illness: 











64 year old M with PMH of EtOH abuse came in with diffuse abdominal pain after 

eating a chicken broth . CT Abdomen was done which was unremarkable except for 

incidental pancreatic pseudocyst. GI was consulted- likely due to alcoholic 

changes in pancreas. Patient also has chronic pancreatitis on CT.  Patient 

transitioned to PO diet.








Today


Pt seen and examined in ED ambulating in no distress


Of note this was second episode with same chicken broth. 


Patient denied any nausea or vomiting, and no bloody stools, melena. 





- Current Medication List


Current Medications: 


Active Medications





Acetaminophen (Ofirmev Injection -)  1,000 mg IVPB Q6H PRN


   PRN Reason: PAIN LEVEL 7 - 10


   Stop: 03/10/20 05:04


Folic Acid (Folic Acid -)  1 mg PO DAILY [facility]


   Last Admin: 03/09/20 13:03 Dose:  1 mg


   Documented by: 


Lactated Ringer's (Lactated Ringers Solution)  1,000 mls @ 100 mls/hr IV ASDIR 

[facility]


   Last Admin: 03/09/20 06:11 Dose:  100 mls/hr


   Documented by: 


Multivitamins/Minerals/Vitamin C (Tab-A-Vit -)  1 tab PO DAILY [facility]


   Last Admin: 03/09/20 13:03 Dose:  1 tab


   Documented by: 


Pantoprazole Sodium (Protonix Iv)  40 mg IVPUSH BID [facility]


   Last Admin: 03/09/20 14:17 Dose:  40 mg


   Documented by: 


Thiamine HCl (Vitamin B1 -)  100 mg PO DAILY [facility]


   Last Admin: 03/09/20 13:04 Dose:  100 mg


   Documented by: 











- Objective


Vital Signs: 


                                   Vital Signs











Temperature  97.9 F   03/09/20 17:53


 


Pulse Rate  89   03/09/20 15:45


 


Respiratory Rate  18   03/09/20 17:53


 


Blood Pressure  166/101 H  03/09/20 17:53


 


O2 Sat by Pulse Oximetry (%)  96   03/09/20 17:53











Labs: 


                                    CBC, BMP





                                 03/09/20 06:32 





                                 03/09/20 06:32 











Impression/Plan


Impression/Plan: 





Acute Gastroenteritis





GI consult appreciated


CT Abd- Pancreatic pseudocyst on CT 


Transition to PO diet


Pain control


PPI 40 mg BID 








Supp:


DVT- ambulation pt will likely d/c after tolerating PO diet 


Supp care


PPI 40 mg BID as home med








Visit type





- Emergency Visit


Emergency Visit: Yes


ED Registration Date: 03/09/20


Care time: The patient presented to the Emergency Department on the above date 

and was hospitalized for further evaluation of their emergent condition.





- New Patient


This patient is new to me today: No





- Critical Care


Critical Care patient: No





- Discharge Referral


Referred to [facility] No

## 2020-03-10 RX ADMIN — Medication SCH MG: at 09:11

## 2020-03-10 RX ADMIN — BUSPIRONE HYDROCHLORIDE SCH MG: 10 TABLET ORAL at 09:11

## 2020-03-10 RX ADMIN — SODIUM CHLORIDE, POTASSIUM CHLORIDE, SODIUM LACTATE AND CALCIUM CHLORIDE SCH MLS/HR: 600; 310; 30; 20 INJECTION, SOLUTION INTRAVENOUS at 21:42

## 2020-03-10 RX ADMIN — PANTOPRAZOLE SODIUM SCH MG: 40 INJECTION, POWDER, FOR SOLUTION INTRAVENOUS at 21:35

## 2020-03-10 RX ADMIN — MULTIVITAMIN TABLET SCH TAB: TABLET at 09:11

## 2020-03-10 RX ADMIN — NIFEDIPINE SCH MG: 30 TABLET, EXTENDED RELEASE ORAL at 09:11

## 2020-03-10 RX ADMIN — PANTOPRAZOLE SODIUM SCH MG: 40 INJECTION, POWDER, FOR SOLUTION INTRAVENOUS at 09:11

## 2020-03-10 RX ADMIN — FOLIC ACID SCH MG: 1 TABLET ORAL at 09:11

## 2020-03-10 RX ADMIN — SODIUM CHLORIDE, POTASSIUM CHLORIDE, SODIUM LACTATE AND CALCIUM CHLORIDE SCH MLS/HR: 600; 310; 30; 20 INJECTION, SOLUTION INTRAVENOUS at 08:33

## 2020-03-10 RX ADMIN — CARVEDILOL SCH MG: 6.25 TABLET, FILM COATED ORAL at 21:35

## 2020-03-10 RX ADMIN — BUSPIRONE HYDROCHLORIDE SCH MG: 10 TABLET ORAL at 21:35

## 2020-03-10 RX ADMIN — TRAZODONE HYDROCHLORIDE SCH MG: 100 TABLET ORAL at 21:35

## 2020-03-10 NOTE — PN
Physical Exam: 


SUBJECTIVE: Patient seen and examined. No acute events overnight. Patient states

that he is still having some abdominal discomfort. He was not able to tolerate 

his lunch but did tolerate a clear liquid breakfast. 








OBJECTIVE:





                                   Vital Signs











 Period  Temp  Pulse  Resp  BP Sys/Goldstein  Pulse Ox


 


 Last 24 Hr  97.9 F-98.8 F  86-98  18-18  136-178/  92-99











GENERAL: The patient is awake, alert, and fully oriented, in no acute distress.


HEAD: Normal with no signs of trauma.


EYES: PERRL, EOMI


ENT: MMM


NECK: Trachea midline, full range of motion, supple. 


LUNGS: Breath sounds equal, clear to auscultation bilaterally, no wheezes, no 

crackles, no accessory muscle use. 


HEART: Regular rate and rhythm, S1, S2 without murmur, rub or gallop.


ABDOMEN: Soft, nontender, nondistended, normoactive bowel sounds, no guarding


EXTREMITIES: 2+ pulses, warm, well-perfused, no edema. 


NEUROLOGICAL: Cranial nerves II through XII grossly intact. Normal speech, gait 

not observed.


PSYCH: Normal mood, normal affect.


SKIN: Warm, dry, normal turgor, no rashes or lesions noted














Active Medications











Generic Name Dose Route Start Last Admin





  Trade Name Freq  PRN Reason Stop Dose Admin


 


Acetaminophen  650 mg  03/10/20 08:54 





  Tylenol -  PO  





  Q6H PRN  





  PAIN  


 


Buspirone HCl  10 mg  03/09/20 22:00  03/10/20 09:11





  Buspar -  PO   10 mg





  BID SILVANA   Administration


 


Carvedilol  6.25 mg  03/10/20 22:00 





  Coreg -  PO  





  BID SILVANA  


 


Folic Acid  1 mg  03/09/20 10:00  03/10/20 09:11





  Folic Acid -  PO   1 mg





  DAILY SILVANA   Administration


 


Lactated Ringer's  1,000 mls @ 75 mls/hr  03/09/20 20:58  03/10/20 08:33





  Lactated Ringers Solution  IV   75 mls/hr





  ASDIR SILVANA   Administration


 


Multivitamins/Minerals/Vitamin C  1 tab  03/09/20 10:00  03/10/20 09:11





  Tab-A-Vit -  PO   1 tab





  DAILY SILVANA   Administration


 


Nifedipine  30 mg  03/09/20 19:30  03/10/20 09:11





  Procardia Xl -  PO   30 mg





  DAILY SILVANA   Administration


 


Pantoprazole Sodium  40 mg  03/09/20 14:00  03/10/20 09:11





  Protonix Iv  IVPUSH   40 mg





  BID SILVANA   Administration


 


Thiamine HCl  100 mg  03/09/20 10:00  03/10/20 09:11





  Vitamin B1 -  PO   100 mg





  DAILY SILVANA   Administration


 


Trazodone HCl  300 mg  03/09/20 22:00  03/09/20 21:17





  Desyrel -  PO   300 mg





  HS SILVANA   Administration











ASSESSMENT/PLAN:


65 y/o/m with PMHx of Chronic pancreatitis, HTN, depression, lower GI bleed, 

PTSD, TBI  presenting today with abdominal pain likely 2/2 to gastroenteritis. 





#Abdominal pain 2/2 to gastroenteritis


- Patient had similar episode of pain with relation to food earlier this week


- CTA/P notes chronic pancreatitis, pancreatic pseudocyst


- GI consulted - Dr. Ledezma - no acute intervention needed - follows 

with [facility] - however patient still complaining of pain, would appreciate 

further GI recs 


- LR @ 75 mls/hr


- Tylenol 650mg PO Q6H


- No urgent need for antibiotics, will follow GI recommendations. 


- Protonix 40mg IV BID 





#SUE 


- Likely prerenal due to volume loss and dehydration


- Renal U/S - no significant abnormalities ordered 


- monitor CMP in AM





#Hx of Alcohol abuse


-Thiamine, folate, Multivitamin





#DM


- A1c - 7.6


- Patient was previously on Metformin but I was told by the [facility] this was

discontinued, unsure as to why, no other diabetes medications started. 


- ISS





#Prophylaxis


- SCDs





#FEN


- LR @ 75mls/hr


- Monitor and replete lytes as needed


- full liquid diabetic diet 





#Disposition


- D/C pending clinical improvement 


- unable to fully confirm patient's medications as patient has not picked up 

medications from [facility] recently and [facility] proving diffucult to reach 





Visit type





- Emergency Visit


Emergency Visit: Yes


ED Registration Date: 03/09/20


Care time: The patient presented to the Emergency Department on the above date 

and was hospitalized for further evaluation of their emergent condition.





- New Patient


This patient is new to me today: No





- Critical Care


Critical Care patient: No





ATTENDING PHYSICIAN STATEMENT





I saw and evaluated the patient.


I reviewed the resident's note and discussed the case with the resident.


I agree with the resident's findings and plan as documented.








SUBJECTIVE:








OBJECTIVE:








ASSESSMENT AND PLAN:

## 2020-03-10 NOTE — PN
Teaching Attending Note


Name of Resident: Don Romero





ATTENDING PHYSICIAN STATEMENT





I saw and evaluated the patient.


I reviewed the resident's note and discussed the case with the resident.


I agree with the resident's findings and plan as documented.








SUBJECTIVE: Still complains of epigastric abdominal pain, preventing him from 

eating. No vomiting. No diarrhea/melena/hematochezia.





OBJECTIVE: Afebrile, Hemodynamically Stable.








                                Last Vital Signs











Temp Pulse Resp BP Pulse Ox


 


 98.6 F   98 H  18   160/90   99 


 


 03/10/20 14:00  03/10/20 14:00  03/10/20 14:00  03/10/20 14:00  03/10/20 09:00








HEENT- Atraumatic, Normocephalic.


Heart - S1, S2, RRR


Lungs - clear to auscultation


Abdomen - Soft, epigastric tenderness. Bowel Sounds normal.


Extremities - no edema, no calf tenderness.








                         Laboratory Results - last 24 hr











  03/09/20





  11:30


 


Ur Random Sodium  61


 


Ur Random Potassium  15.0 L


 


Ur Random Chloride  61 L











                               Current Medications











Generic Name Dose Route Start Last Admin





  Trade Name Freq  PRN Reason Stop Dose Admin


 


Acetaminophen  650 mg  03/10/20 08:54 





  Tylenol -  PO  





  Q6H PRN  





  PAIN  


 


Buspirone HCl  10 mg  03/09/20 22:00  03/10/20 09:11





  Buspar -  PO   10 mg





  BID SILVANA   Administration


 


Carvedilol  6.25 mg  03/10/20 22:00 





  Coreg -  PO  





  BID SILVANA  


 


Folic Acid  1 mg  03/09/20 10:00  03/10/20 09:11





  Folic Acid -  PO   1 mg





  DAILY SILVANA   Administration


 


Lactated Ringer's  1,000 mls @ 75 mls/hr  03/09/20 20:58  03/10/20 08:33





  Lactated Ringers Solution  IV   75 mls/hr





  ASDIR SILVANA   Administration


 


Multivitamins/Minerals/Vitamin C  1 tab  03/09/20 10:00  03/10/20 09:11





  Tab-A-Vit -  PO   1 tab





  DAILY SILVANA   Administration


 


Nifedipine  30 mg  03/09/20 19:30  03/10/20 09:11





  Procardia Xl -  PO   30 mg





  DAILY SILVANA   Administration


 


Pantoprazole Sodium  40 mg  03/09/20 14:00  03/10/20 09:11





  Protonix Iv  IVPUSH   40 mg





  BID SILVANA   Administration


 


Thiamine HCl  100 mg  03/09/20 10:00  03/10/20 09:11





  Vitamin B1 -  PO   100 mg





  DAILY SILVANA   Administration


 


Trazodone HCl  300 mg  03/09/20 22:00  03/09/20 21:17





  Desyrel -  PO   300 mg





  HS SILVANA   Administration











                                Home Medications











 Medication  Instructions  Recorded


 


Buspirone HCl [Buspar -] 10 mg PO BID 06/08/19


 


Coreg   


 


Nifedipine ER [Procardia XL -] 30 mg PO DAILY 06/08/19


 


Trazodone HCl 300 mg PO HS 06/08/19


 


Bupropion HCl [Bupropion HCl ER] 200 mg PO BID 06/10/19


 


Pantoprazole Sodium 40 mg PO BID #60 tablet. 06/13/19











ASSESSMENT/PLAN: 


  


64 year old male with history of Depression, Alcohol abuse, admitted with 

abdominal pain. 





CT Abdomen - unremarkable except for chronic pancreatitis and pancreatic 

pseudocyst. 





1. Acute Gastroenteritis


Ongoing abdominal discomfort. 


GI for further recommendations.


PPI coverage.





2. Chronic Pancreatitis with Pancreatic Pseudocyst


No urgent intervention required as per GI.


GI out-patient follow up.





3. Depression - continue Buspirone, Trazodone, Bupropion.





4. HTN - continue Coreg, Nifedipine. 





5. Hx Alcohol Abuse - no evidence of withdrawals


MVI, Thiamine, Folate.





DVT Px - Heparin SQ


GI Px - PPI

## 2020-03-11 LAB
ALBUMIN SERPL-MCNC: 3 G/DL (ref 3.4–5)
ALP SERPL-CCNC: 48 U/L (ref 45–117)
ALT SERPL-CCNC: 15 U/L (ref 13–61)
ANION GAP SERPL CALC-SCNC: 7 MMOL/L (ref 8–16)
AST SERPL-CCNC: 12 U/L (ref 15–37)
BILIRUB SERPL-MCNC: 0.5 MG/DL (ref 0.2–1)
BUN SERPL-MCNC: 12.5 MG/DL (ref 7–18)
CALCIUM SERPL-MCNC: 8.6 MG/DL (ref 8.5–10.1)
CHLORIDE SERPL-SCNC: 98 MMOL/L (ref 98–107)
CO2 SERPL-SCNC: 28 MMOL/L (ref 21–32)
CREAT SERPL-MCNC: 1.5 MG/DL (ref 0.55–1.3)
GLUCOSE SERPL-MCNC: 140 MG/DL (ref 74–106)
POTASSIUM SERPLBLD-SCNC: 4.1 MMOL/L (ref 3.5–5.1)
PROT SERPL-MCNC: 6.6 G/DL (ref 6.4–8.2)
SODIUM SERPL-SCNC: 132 MMOL/L (ref 136–145)

## 2020-03-11 RX ADMIN — FOLIC ACID SCH MG: 1 TABLET ORAL at 09:05

## 2020-03-11 RX ADMIN — Medication SCH MG: at 09:05

## 2020-03-11 RX ADMIN — NIFEDIPINE SCH MG: 30 TABLET, EXTENDED RELEASE ORAL at 09:04

## 2020-03-11 RX ADMIN — MULTIVITAMIN TABLET SCH TAB: TABLET at 09:04

## 2020-03-11 RX ADMIN — PANTOPRAZOLE SODIUM SCH MG: 40 INJECTION, POWDER, FOR SOLUTION INTRAVENOUS at 21:56

## 2020-03-11 RX ADMIN — SODIUM CHLORIDE, POTASSIUM CHLORIDE, SODIUM LACTATE AND CALCIUM CHLORIDE SCH MLS/HR: 600; 310; 30; 20 INJECTION, SOLUTION INTRAVENOUS at 12:12

## 2020-03-11 RX ADMIN — BUSPIRONE HYDROCHLORIDE SCH MG: 10 TABLET ORAL at 09:05

## 2020-03-11 RX ADMIN — CARVEDILOL SCH MG: 6.25 TABLET, FILM COATED ORAL at 09:05

## 2020-03-11 RX ADMIN — METOPROLOL TARTRATE PRN MG: 5 INJECTION, SOLUTION INTRAVENOUS at 21:55

## 2020-03-11 RX ADMIN — PANTOPRAZOLE SODIUM SCH MG: 40 INJECTION, POWDER, FOR SOLUTION INTRAVENOUS at 09:05

## 2020-03-11 RX ADMIN — HEPARIN SODIUM SCH UNIT: 5000 INJECTION, SOLUTION INTRAVENOUS; SUBCUTANEOUS at 21:56

## 2020-03-11 RX ADMIN — INSULIN ASPART SCH: 100 INJECTION, SOLUTION INTRAVENOUS; SUBCUTANEOUS at 17:15

## 2020-03-11 RX ADMIN — SODIUM CHLORIDE, POTASSIUM CHLORIDE, SODIUM LACTATE AND CALCIUM CHLORIDE SCH MLS/HR: 600; 310; 30; 20 INJECTION, SOLUTION INTRAVENOUS at 19:00

## 2020-03-11 NOTE — PN
Physical Exam: 


SUBJECTIVE: Patient seen and examined. Patient still complaining of abdominal 

pain. Repeatedly asking for toradol despite being told that we will not give him

toradol due to concern for PUD. Otherwise denies chest pain, SOB, cough, fever, 

diarrhea. Patient states his Metformin was discontinued because his sugars were 

under control. 








OBJECTIVE:





                                   Vital Signs











 Period  Temp  Pulse  Resp  BP Sys/Goldstein  Pulse Ox


 


 Last 24 Hr  98.0 F-99.1 F  74-92  18-18  127-145/75-90  98











GENERAL: The patient is awake, alert, and fully oriented, in no acute distress.


HEAD: Normal with no signs of trauma.


EYES: EOMI


ENT: MMM


NECK: Trachea midline, full range of motion, supple. 


LUNGS: Breath sounds equal, clear to auscultation bilaterally, no wheezes, no 

crackles, no accessory muscle use. 


HEART: Regular rate and rhythm, S1, S2 without murmur, rub or gallop.


ABDOMEN: Soft, mild diffuse tenderness to palpation mainly over epigastrum, 

nondistended, normoactive bowel sounds, no guarding


EXTREMITIES: 2+ pulses, warm, well-perfused, no edema. 


NEUROLOGICAL: Cranial nerves II through XII grossly intact. Normal speech, gait 

not observed.


PSYCH: Normal mood, normal affect.


SKIN: Warm, dry, normal turgor, no rashes or lesions noted














                         Laboratory Results - last 24 hr











  03/11/20





  07:20


 


Sodium  132 L


 


Potassium  4.1


 


Chloride  98


 


Carbon Dioxide  28


 


Anion Gap  7 L


 


BUN  12.5


 


Creatinine  1.5 H


 


Est GFR (CKD-EPI)AfAm  56.61


 


Est GFR (CKD-EPI)NonAf  48.84


 


Random Glucose  140 H


 


Calcium  8.6


 


Total Bilirubin  0.5


 


AST  12 L


 


ALT  15


 


Alkaline Phosphatase  48


 


Total Protein  6.6


 


Albumin  3.0 L








Active Medications











Generic Name Dose Route Start Last Admin





  Trade Name Freq  PRN Reason Stop Dose Admin


 


Acetaminophen  650 mg  03/10/20 08:54 





  Tylenol -  PO  





  Q6H PRN  





  PAIN  


 


Buspirone HCl  10 mg  03/09/20 22:00  03/11/20 09:05





  Buspar -  PO   10 mg





  BID SILVANA   Administration


 


Carvedilol  6.25 mg  03/10/20 22:00  03/11/20 09:05





  Coreg -  PO   6.25 mg





  BID SILVANA   Administration


 


Folic Acid  1 mg  03/09/20 10:00  03/11/20 09:05





  Folic Acid -  PO   1 mg





  DAILY SILVANA   Administration


 


Heparin Sodium (Porcine)  5,000 unit  03/11/20 22:00 





  Heparin -  SQ  





  TID SILVANA  


 


Lactated Ringer's  1,000 ml in 1,000 mls @ 500 mls/hr  03/11/20 15:00 





  Lactated Ringers Solution  IV  





  ASDIR SILVANA  


 


Lactated Ringer's  1,000 mls @ 150 mls/hr  03/11/20 15:33 





  Lactated Ringers Solution  IV  





  ASDIR SILVANA  


 


Insulin Aspart  1 vial  03/11/20 16:30 





  Novolog Vial Sliding Scale -  SQ  





  BIDAC [facility]  





  Protocol  


 


Morphine Sulfate  3 mg  03/11/20 15:37 





  Morphine Injection -  IVPUSH  





  Q6H PRN  





  PAIN LEVEL 6-10  


 


Multivitamins/Minerals/Vitamin C  1 tab  03/09/20 10:00  03/11/20 09:04





  Tab-A-Vit -  PO   1 tab





  DAILY SILVANA   Administration


 


Nifedipine  30 mg  03/09/20 19:30  03/11/20 09:04





  Procardia Xl -  PO   30 mg





  DAILY SILVANA   Administration


 


Pantoprazole Sodium  40 mg  03/09/20 14:00  03/11/20 09:05





  Protonix Iv  IVPUSH   40 mg





  BID SILVANA   Administration


 


Thiamine HCl  100 mg  03/09/20 10:00  03/11/20 09:05





  Vitamin B1 -  PO   100 mg





  DAILY SILVANA   Administration


 


Trazodone HCl  300 mg  03/09/20 22:00  03/10/20 21:35





  Desyrel -  PO   300 mg





  HS SILVANA   Administration











ASSESSMENT/PLAN:


63 y/o/m with PMHx of Chronic pancreatitis, HTN, depression, lower GI bleed, 

PTSD, TBI  presenting today with abdominal pain likely 2/2 to gastroenteritis. 





#Abdominal pain 2/2 to gastroenteritis


- Patient had similar episode of pain with relation to food earlier this week


- CTA/P notes chronic pancreatitis, pancreatic pseudocyst


- GI consulted - Dr. Ledezma - no acute intervention needed - [facility] 

[facility] 


- GI asked to see patient again due to continued abd pain - further review of 

imaging showed evidence of acute on chronic pancreatitis 


- Agressive IVF


- Tylenol 650mg PO Q6H


- No urgent need for antibiotics, will follow GI recommendations. 


- Protonix 40mg IV BID 


- close follow up, will need repeat CT (pancreatic protocol) or MRI/MRCP to re-

evaluate likely in 4-6 weeks





#SUE 


- Likely prerenal due to volume loss and dehydration


- Renal U/S - no significant abnormalities ordered 


- monitor BUN/Cr





#Hx of Alcohol abuse


-Thiamine, folate, Multivitamin





#DM


- A1c - 7.6


- Patient was previously on Metformin, was discontinued because his sugars were 

under control as per patient 


- ISS





#HTN


- Continue Nifedipine and Coreg 





#Prophylaxis


- SCDs





#FEN


- LR @ 150mls/hr


- Monitor and replete lytes as needed


- full liquid diabetic diet 





#Disposition


- D/C pending clinical improvement 


- unable to fully confirm patient's medications as patient has not picked up 

medications from [facility] recently and [facility] proving diffucult to reach 





Visit type





- Emergency Visit


Emergency Visit: Yes


ED Registration Date: 03/09/20


Care time: The patient presented to the Emergency Department on the above date 

and was hospitalized for further evaluation of their emergent condition.





- New Patient


This patient is new to me today: No





- Critical Care


Critical Care patient: No





ATTENDING PHYSICIAN STATEMENT





I saw and evaluated the patient.


I reviewed the resident's note and discussed the case with the resident.


I agree with the resident's findings and plan as documented.








SUBJECTIVE:








OBJECTIVE:








ASSESSMENT AND PLAN:

## 2020-03-11 NOTE — PN.GI
GI Progress Note


Subjective: 





Asked to re-evaluate for persisting abdominal pain.


Pt seem ambulating in hallways, reporting persisting upper abdominal pain, 

worsening when eating/drinking, had cream of wheat and noted increased pain. 

Denies nausea, vomiting or fever/chills. Had some back pain though this has 

improved, denies worsening symptoms during hospitalization. Moving bowels, no 

blood reported. States last drink was during thanksgiving.





- Objective


Vital Signs: 


                                   Vital Signs











Temperature  98.7 F   03/11/20 06:00


 


Pulse Rate  75   03/11/20 06:00


 


Respiratory Rate  18   03/11/20 06:00


 


Blood Pressure  142/81   03/11/20 06:00


 


O2 Sat by Pulse Oximetry (%)  98   03/10/20 21:00











Constitutional: No Distress, Calm


Cardiovascular: Yes: WNL, Regular Rate and Rhythm


Respiratory: Yes: WNL, Regular, CTA Bilaterally


...Palpate: Yes: Other (Abd soft, tender on palpation in epigastrium and perim

bilical region)


Labs: 


                                    CBC, BMP





                                 03/09/20 06:32 





                                 03/11/20 07:20 











Problem List





- Problems


(1) Abdominal pain


Assessment/Plan: 


63yo male h/o etoh abuse, chronic pancreatitis presenting with abdominal pain, 

mostly epigastric and periumbilical region, worsening with food. No 

nausea/vomiting. CT abd/pelvis revealing changes of chronic pancreatitis with 

~6cm pseudocyst increased in size from prior imaging in 6/2019. No gallstones 

seen. Unclear of interval imaging at outside facility ([facility]).





-Discussed CT findings with radiologist, Dr. Rojo, revealing some stranding 

suggestive of acute on chronic pancreatitis, with pseudocyst formation, also 

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 

appearing). 


-Recommend NPO


-Aggressive fluid hydration, could increase to 150-200cc/hr as tolerated


-Check urine ethyl glucuronide to assess for recent etoh consumption


-Check Tgs and IGG4 


-Continue PPI


-Hematology consultation in view of SVT/SMV/PVT


-Recommend obtain outside records from [facility] from GI Dr. Lopez to compare and 

determine if interval change in size of collection, though based on current 

symptoms not likely to require urgent intervention


-Pending course pt would require close follow up with repeat CT (pancreatic 

protocol) or MRI/MRCP to re-evaluate likely in 4-6 weeks


Discussed with Dr. Meléndez


Code(s): R10.9 - UNSPECIFIED ABDOMINAL PAIN

## 2020-03-11 NOTE — PN
Teaching Attending Note


Name of Resident: oDn Romero





ATTENDING PHYSICIAN STATEMENT





I saw and evaluated the patient.


I reviewed the resident's note and discussed the case with the resident.


I agree with the resident's findings and plan as documented.








SUBJECTIVE: Epigastric abdominal pain persisting, preventing him from eating. No

vomiting. No diarrhea/melena/hematochezia. No fever/chills. 





OBJECTIVE: Afebrile, Hemodynamically Stable.





                                Last Vital Signs











Temp Pulse Resp BP Pulse Ox


 


 98.7 F   92 H  18   138/90   98 


 


 03/11/20 06:00  03/11/20 10:00  03/11/20 10:00  03/11/20 10:00  03/10/20 21:00











Heart - S1, S2, RRR


Lungs - clear to auscultation


Abdomen - Soft, epigastric tenderness. Bowel Sounds normal.


Extremities - no edema, no calf tenderness.





                         Laboratory Results - last 24 hr











  03/11/20





  07:20


 


Sodium  132 L


 


Potassium  4.1


 


Chloride  98


 


Carbon Dioxide  28


 


Anion Gap  7 L


 


BUN  12.5


 


Creatinine  1.5 H


 


Est GFR (CKD-EPI)AfAm  56.61


 


Est GFR (CKD-EPI)NonAf  48.84


 


Random Glucose  140 H


 


Calcium  8.6


 


Total Bilirubin  0.5


 


AST  12 L


 


ALT  15


 


Alkaline Phosphatase  48


 


Total Protein  6.6


 


Albumin  3.0 L








                               Current Medications











Generic Name Dose Route Start Last Admin





  Trade Name Freq  PRN Reason Stop Dose Admin


 


Acetaminophen  650 mg  03/10/20 08:54 





  Tylenol -  PO  





  Q6H PRN  





  PAIN  


 


Buspirone HCl  10 mg  03/09/20 22:00  03/11/20 09:05





  Buspar -  PO   10 mg





  BID SILVANA   Administration


 


Carvedilol  6.25 mg  03/10/20 22:00  03/11/20 09:05





  Coreg -  PO   6.25 mg





  BID SILVANA   Administration


 


Folic Acid  1 mg  03/09/20 10:00  03/11/20 09:05





  Folic Acid -  PO   1 mg





  DAILY SILVANA   Administration


 


Lactated Ringer's  1,000 mls @ 125 mls/hr  03/11/20 14:49 





  Lactated Ringers Solution  IV  





  ASDIR SILVANA  


 


Insulin Aspart  1 vial  03/11/20 16:30 





  Novolog Vial Sliding Scale -  SQ  





  BIDAC SILVANA  





  Protocol  


 


Multivitamins/Minerals/Vitamin C  1 tab  03/09/20 10:00  03/11/20 09:04





  Tab-A-Vit -  PO   1 tab





  DAILY SILVANA   Administration


 


Nifedipine  30 mg  03/09/20 19:30  03/11/20 09:04





  Procardia Xl -  PO   30 mg





  DAILY SILVANA   Administration


 


Pantoprazole Sodium  40 mg  03/09/20 14:00  03/11/20 09:05





  Protonix Iv  IVPUSH   40 mg





  BID SILVANA   Administration


 


Thiamine HCl  100 mg  03/09/20 10:00  03/11/20 09:05





  Vitamin B1 -  PO   100 mg





  DAILY SILVANA   Administration


 


Trazodone HCl  300 mg  03/09/20 22:00  03/10/20 21:35





  Desyrel -  PO   300 mg





  HS SILVANA   Administration








                                Home Medications











 Medication  Instructions  Recorded


 


Buspirone HCl [Buspar -] 10 mg PO BID 06/08/19


 


Coreg  06/08/19


 


Nifedipine ER [Procardia XL -] 30 mg PO DAILY 06/08/19


 


Trazodone HCl 300 mg PO HS 06/08/19


 


Bupropion HCl [Bupropion HCl ER] 200 mg PO BID 06/10/19


 


Pantoprazole Sodium 40 mg PO BID #60 tablet. 06/13/19














ASSESSMENT/PLAN: 


  


64 year old male with history of Depression, Alcohol abuse, admitted with 

abdominal pain. 





CT Abdomen - unremarkable except for chronic pancreatitis and pancreatic 

pseudocyst. 





1. Acute Gastroenteritis


Ongoing abdominal discomfort ? EGD to exclude PUD


GI contacted for further recommendations.


PPI BID





2. SUE - Creat 1.5. Likely pre-renal.


Renal US negative.


Intensify fluid regimen.


Will consult Nephrology if no improvement in renal function.





3. Chronic Pancreatitis with Pancreatic Pseudocyst


No urgent intervention required as per GI. Re-contacted for re-evaluation given 

ongoing pain and poor oral intake. 


MRI with contrast once renal function improves. 





4. Depression - continue Buspirone, Trazodone, Bupropion.





5. HTN - continue Coreg, Nifedipine. 





6. Hx Alcohol Abuse - no evidence of withdrawal


MVI, Thiamine, Folate.





DVT Px - Heparin SQ


GI Px - PPI

## 2020-03-12 LAB
ANION GAP SERPL CALC-SCNC: 10 MMOL/L (ref 8–16)
BUN SERPL-MCNC: 10.9 MG/DL (ref 7–18)
CALCIUM SERPL-MCNC: 9.1 MG/DL (ref 8.5–10.1)
CHLORIDE SERPL-SCNC: 97 MMOL/L (ref 98–107)
CO2 SERPL-SCNC: 27 MMOL/L (ref 21–32)
CREAT SERPL-MCNC: 1.2 MG/DL (ref 0.55–1.3)
GLUCOSE SERPL-MCNC: 118 MG/DL (ref 74–106)
POTASSIUM SERPLBLD-SCNC: 4.2 MMOL/L (ref 3.5–5.1)
SODIUM SERPL-SCNC: 134 MMOL/L (ref 136–145)
TRIGL SERPL-MCNC: 66 MG/DL (ref 0–150)

## 2020-03-12 RX ADMIN — INSULIN ASPART SCH: 100 INJECTION, SOLUTION INTRAVENOUS; SUBCUTANEOUS at 06:47

## 2020-03-12 RX ADMIN — PANTOPRAZOLE SODIUM SCH MG: 40 INJECTION, POWDER, FOR SOLUTION INTRAVENOUS at 21:15

## 2020-03-12 RX ADMIN — HEPARIN SODIUM SCH UNIT: 5000 INJECTION, SOLUTION INTRAVENOUS; SUBCUTANEOUS at 21:15

## 2020-03-12 RX ADMIN — INSULIN ASPART SCH: 100 INJECTION, SOLUTION INTRAVENOUS; SUBCUTANEOUS at 18:24

## 2020-03-12 RX ADMIN — HEPARIN SODIUM SCH UNIT: 5000 INJECTION, SOLUTION INTRAVENOUS; SUBCUTANEOUS at 15:15

## 2020-03-12 RX ADMIN — SODIUM CHLORIDE, POTASSIUM CHLORIDE, SODIUM LACTATE AND CALCIUM CHLORIDE SCH MLS/HR: 600; 310; 30; 20 INJECTION, SOLUTION INTRAVENOUS at 18:23

## 2020-03-12 RX ADMIN — METOPROLOL TARTRATE PRN MG: 5 INJECTION, SOLUTION INTRAVENOUS at 06:24

## 2020-03-12 RX ADMIN — PANTOPRAZOLE SODIUM SCH MG: 40 INJECTION, POWDER, FOR SOLUTION INTRAVENOUS at 11:36

## 2020-03-12 RX ADMIN — HEPARIN SODIUM SCH UNIT: 5000 INJECTION, SOLUTION INTRAVENOUS; SUBCUTANEOUS at 06:25

## 2020-03-12 RX ADMIN — SODIUM CHLORIDE, POTASSIUM CHLORIDE, SODIUM LACTATE AND CALCIUM CHLORIDE SCH MLS/HR: 600; 310; 30; 20 INJECTION, SOLUTION INTRAVENOUS at 06:39

## 2020-03-12 NOTE — PN.GI
GI Progress Note


Subjective: 





Communicated with medical intern: 12/19: [facility] revealing 2cm

pancreatic cyst.  CT scan from [facility] 6/19 did not show cyst.  Now has 6cm   





- Objective


Vital Signs: 


                                   Vital Signs











Temperature  98.0 F   03/12/20 06:00


 


Pulse Rate  91 H  03/12/20 06:00


 


Respiratory Rate  18   03/12/20 06:00


 


Blood Pressure  141/93   03/12/20 06:00


 


O2 Sat by Pulse Oximetry (%)  97   03/11/20 21:00











Constitutional: Calm


Eyes: No: Sclera Icterus


Cardiovascular: Yes: Regular Rate and Rhythm


Respiratory: Yes: CTA Bilaterally


Gastrointestinal Inspection: No: Distention


...Auscultate: Yes: Normoactive Bowel Sounds


...Palpate: Yes: Soft, Tenderness (Mild TTP upper abdomen).  No: Guarding


...Percussion: No: Tympanitic


Edema: No (No LE edema)


Neurological: Yes: Alert


Labs: 


               CBC, BMP





                                 03/09/20 06:32 





                                 03/12/20 07:50 





                                  Hepatic Panel











Total Bilirubin  0.5 mg/dL (0.2-1)   03/11/20  07:20    


 


Direct Bilirubin  0.2 mg/dL (0.0-0.2)   03/09/20  06:32    


 


AST  12 U/L (15-37)  L  03/11/20  07:20    


 


ALT  15 U/L (13-61)   03/11/20  07:20    


 


Alkaline Phosphatase  48 U/L ()   03/11/20  07:20    


 


Albumin  3.0 g/dl (3.4-5.0)  L  03/11/20  07:20    














Problem List





- Problems


(1) Pancreatic pseudocyst


Assessment/Plan: 


Increased in size from 12/20. DOes admit to intermittently drinking alcohol.  No

early ssatiety or vomiting and states that he is overall improving clinically.  

If continued improvement, advance to clears.  Will need close follow-up with [facility]. If still dependent on opiate analgesia, should arrange 

[facility] as he will likely need to be assessed for cyst gastrostomy via 

EUS, further eval of pancreatic findings


Advised need for complete alsohol cessation


Knows of diagnosis of esophageal varcies.  States his gastroenterologist put him

on coreg for this reason


Ordered MRI of abdomen with and without contrast with MRCP.   


Code(s): K86.3 - PSEUDOCYST OF PANCREAS

## 2020-03-12 NOTE — PN
Physical Exam: 


SUBJECTIVE: Patient seen and examined. Patient still complaining of some pain in

his abd but states it is improving. 








OBJECTIVE:





                                   Vital Signs











 Period  Temp  Pulse  Resp  BP Sys/Goldstein  Pulse Ox


 


 Last 24 Hr  98.0 F-98.3 F  79-98  18-18  139-145/86-93  97











GENERAL: The patient is awake, alert, and fully oriented, in no acute distress.


HEAD: Normal with no signs of trauma.


EYES: EOMI


ENT: MMM


NECK: Trachea midline, full range of motion, supple. 


LUNGS: Breath sounds equal, clear to auscultation bilaterally, no wheezes, no 

crackles, no accessory muscle use. 


HEART: Regular rate and rhythm, S1, S2 without murmur, rub or gallop.


ABDOMEN: Soft, mild diffuse tenderness to palpation mainly over epigastrum, 

nondistended, normoactive bowel sounds, no guarding


EXTREMITIES: 2+ pulses, warm, well-perfused, no edema. 


NEUROLOGICAL: Cranial nerves II through XII grossly intact. Normal speech, gait 

not observed.


PSYCH: Normal mood, normal affect.


SKIN: Warm, dry, normal turgor, no rashes or lesions noted








                         Laboratory Results - last 24 hr











  03/11/20 03/12/20 03/12/20





  17:14 06:44 07:50


 


Sodium    134 L


 


Potassium    4.2


 


Chloride    97 L


 


Carbon Dioxide    27


 


Anion Gap    10


 


BUN    10.9


 


Creatinine    1.2


 


Est GFR (CKD-EPI)AfAm    74.14


 


Est GFR (CKD-EPI)NonAf    63.97


 


POC Glucometer  124  139 


 


Random Glucose    118 H


 


Calcium    9.1


 


Triglycerides    66








Active Medications











Generic Name Dose Route Start Last Admin





  Trade Name Freq  PRN Reason Stop Dose Admin


 


Acetaminophen  650 mg  03/10/20 08:54 





  Tylenol -  PO  





  Q6H PRN  





  PAIN  


 


Buspirone HCl  10 mg  03/09/20 22:00  03/11/20 09:05





  Buspar -  PO   10 mg





  BID SILVANA   Administration


 


Carvedilol  6.25 mg  03/10/20 22:00  03/11/20 09:05





  Coreg -  PO   6.25 mg





  BID SILVANA   Administration


 


Folic Acid  1 mg  03/09/20 10:00  03/11/20 09:05





  Folic Acid -  PO   1 mg





  DAILY SILVANA   Administration


 


Heparin Sodium (Porcine)  5,000 unit  03/11/20 22:00  03/12/20 06:25





  Heparin -  SQ   5,000 unit





  TID SILVANA   Administration


 


Lactated Ringer's  1,000 mls @ 150 mls/hr  03/11/20 16:45  03/12/20 06:39





  Lactated Ringers Solution  IV   150 mls/hr





  ASDIR [facility]NA   Administration


 


Insulin Aspart  1 vial  03/11/20 16:30  03/12/20 06:47





  Novolog Vial Sliding Scale -  SQ   Not Given





  BIDAC [facility]  





  Protocol  


 


Metoprolol Tartrate  5 mg  03/11/20 21:38  03/12/20 06:24





  Lopressor Injection -  IVPB   5 mg





  Q8H PRN   Administration





  HYPERTENSION  


 


Morphine Sulfate  3 mg  03/11/20 15:37  03/12/20 06:24





  Morphine Sulfate  IVPUSH   3 mg





  Q6H PRN   Administration





  PAIN LEVEL 6-10  


 


Multivitamins/Minerals/Vitamin C  1 tab  03/09/20 10:00  03/11/20 09:04





  Tab-A-Vit -  PO   1 tab





  DAILY SILVANA   Administration


 


Nifedipine  30 mg  03/09/20 19:30  03/11/20 09:04





  Procardia Xl -  PO   30 mg





  DAILY SILVANA   Administration


 


Pantoprazole Sodium  40 mg  03/09/20 14:00  03/12/20 11:36





  Protonix Iv  IVPUSH   40 mg





  BID [facility]   Administration


 


Thiamine HCl  100 mg  03/09/20 10:00  03/11/20 09:05





  Vitamin B1 -  PO   100 mg





  DAILY SILVANA   Administration


 


Trazodone HCl  300 mg  03/09/20 22:00  03/10/20 21:35





  Desyrel -  PO   300 mg





  HS SILVANA   Administration











ASSESSMENT/PLAN:


63 y/o/m with PMHx of Chronic pancreatitis, HTN, depression, lower GI bleed, 

PTSD, TBI  presenting today with abdominal pain likely 2/2 to gastroenteritis. 





#Abdominal pain 2/2 to gastroenteritis


- Patient had similar episode of pain with relation to food earlier this week


- CT A/P notes chronic pancreatitis, pancreatic pseudocyst


- GI consulted - Dr. Ledezma - no acute intervention needed - follows 

[facility] 


   - Spoke with [facility]. Lopez, [facility]. Patient had a EGD and Colonoscopy on 

12/10/19. Colonoscopy showed diverticolosis, recommended 5 year follow up. EGD 

showed esophageal varices without signs of bleeding. Also reviewed CT A/P scan 

from [facility] which measured a pancreatic pseudocyst at 2.2cm 

(compared to >6cm on scan here)


- GI asked to see patient again due to continued abd pain - further review of 

imaging showed evidence of acute on chronic pancreatitis 


- Agressive IVF


- Tylenol 650mg PO Q6H


- No urgent need for antibiotics, will follow GI recommendations. 


- Protonix 40mg IV BID 


- close follow up, will need repeat CT (pancreatic protocol) or MRI/MRCP to re-

evaluate likely in 4-6 weeks


- Splenic vein thrombosis mentioned on CT A/P. This was present on scan CT A/P 

scan on 12/11/19 and is chronic 


   - Heme/Onc consulted 





#SUE - resolved 


- Likely prerenal due to volume loss and dehydration


- Renal U/S - no significant abnormalities ordered 


- Cr within normal limits now 





#Hx of Alcohol abuse


-Thiamine, folate, Multivitamin





#DM


- A1c - 7.6


- Patient was previously on Metformin, was discontinued because his sugars were 

under control as per patient 


- ISS





#HTN


- Continue Nifedipine and Coreg 





#Prophylaxis


- SCDs





#FEN


- LR @ 150mls/hr


- Monitor and replete lytes as needed


- NPO except for meds 





#Disposition


- D/C pending clinical improvement, will need GI follow up after discharge 





Visit type





- Emergency Visit


Emergency Visit: Yes


ED Registration Date: 03/09/20


Care time: The patient presented to the Emergency Department on the above date 

and was hospitalized for further evaluation of their emergent condition.





- New Patient


This patient is new to me today: No





- Critical Care


Critical Care patient: No





ATTENDING PHYSICIAN STATEMENT





I saw and evaluated the patient.


I reviewed the resident's note and discussed the case with the resident.


I agree with the resident's findings and plan as documented.








SUBJECTIVE:








OBJECTIVE:








ASSESSMENT AND PLAN:

## 2020-03-12 NOTE — CONSULT
Consultation: 


Heme-onc resident note


REQUESTING PROVIDER: Dr OLIVERIO Saavedra





CONSULT REQUEST: We have been asked to medically evaluate this patient for SMV 

thrombosis





HISTORY OF PRESENT ILLNESS:





63 y/o M with PMH chronic pancreatitis, HTN, depression, LGIB, PTSD, TBI who 

initially presented for b/l flank pain and has been tx for gastroenteritis. 

However, on CTAP, pt was noted to have multiple pancreatic calcifications, 

thick-walled cystic lesion inferior to the pancreatic head measuring 

6.4x6.3x6.1cm suggestive of pseudocyst. Pancreatic duct dilated to 14mm, and 

with atrophy of body and tail. Per primary team, lesion was 2.2cm in Dec 2019. 

Was also noted to have possible splenic v. thrombosis on imaging, and per IR. Pt

has a hx of esophageal varices, however states he has never had banding, or 

other tx, and has never had bleeding from varices. Pt currently complaining of 

bloating, otherwise without pain. Denies HA, fever, chills, chest pain or 

pressure or changes in urinary or bowel function. We are consulted for SMV 

thrombosis management. 





Pt denies any past hx of clotting d/o, or familial d/o. 





PMH: as above


Psxh: L hip replacement


meds: as in chart


allergies: lisinopril


FH: htn, aneurysms - maternal side, htn - paternal side


SH: smoked in past, quit in 1980's. used to smoke <1/2 ppd per pt. used to drink

significant amounts ("gallon")


still drinks but states he drinks 1x/month, 1 pint of liquor. concerning drug 

use "that was a long time ago"





PHYSICAL EXAMINATION





                               Vital Signs - 24 hr














  03/11/20 03/12/20 03/12/20





  21:55 00:00 06:00


 


Temperature  98.0 F 98.0 F


 


Pulse Rate 98 H 96 H 91 H


 


Respiratory  18 18





Rate   


 


Blood Pressure 143/92 143/92 141/93


 


O2 Sat by Pulse   





Oximetry (%)   











general: resting in bed, in NAD


HEENT: NCAT


neck: supple, no cervical LOLY


cardio: S1, S2 RRR. no r/m/g


pulm: CTA b/l. no accessory m usage


abdomen: +diffusely ttp in all quadrants, no distension. 


LE: 2+ pulses, no edema


neuro: Cns 2-12 grossly intact. good ROM in UE, LE.








                         Laboratory Results - last 24 hr











  03/11/20 03/12/20 03/12/20





  17:14 06:44 07:50


 


Sodium    134 L


 


Potassium    4.2


 


Chloride    97 L


 


Carbon Dioxide    27


 


Anion Gap    10


 


BUN    10.9


 


Creatinine    1.2


 


Est GFR (CKD-EPI)AfAm    74.14


 


Est GFR (CKD-EPI)NonAf    63.97


 


POC Glucometer  124  139 


 


Random Glucose    118 H


 


Calcium    9.1


 


Triglycerides    66








Active Medications











Generic Name Dose Route Start Last Admin





  Trade Name Freq  PRN Reason Stop Dose Admin


 


Acetaminophen  650 mg  03/10/20 08:54 





  Tylenol -  PO  





  Q6H PRN  





  PAIN  


 


Buspirone HCl  10 mg  03/09/20 22:00  03/11/20 09:05





  Buspar -  PO   10 mg





  BID SILVANA   Administration


 


Carvedilol  6.25 mg  03/10/20 22:00  03/11/20 09:05





  Coreg -  PO   6.25 mg





  BID SILVANA   Administration


 


Folic Acid  1 mg  03/09/20 10:00  03/11/20 09:05





  Folic Acid -  PO   1 mg





  DAILY SILVANA   Administration


 


Heparin Sodium (Porcine)  5,000 unit  03/11/20 22:00  03/12/20 06:25





  Heparin -  SQ   5,000 unit





  TID [facility]   Administration


 


Lactated Ringer's  1,000 mls @ 150 mls/hr  03/11/20 16:45  03/12/20 06:39





  Lactated Ringers Solution  IV   150 mls/hr





  ASDIR [facility]   Administration


 


Insulin Aspart  1 vial  03/11/20 16:30  03/12/20 06:47





  Novolog Vial Sliding Scale -  SQ   Not Given





  BIDAC [facility]  





  Protocol  


 


Metoprolol Tartrate  5 mg  03/11/20 21:38  03/12/20 06:24





  Lopressor Injection -  IVPB   5 mg





  Q8H PRN   Administration





  HYPERTENSION  


 


Morphine Sulfate  3 mg  03/11/20 15:37  03/12/20 12:32





  Morphine Sulfate  IVPUSH   3 mg





  Q6H PRN   Administration





  PAIN LEVEL 6-10  


 


Multivitamins/Minerals/Vitamin C  1 tab  03/09/20 10:00  03/11/20 09:04





  Tab-A-Vit -  PO   1 tab





  DAILY SILVANA   Administration


 


Nifedipine  30 mg  03/09/20 19:30  03/11/20 09:04





  Procardia Xl -  PO   30 mg





  DAILY SILVANA   Administration


 


Pantoprazole Sodium  40 mg  03/09/20 14:00  03/12/20 11:36





  Protonix Iv  IVPUSH   40 mg





  BID SILVANA   Administration


 


Thiamine HCl  100 mg  03/09/20 10:00  03/11/20 09:05





  Vitamin B1 -  PO   100 mg





  DAILY SILVANA   Administration


 


Trazodone HCl  300 mg  03/09/20 22:00  03/10/20 21:35





  Desyrel -  PO   300 mg





  HS SILVANA   Administration











ASSESSMENT/PLAN:


63 y/o M with PMH chronic pancreatitis, HTN, depression, LGIB, PTSD, TBI who 

initially presented for b/l flank pain and has been tx for gastroenteritis. 

However, on CTAP, pt was noted to have multiple pancreatic calcifications, 

thick-walled cystic lesion inferior to the pancreatic head measuring 

6.4x6.3x6.1cm suggestive of pseudocyst. Pancreatic duct dilated to 14mm, and 

with atrophy of body and tail. Per primary team, lesion was 2.2cm in Dec 2019. 

Was also noted to have possible splenic v. thrombosis on imaging.





#?chronic splenic v. thrombosis


#pancreatic pseudocyst


#acute on chronic pancreatitis likely 2/2 EtOH


#HTN


#PTSD


#Depression


#TBI





-pt with evidence of splenic v. thrombosis, which appears chronic


-also has hx of esophageal varices, but per pt, has never required banding or tx


-based on risks vs. benefits of a/c, while a/c would prevent extension of 

thrombus and recurrent thrombi, risk of variceal bleed is greater. also as pt is

still continuing to drink, would not recommend a/c at this time


-?r/o malignancy, however pseudocysts rarely represent CA. may require ?drainage

if painful or infected


-would order PT, PTT





plan discussed with attending, Dr. Herrera Del Cid MD PGY-3


Heme-onc team





Dispo: We will continue to follow the patient. Thank you for this consultative 

opportunity.











Visit type





- Emergency Visit


Emergency Visit: No





- New Patient


This patient is new to me today: Yes


Date on this admission: 03/12/20





- Critical Care


Critical Care patient: No

## 2020-03-12 NOTE — PN
Teaching Attending Note


Name of Resident: Marbella Del Cid





ATTENDING PHYSICIAN STATEMENT





I saw and evaluated the patient.


I reviewed the resident's note and discussed the case with the resident.


I agree with the resident's findings and plan as documented.








SUBJECTIVE: Doing well. Has abdominal pain but now is "less radiant" and 

focused. Still NPO. Mentioned he wants to eat








OBJECTIVE:





                                Last Vital Signs











Temp Pulse Resp BP Pulse Ox


 


 98.2 F   79   18   147/94   97 


 


 03/12/20 15:00  03/12/20 15:00  03/12/20 15:00  03/12/20 15:00 03/11/20 21:00











General: NAD


HEENT: MMM


CVS: S1, S2


Abdomen: Soft, + Tenderness (epigastrium)


Extremities: No edema


Neuro: Moves all extremities


Psych: Conversant.  Appropriate








ASSESSMENT AND PLAN:





63 y/o gentleman with PMH chronic pancreatitis, HTN, depression, LGIB, PTSD, TBI

who initially presented for b/l flank pain and has been tx for gastroenteritis. 

However, on CTAP, pt was noted to have multiple pancreatic calcifications, 

thick-walled cystic lesion inferior to the pancreatic head measuring 

6.4x6.3x6.1cm suggestive of pseudocyst. Pancreatic duct dilated to 14mm, and 

with atrophy of body and tail. Per primary team, lesion was 2.2cm in Dec 2019. 

Was also noted to have possible splenic v. thrombosis on imaging for which 

hematology has been consulted








Recommend:





1) Possible chronic splenic vein thrombosis. Given than risk of bleeding (due to

esophageal varices) exceeds benefit of anticoagulation at this time, would 

recommend to monitor carefully off anticoagulation


2) MR-V to determine if true splenic vein thrombosis can be considered 

outpatient.


3) If any symptoms of progressive thrombosis were to develop will re-assess need

for anticoagulation


4) Agree with pancreatic cyst evaluation with his [facility]


5) Rest per Dr. Del Cid's note


6) Thank you for this consultation

## 2020-03-12 NOTE — PN
Teaching Attending Note


Name of Resident: Don Romero





ATTENDING PHYSICIAN STATEMENT





I saw and evaluated the patient.


I reviewed the resident's note and discussed the case with the resident.


I agree with the resident's findings and plan as documented.








SUBJECTIVE: Epigastric abdominal pain persisting, preventing him from eating. 

Pain improved with IV morphine. No vomiting. No diarrhea/melena/hematochezia. No

fever/chills. 





OBJECTIVE: Afebrile, Hemodynamically Stable.





                                Last Vital Signs











Temp Pulse Resp BP Pulse Ox


 


 98.0 F   91 H  18   141/93   97 


 


 03/12/20 06:00  03/12/20 06:00  03/12/20 06:00  03/12/20 06:00  03/11/20 21:00








Heart - S1, S2, RRR


Lungs - clear to auscultation


Abdomen - Soft, epigastric tenderness. Bowel Sounds normal.


Extremities - no edema, no calf tenderness.





                         Laboratory Results - last 24 hr











  03/11/20 03/12/20 03/12/20





  17:14 06:44 07:50


 


Sodium    134 L


 


Potassium    4.2


 


Chloride    97 L


 


Carbon Dioxide    27


 


Anion Gap    10


 


BUN    10.9


 


Creatinine    1.2


 


Est GFR (CKD-EPI)AfAm    74.14


 


Est GFR (CKD-EPI)NonAf    63.97


 


POC Glucometer  124  139 


 


Random Glucose    118 H


 


Calcium    9.1


 


Triglycerides    66








                               Current Medications











Generic Name Dose Route Start Last Admin





  Trade Name Freq  PRN Reason Stop Dose Admin


 


Acetaminophen  650 mg  03/10/20 08:54 





  Tylenol -  PO  





  Q6H PRN  





  PAIN  


 


Buspirone HCl  10 mg  03/09/20 22:00  03/11/20 09:05





  Buspar -  PO   10 mg





  BID [facility]NA   Administration


 


Carvedilol  6.25 mg  03/10/20 22:00  03/11/20 09:05





  Coreg -  PO   6.25 mg





  BID SILVANA   Administration


 


Folic Acid  1 mg  03/09/20 10:00  03/11/20 09:05





  Folic Acid -  PO   1 mg





  DAILY SILVANA   Administration


 


Heparin Sodium (Porcine)  5,000 unit  03/11/20 22:00  03/12/20 06:25





  Heparin -  SQ   5,000 unit





  TID [facility]NA   Administration


 


Lactated Ringer's  1,000 mls @ 150 mls/hr  03/11/20 16:45  03/12/20 06:39





  Lactated Ringers Solution  IV   150 mls/hr





  ASDIR [facility]   Administration


 


Insulin Aspart  1 vial  03/11/20 16:30  03/12/20 06:47





  Novolog Vial Sliding Scale -  SQ   Not Given





  BIDAC [facility]  





  Protocol  


 


Metoprolol Tartrate  5 mg  03/11/20 21:38  03/12/20 06:24





  Lopressor Injection -  IVPB   5 mg





  Q8H PRN   Administration





  HYPERTENSION  


 


Morphine Sulfate  3 mg  03/11/20 15:37  03/12/20 06:24





  Morphine Sulfate  IVPUSH   3 mg





  Q6H PRN   Administration





  PAIN LEVEL 6-10  


 


Multivitamins/Minerals/Vitamin C  1 tab  03/09/20 10:00  03/11/20 09:04





  Tab-A-Vit -  PO   1 tab





  DAILY SILVANA   Administration


 


Nifedipine  30 mg  03/09/20 19:30  03/11/20 09:04





  Procardia Xl -  PO   30 mg





  DAILY SILVANA   Administration


 


Pantoprazole Sodium  40 mg  03/09/20 14:00  03/11/20 21:56





  Protonix Iv  IVPUSH   40 mg





  BID [facility]   Administration


 


Thiamine HCl  100 mg  03/09/20 10:00  03/11/20 09:05





  Vitamin B1 -  PO   100 mg





  DAILY S[facility]VANA   Administration


 


Trazodone HCl  300 mg  03/09/20 22:00  03/10/20 21:35





  Desyrel -  PO   300 mg





  HS [facility]   Administration








                                Home Medications











 Medication  Instructions  Recorded


 


Nifedipine ER [Procardia XL -] 30 mg PO DAILY 06/08/19


 


Trazodone HCl 300 mg PO HS 06/08/19


 


Carvedilol [Coreg] 6.25 mg PO BID 03/12/20

















ASSESSMENT/PLAN: 


  


64 year old male with history of Depression, Alcohol abuse, admitted with 

abdominal pain. 





CT Abdomen - revised verbal radiology reading - acute on chronic pancreatitis 

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 





1. Acute on Chronic Pancreatitis with Pancreatic Pseudocyst (larger compared to 

2cm [facility])


NPO/IV Fluids


EGD deferred by GI


Further management as per GI.


PPI BID. 





2. SUE - likely pre-renal, resolving with IV hydration.


Renal US negative.





3. Depression - continue Buspirone, Trazodone, Bupropion.





5. HTN - continue Coreg, Nifedipine. 





6. Hx Alcohol Abuse - no evidence of withdrawal


MVI, Thiamine, Folate.





7. 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

 imaging - appears chronic - was also present on last [facility] at [facility] 12[facility]. 

Hematology consulted. 





DVT Px - Heparin SQ


GI Px - PPI

## 2020-03-13 VITALS — SYSTOLIC BLOOD PRESSURE: 142 MMHG | DIASTOLIC BLOOD PRESSURE: 97 MMHG | TEMPERATURE: 98.2 F

## 2020-03-13 VITALS — HEART RATE: 87 BPM

## 2020-03-13 LAB
ALBUMIN SERPL-MCNC: 3.2 G/DL (ref 3.4–5)
ALP SERPL-CCNC: 51 U/L (ref 45–117)
ALT SERPL-CCNC: 15 U/L (ref 13–61)
ANION GAP SERPL CALC-SCNC: 11 MMOL/L (ref 8–16)
APTT BLD: 36.9 SECONDS (ref 25.2–36.5)
AST SERPL-CCNC: 12 U/L (ref 15–37)
BILIRUB SERPL-MCNC: 0.3 MG/DL (ref 0.2–1)
BUN SERPL-MCNC: 13 MG/DL (ref 7–18)
CALCIUM SERPL-MCNC: 9 MG/DL (ref 8.5–10.1)
CHLORIDE SERPL-SCNC: 98 MMOL/L (ref 98–107)
CO2 SERPL-SCNC: 25 MMOL/L (ref 21–32)
CREAT SERPL-MCNC: 1.3 MG/DL (ref 0.55–1.3)
DEPRECATED RDW RBC AUTO: 19.2 % (ref 11.9–15.9)
GLUCOSE SERPL-MCNC: 93 MG/DL (ref 74–106)
HCT VFR BLD CALC: 37.9 % (ref 35.4–49)
HGB BLD-MCNC: 12.9 GM/DL (ref 11.7–16.9)
INR BLD: 1.17 (ref 0.83–1.09)
MCH RBC QN AUTO: 28.2 PG (ref 25.7–33.7)
MCHC RBC AUTO-ENTMCNC: 33.9 G/DL (ref 32–35.9)
MCV RBC: 83.2 FL (ref 80–96)
PLATELET # BLD AUTO: 192 K/MM3 (ref 134–434)
PMV BLD: 7.9 FL (ref 7.5–11.1)
POTASSIUM SERPLBLD-SCNC: 4.1 MMOL/L (ref 3.5–5.1)
PROT SERPL-MCNC: 7 G/DL (ref 6.4–8.2)
PT PNL PPP: 13.8 SEC (ref 9.7–13)
RBC # BLD AUTO: 4.55 M/MM3 (ref 4–5.6)
SODIUM SERPL-SCNC: 133 MMOL/L (ref 136–145)
WBC # BLD AUTO: 3.7 K/MM3 (ref 4–10)

## 2020-03-13 RX ADMIN — HEPARIN SODIUM SCH UNIT: 5000 INJECTION, SOLUTION INTRAVENOUS; SUBCUTANEOUS at 05:55

## 2020-03-13 RX ADMIN — SODIUM CHLORIDE, POTASSIUM CHLORIDE, SODIUM LACTATE AND CALCIUM CHLORIDE SCH MLS/HR: 600; 310; 30; 20 INJECTION, SOLUTION INTRAVENOUS at 00:48

## 2020-03-13 RX ADMIN — SODIUM CHLORIDE, POTASSIUM CHLORIDE, SODIUM LACTATE AND CALCIUM CHLORIDE SCH: 600; 310; 30; 20 INJECTION, SOLUTION INTRAVENOUS at 17:25

## 2020-03-13 RX ADMIN — PANTOPRAZOLE SODIUM SCH MG: 40 INJECTION, POWDER, FOR SOLUTION INTRAVENOUS at 10:46

## 2020-03-13 RX ADMIN — INSULIN ASPART SCH: 100 INJECTION, SOLUTION INTRAVENOUS; SUBCUTANEOUS at 06:01

## 2020-03-13 RX ADMIN — INSULIN ASPART SCH: 100 INJECTION, SOLUTION INTRAVENOUS; SUBCUTANEOUS at 17:25

## 2020-03-13 RX ADMIN — SODIUM CHLORIDE, POTASSIUM CHLORIDE, SODIUM LACTATE AND CALCIUM CHLORIDE SCH MLS/HR: 600; 310; 30; 20 INJECTION, SOLUTION INTRAVENOUS at 08:00

## 2020-03-13 RX ADMIN — HEPARIN SODIUM SCH UNIT: 5000 INJECTION, SOLUTION INTRAVENOUS; SUBCUTANEOUS at 13:33

## 2020-03-13 NOTE — DS
Physical Exam: 


SUBJECTIVE: Patient seen and examined. Still complaining of abd pain and 

requesting pain medication. 








OBJECTIVE:





                                   Vital Signs











 Period  Temp  Pulse  Resp  BP Sys/Goldstein  Pulse Ox


 


 Last 24 Hr  97.9 F-98.8 F    18-18  147-158/78-94  








PHYSICAL EXAM





GENERAL: The patient is awake, alert, and fully oriented, in no acute distress.


HEAD: Normal with no signs of trauma.


EYES: EOMI


ENT: MMM


NECK: Trachea midline, full range of motion, supple. 


LUNGS: Breath sounds equal, clear to auscultation bilaterally, no wheezes, no 

crackles, no accessory muscle use. 


HEART: Regular rate and rhythm, S1, S2 without murmur, rub or gallop.


ABDOMEN: Soft, mild diffuse tenderness to palpation mainly over epigastrum, 

nondistended, normoactive bowel sounds, no guarding


EXTREMITIES: 2+ pulses, warm, well-perfused, no edema. 


NEUROLOGICAL: Cranial nerves II through XII grossly intact. Normal speech, gait 

not observed.


PSYCH: Normal mood, normal affect.


SKIN: Warm, dry, normal turgor, no rashes or lesions noted





LABS


                         Laboratory Results - last 24 hr











  03/11/20 03/12/20 03/13/20





  18:20 12:15 05:50


 


WBC   


 


RBC   


 


Hgb   


 


Hct   


 


MCV   


 


MCH   


 


MCHC   


 


RDW   


 


Plt Count   


 


MPV   


 


PT with INR   


 


INR   


 


PTT (Actin FS)   


 


Sodium   


 


Potassium   


 


Chloride   


 


Carbon Dioxide   


 


Anion Gap   


 


BUN   


 


Creatinine   


 


Est GFR (CKD-EPI)AfAm   


 


Est GFR (CKD-EPI)NonAf   


 


POC Glucometer    98


 


Random Glucose   


 


Calcium   


 


Total Bilirubin   


 


AST   


 


ALT   


 


Alkaline Phosphatase   


 


Total Protein   


 


Albumin   


 


U Ethyl Glucuronide  Negative  


 


IgG   Cancelled 


 


IgG4   Cancelled 


 


IgA   Cancelled 


 


IgM   Cancelled 














  03/13/20 03/13/20 03/13/20





  07:50 07:50 07:50


 


WBC  3.7 L  


 


RBC  4.55  


 


Hgb  12.9  


 


Hct  37.9  


 


MCV  83.2  


 


MCH  28.2  


 


MCHC  33.9  


 


RDW  19.2 H  


 


Plt Count  192  


 


MPV  7.9  


 


PT with INR   13.80 H 


 


INR   1.17 H 


 


PTT (Actin FS)   36.9 H 


 


Sodium    133 L


 


Potassium    4.1


 


Chloride    98


 


Carbon Dioxide    25


 


Anion Gap    11


 


BUN    13.0


 


Creatinine    1.3


 


Est GFR (CKD-EPI)AfAm    67.30


 


Est GFR (CKD-EPI)NonAf    58.07


 


POC Glucometer   


 


Random Glucose    93


 


Calcium    9.0


 


Total Bilirubin    0.3


 


AST    12 L


 


ALT    15


 


Alkaline Phosphatase    51


 


Total Protein    7.0


 


Albumin    3.2 L


 


U Ethyl Glucuronide   


 


IgG   


 


IgG4   


 


IgA   


 


IgM   











HOSPITAL COURSE:





Date of Admission:03/09/20





Date of Discharge: 03/13/20








65 y/o/m with PMHx of Chronic pancreatitis, HTN, depression, lower GI bleed, 

PTSD, TBI  presented to the ED due to abdominal pain that was thought to be due 

to gastroenteritis. CT A/P showed chronic pancreatitis and a pancreatic 

pseudocyst. GI saw the patient and stated that no acute intervention was needed 

but was asked to see the patient again because patient continued to have abd 

pain and diagnosed with patient with acute pancreatitis after reviewing imaging 

again with radiology. I spoke with patient's GI doctor at the [facility] and was 

told that the pancreatic pseudocyst in 12/19 only measured 2.2cm and now 

measures >6cm. GI recommended that if patient continued to require IV analgesia 

then to consider transfer for cyst gastrostomy with EUS. Patient continued to 

request IV pain medication due to abd pain. Patient was on agressive IVF due to 

acute pancreatitis. On imaging splenic vein was noted to be thrombosed, imaging 

on 12/19 showed thrombosis as well. Heme/Onc saw the patient and as patient has 

history of varices stated that the risk of bleeding outweighs benefits from 

anticoagulation and would not start anticoagulation at this time. Patient had an

SUE on admission which resolved with hydration. Renal U/S did not show any 

significant abnormalities. Patient A1c noted to be 7.6 and was previously on 

Metformin but was discontinued because his sugars were under control as per 

patient. Started on ISS while here. Patient's GI doctor at [facility] requested 

transfer for further workup. Patient accepted for transfer to [facility]. 


Minutes to complete discharge: 36





Discharge Summary


Problems reviewed: Yes


Reason For Visit: CHRONIC PANCREATITIS,PSEUDOCYST OF PANCREAS


Current Active Problems





Abdominal pain (Acute)


Pancreatic pseudocyst (Acute)


Pancreatitis (Acute)








Hospital Course: 








64 year old M with PMH of EtOH abuse came in with diffuse abdominal pain after 

eating a chicken broth . Of note this was second episode with same chicken 

broth. Patient denied any nausea or vomiting, and no bloody stools, melena. CT 

Abdomen was done which was unremarkable except for incidental pancreatic 

pseudocyst. GI was consulted- likely due to alcoholic changes in pancreas. 

Patient also has chronic pancreatitis on CT. 


Patient tolerated oral diet and will follow up with his GI and PMD [facility] 

[facility] Will return to ED if any new symptoms or concerns arise.


Encourage hydration and avoid NSAIDS. 


Condition: Stable





- Instructions


Diet, Activity, Other Instructions: 


You were admitted to the hospital due to abdominal pain. You were seen by 

gastroenterology here who diagnosed you with acute pancreatitis. On imaging you 

were found to have a pancreatic pseudocyst greater than 6cm in size, which is 

enlarged from [facility]. [facility] 

requ[facility]. You are being 

transferred to the [facility] for continued care. 


Referrals: 


ON STAFF,NOT [Non Staff, Medical] - 


Disposition: TRANSFER ACUTE CARE/OTHER HOSP





- Home Medications


Comprehensive Discharge Medication List: 


Ambulatory Orders





Nifedipine ER [Procardia XL -] 30 mg PO DAILY 06/08/19 


Trazodone HCl 300 mg PO HS 06/08/19 


Carvedilol [Coreg] 6.25 mg PO BID 03/12/20 








This patient is new to me today: No


Emergency Visit: Yes


ED Registration Date: 03/09/20


Care time: The patient presented to the Emergency Department on the above date 

and was hospitalized for further evaluation of their emergent condition.


Critical Care patient: No





- Discharge Referral


Referred to [facility] No





ATTENDING PHYSICIAN STATEMENT





I saw and evaluated the patient.


I reviewed the resident's note and discussed the case with the resident.


I agree with the resident's findings and plan as documented.








SUBJECTIVE:








OBJECTIVE:








ASSESSMENT AND PLAN:

## 2020-03-13 NOTE — PN
Teaching Attending Note


Name of Resident: Don Romero





ATTENDING PHYSICIAN STATEMENT





I saw and evaluated the patient.


I reviewed the resident's note and discussed the case with the resident.


I agree with the resident's findings and plan as documented.








SUBJECTIVE: Epigastric abdominal pain persisting, worse on eating. No vomiting. 

No diarrhea/melena/hematochezia. No fever/chills. 





OBJECTIVE: Afebrile, Hemodynamically Stable.





                                Last Vital Signs











Temp Pulse Resp BP Pulse Ox


 


 98.4 F   87   18   147/78   97 


 


 03/13/20 06:00  03/13/20 06:00  03/13/20 06:00  03/13/20 06:00  03/12/20 09:00








Heart - S1, S2, RRR


Lungs - clear to auscultation


Abdomen - Soft, epigastric tenderness. Bowel Sounds normal.


Extremities - no edema, no calf tenderness.





                         Laboratory Results - last 24 hr











  03/11/20 03/12/20 03/13/20





  18:20 12:15 05:50


 


WBC   


 


RBC   


 


Hgb   


 


Hct   


 


MCV   


 


MCH   


 


MCHC   


 


RDW   


 


Plt Count   


 


MPV   


 


PT with INR   


 


INR   


 


PTT (Actin FS)   


 


Sodium   


 


Potassium   


 


Chloride   


 


Carbon Dioxide   


 


Anion Gap   


 


BUN   


 


Creatinine   


 


Est GFR (CKD-EPI)AfAm   


 


Est GFR (CKD-EPI)NonAf   


 


POC Glucometer    98


 


Random Glucose   


 


Calcium   


 


Total Bilirubin   


 


AST   


 


ALT   


 


Alkaline Phosphatase   


 


Total Protein   


 


Albumin   


 


U Ethyl Glucuronide  Negative  


 


IgG   Cancelled 


 


IgG4   Cancelled 


 


IgA   Cancelled 


 


IgM   Cancelled 














  03/13/20 03/13/20 03/13/20





  07:50 07:50 07:50


 


WBC  3.7 L  


 


RBC  4.55  


 


Hgb  12.9  


 


Hct  37.9  


 


MCV  83.2  


 


MCH  28.2  


 


MCHC  33.9  


 


RDW  19.2 H  


 


Plt Count  192  


 


MPV  7.9  


 


PT with INR   13.80 H 


 


INR   1.17 H 


 


PTT (Actin FS)   36.9 H 


 


Sodium    133 L


 


Potassium    4.1


 


Chloride    98


 


Carbon Dioxide    25


 


Anion Gap    11


 


BUN    13.0


 


Creatinine    1.3


 


Est GFR (CKD-EPI)AfAm    67.30


 


Est GFR (CKD-EPI)NonAf    58.07


 


POC Glucometer   


 


Random Glucose    93


 


Calcium    9.0


 


Total Bilirubin    0.3


 


AST    12 L


 


ALT    15


 


Alkaline Phosphatase    51


 


Total Protein    7.0


 


Albumin    3.2 L


 


U Ethyl Glucuronide   


 


IgG   


 


IgG4   


 


IgA   


 


IgM   








                                        


                               Current Medications











Generic Name Dose Route Start Last Admin





  Trade Name Freq  PRN Reason Stop Dose Admin


 


Acetaminophen  650 mg  03/10/20 08:54 





  Tylenol -  PO  





  Q6H PRN  





  PAIN  


 


Buspirone HCl  10 mg  03/09/20 22:00  03/11/20 09:05





  Buspar -  PO   10 mg





  BID SILVANA   Administration


 


Carvedilol  6.25 mg  03/10/20 22:00  03/11/20 09:05





  Coreg -  PO   6.25 mg





  BID SILVANA   Administration


 


Folic Acid  1 mg  03/09/20 10:00  03/11/20 09:05





  Folic Acid -  PO   1 mg





  DAILY [facility]   Administration


 


Heparin Sodium (Porcine)  5,000 unit  03/11/20 22:00  03/13/20 13:33





  Heparin -  SQ   5,000 unit





  TID [facility]   Administration


 


Lactated Ringer's  1,000 mls @ 150 mls/hr  03/11/20 16:45  03/13/20 08:00





  Lactated Ringers Solution  IV   150 mls/hr





  ASDIR [facility]   Administration


 


Insulin Aspart  1 vial  03/11/20 16:30  03/13/20 06:01





  Novolog Vial Sliding Scale -  SQ   Not Given





  BIDAC [facility]  





  Protocol  


 


Metoprolol Tartrate  5 mg  03/11/20 21:38  03/12/20 06:24





  Lopressor Injection -  IVPB   5 mg





  Q8H PRN   Administration





  HYPERTENSION  


 


Morphine Sulfate  3 mg  03/11/20 15:37  03/13/20 13:32





  Morphine Sulfate  IVPUSH   3 mg





  Q6H PRN   Administration





  PAIN LEVEL 6-10  


 


Multivitamins/Minerals/Vitamin C  1 tab  03/09/20 10:00  03/11/20 09:04





  Tab-A-Vit -  PO   1 tab





  DAILY [facility]   Administration


 


Nifedipine  30 mg  03/09/20 19:30  03/11/20 09:04





  Procardia Xl -  PO   30 mg





  DAILY [facility]   Administration


 


Pantoprazole Sodium  40 mg  03/09/20 14:00  03/13/20 10:46





  Protonix Iv  IVPUSH   40 mg





  BID [facility]   Administration


 


Thiamine HCl  100 mg  03/09/20 10:00  03/11/20 09:05





  Vitamin B1 -  PO   100 mg





  DAILY [facility]   Administration


 


Trazodone HCl  300 mg  03/09/20 22:00  03/10/20 21:35





  Desyrel -  PO   300 mg





  HS [facility]   Administration








                                        


                                Home Medications











 Medication  Instructions  Recorded


 


Nifedipine ER [Procardia XL -] 30 mg PO DAILY 06/08/19


 


Trazodone HCl 300 mg PO HS 06/08/19


 


Carvedilol [Coreg] 6.25 mg PO BID 03/12/20

















ASSESSMENT/PLAN: 


  


64 year old male with history of Depression, Alcohol abuse, admitted with 

abdominal pain. 





CT Abdomen - revised verbal radiology reading - acute on chronic pancreatitis 

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 





1. Acute on Chronic Pancreatitis with Pancreatic Pseudocyst (larger compared to 

2cm [facility] 12[facility])


NPO/IV Fluids


EGD deferred by GI.


PPI BID. 


GI recommends MRCP and possible transfer to [facility] for ongoing 

investigation/management including possible cyst gastrostomy via EUS.


Patient's gastroenterologist contacted and accepts patient for transfer to [facility]. 





2. SUE - likely pre-renal, resolved with IV hydration.


Renal US negative.





3. Depression - continue Buspirone, Trazodone, Bupropion.





5. HTN - continue Coreg, Nifedipine. 





6. Hx Alcohol Abuse - no evidence of withdrawal


MVI, Thiamine, Folate.





7. 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

 imaging - appears chronic - was also present on last [facility] at [facility] 12[facility]. 

Hematology evaluated and given presence of varices on last EGD, the 

recommendation is to defer anticoagulation due to bleeding risk. 





8. Esophageal Varices on prior EGD at [facility]. No Cirrhotic changes on current Liver 

imaging. For MRCP. No evidence of bleeding currently.





GI Px - PPI


Planning [facility] to [facility].

## 2020-10-02 ENCOUNTER — HOSPITAL ENCOUNTER (EMERGENCY)
Dept: HOSPITAL 74 - JER | Age: 65
Discharge: HOME | End: 2020-10-02
Payer: COMMERCIAL

## 2020-10-02 VITALS — HEART RATE: 95 BPM | SYSTOLIC BLOOD PRESSURE: 163 MMHG | TEMPERATURE: 97.9 F | DIASTOLIC BLOOD PRESSURE: 95 MMHG

## 2020-10-02 VITALS — BODY MASS INDEX: 26.6 KG/M2

## 2020-10-02 DIAGNOSIS — F10.920: Primary | ICD-10-CM

## 2020-10-02 NOTE — PDOC
History of Present Illness





- General


Chief Complaint: Alcohol intoxication


Stated Complaint: INTOX


Time Seen by Provider: 10/02/20 11:22





- History of Present Illness


Initial Comments: 





10/02/20 11:31


pt presents to the ED after found intoxicated in his apartment by his social 

worker.  As per patient and , he was supposed to be [facility] to 

[facility].  The patient and the  apparently had a 

misunderstanding about how transport to the [facility] would be arranged.  The social 

worker then called EMS with the belief that the patient could be transferred 

from [place] to the [facility] for [facility].  





The patient admits to drinking today, and to chronic daily ETOH use.  He is 

calm, cooperative and oriented and denies physical or psychiatric complaints.  


10/02/20 11:36








Past History





- Medical History


Allergies/Adverse Reactions: 


                                    Allergies











Allergy/AdvReac Type Severity Reaction Status Date / Time


 


lisinopril Allergy   Verified 10/02/20 11:14











Home Medications: 


Ambulatory Orders





Nifedipine ER [Procardia XL -] 30 mg PO DAILY 06/08/19 


Trazodone HCl 300 mg PO HS 06/08/19 


Carvedilol [Coreg] 6.25 mg PO BID 03/12/20 








Anemia: No


Asthma: No


Cancer: No


Cardiac Disorders: Yes


CVA: No


COPD: Yes


CHF: No


Dementia: No


Diabetes: No


GI Disorders: Yes (gi bleed)


 Disorders: No


HTN: Yes


Hypercholesterolemia: No


Liver Disease: No


Psychiatric Problems: Yes (depression, alcohol abuse)


Seizures: No


Thyroid Disease: No





- Surgical History


Abdominal Surgery: No


Appendectomy: No


Cardiac Surgery: No


Cholecystectomy: No


Lung Surgery: No


Neurologic Surgery: No


Orthopedic Surgery: Yes (left hip replacement)





- Psycho-Social/Smoking History


Smoking History: Never smoked


Have you smoked in the past 12 months: No


If you are a former smoker, when did you quit?: 1989


Information on smoking cessation initiated: No





- Substance Abuse Hx (Audit-C & DAST Scrn)


How often the patient has a drink containing alcohol: 4 0r more times/wk


Number of drinks the patient has on a typical day: 7 to 9


How often the patient has six or more drinks on one occasion: Daily or almost 

daily


Score: In Men: 4 or > Positive; In Women: 3 or > Positive: 11


Screen Result (Pos requires Nsg. Audit-10AR): Positive


In the last yr the pt used illegal drug/Rx for NonMed reason: No


Score:  Yes response is considered Positive: 0


Screen Result (Positive result requires Nsg. DAST-10): Negative





**Review of Systems





- Review of Systems


Able to Perform ROS?: Yes


Is the patient limited English proficient: No


Constitutional: No: Symptoms Reported, See HPI, Chills, Diaphoresis, Fever, Loss

 of Appetite, Malaise, Night Sweats, Weakness, Weight Stable, Unintentional Wgt.

 Loss, Unexplained wgt Loss, Other


HEENTM: No: Symptoms Reported, See HPI, Eye Pain, Blurred Vision, Tearing, 

Recent change in vision, Double Vision, Cataracts, Ear Pain, Ocular Prothesis, 

Ear Discharge, Nose Pain, Nose Congestion, Tinnitus, Nose Bleeding, Hearing 

Loss, Throat Pain, Throat Swelling, Mouth Pain, Dental Problems, Difficulty 

Swallowing, Mouth Swelling, Other


Respiratory: No: Symptoms reported, See HPI, Cough, Orthopnea, Shortness of 

Breath, SOB with Exertion, SOB at Rest, Stridor, Wheezing, Productive cough, 

Hemoptysis, Other


Cardiac (ROS): No: Symptoms Reported, See HPI, Chest Pain, Edema, Irregular 

Heart Rate, Lightheadedness, Palpitations, Syncope, Chest Tightness, Other


ABD/GI: No: Symptoms Reported, See HPI, Abdominal Distended, Abd. Pain w/ 

defecation, Blood Streaked Bowels, Constipated, Diarrhea, Difficulty Swallowing,

 Nausea, Poor Appetite, Poor Fluid Intake, Rectal Bleeding, Vomiting, 

Indigestion, Abdominal cramping, Tarry Stools, Other


: No: Symptoms Reported, See HPI, Burning, Dysuria, Discharge, Frequency, 

Flank Pain, Hematuria, Incontinence, Pain, Urgency, Testicular Mass, Testicular 

Swelling, Lesions, Testicular Pain, Other


Musculoskeletal: No: Symptoms Reported, See HPI, Back Pain, Gout, Joint Pain, 

Joint Swelling, Muscle Pain, Muscle Weakness, Neck Pain, Joint Stiffness, Other


Integumentary: No: Symptoms Reported, See HPI, Bruising, Change in Color, Change

 in Hair/Nails, Dryness, Erythema, Flushing, Lesions, Lumps, Pallor, Pruritus, 

Rash, Sweating, Other


Neurological: No: Symptoms reported, See HPI, Headache, Numbness, Paresthesia, 

Pre-Existing Deficit, Seizure, Tingling, Tremors, Weakness, Unsteady Gait, 

Ataxia, Dizziness, Other





*Physical Exam





- Vital Signs


                                Last Vital Signs











Temp Pulse Resp BP Pulse Ox


 


 97.9 F   95 H  18   163/95   96 


 


 10/02/20 11:23  10/02/20 11:23  10/02/20 11:23  10/02/20 11:23  10/02/20 11:23














- Physical Exam





10/02/20 11:37


Gen: alert, NAD


HEENT: normocephalic, atraumatic


Cv: rrr no m/r/g


Pulm: CTA b/l


Abdomen: soft, non tender, non distended, no guarding or rebound


Ext: no edema


Neuro: alert and oriented x 3, ambulatory with steady gait


Psych: normal mood and affect, non SI or HI.





Medical Decision Making





- Medical Decision Making





10/02/20 11:41


Pt presents to the Ed after  called EMS because the patient was 

intoxicated in  his home.  The patient has no complaints.  He is mildly 

intoxicated, but is oriented and ambulatory.  No signs of psychiatric emergency.

  Will discharge home.   will make arrangements for outpatient 

detox. 





Discharge





- Discharge Information


Problems reviewed: Yes


Clinical Impression/Diagnosis: 


 Alcohol intoxication





Condition: Good


Disposition: HOME





- Admission


No





- Follow up/Referral





- Patient Discharge Instructions


Patient Printed Discharge Instructions:  DI for Alcohol Use Disorder


Additional Instructions: 


you came to the ED because you were intoxicated.  You should make arrangements 

to attend detox as an outpatient.  you should return to the ED for worsening 

symptoms, including pain, shortness of breath, severe nausea or vomiting, 

thoughts of hurting yourself or somebody else.  





- Post Discharge Activity

## 2021-01-07 ENCOUNTER — HOSPITAL ENCOUNTER (INPATIENT)
Dept: HOSPITAL 74 - JER | Age: 66
LOS: 4 days | Discharge: HOME | DRG: 378 | End: 2021-01-11
Attending: NURSE PRACTITIONER | Admitting: GENERAL ACUTE CARE HOSPITAL
Payer: COMMERCIAL

## 2021-01-07 VITALS — BODY MASS INDEX: 26.9 KG/M2

## 2021-01-07 DIAGNOSIS — I12.9: ICD-10-CM

## 2021-01-07 DIAGNOSIS — F10.10: ICD-10-CM

## 2021-01-07 DIAGNOSIS — K57.31: Primary | ICD-10-CM

## 2021-01-07 DIAGNOSIS — N18.9: ICD-10-CM

## 2021-01-07 DIAGNOSIS — N17.9: ICD-10-CM

## 2021-01-07 DIAGNOSIS — F32.9: ICD-10-CM

## 2021-01-07 DIAGNOSIS — F43.10: ICD-10-CM

## 2021-01-07 DIAGNOSIS — K63.5: ICD-10-CM

## 2021-01-07 DIAGNOSIS — F41.9: ICD-10-CM

## 2021-01-07 LAB
ALBUMIN SERPL-MCNC: 3.1 G/DL (ref 3.4–5)
ALP SERPL-CCNC: 40 U/L (ref 45–117)
ALT SERPL-CCNC: 44 U/L (ref 13–61)
ANION GAP SERPL CALC-SCNC: 10 MMOL/L (ref 8–16)
APTT BLD: 26.9 SECONDS (ref 25.2–36.5)
AST SERPL-CCNC: 43 U/L (ref 15–37)
BASOPHILS # BLD: 0.5 % (ref 0–2)
BILIRUB SERPL-MCNC: 1.3 MG/DL (ref 0.2–1)
BUN SERPL-MCNC: 22.1 MG/DL (ref 7–18)
CALCIUM SERPL-MCNC: 7.6 MG/DL (ref 8.5–10.1)
CHLORIDE SERPL-SCNC: 96 MMOL/L (ref 98–107)
CO2 SERPL-SCNC: 27 MMOL/L (ref 21–32)
CREAT SERPL-MCNC: 1.7 MG/DL (ref 0.55–1.3)
DEPRECATED RDW RBC AUTO: 17 % (ref 11.9–15.9)
DEPRECATED RDW RBC AUTO: 17.3 % (ref 11.9–15.9)
EOSINOPHIL # BLD: 1.1 % (ref 0–4.5)
GLUCOSE SERPL-MCNC: 218 MG/DL (ref 74–106)
HCT VFR BLD CALC: 26.5 % (ref 35.4–49)
HCT VFR BLD CALC: 29.9 % (ref 35.4–49)
HGB BLD-MCNC: 10.3 GM/DL (ref 11.7–16.9)
HGB BLD-MCNC: 9.1 GM/DL (ref 11.7–16.9)
INR BLD: 1.03 (ref 0.83–1.09)
LYMPHOCYTES # BLD: 26.4 % (ref 8–40)
MCH RBC QN AUTO: 30.8 PG (ref 25.7–33.7)
MCH RBC QN AUTO: 31 PG (ref 25.7–33.7)
MCHC RBC AUTO-ENTMCNC: 34.3 G/DL (ref 32–35.9)
MCHC RBC AUTO-ENTMCNC: 34.6 G/DL (ref 32–35.9)
MCV RBC: 89.5 FL (ref 80–96)
MCV RBC: 89.8 FL (ref 80–96)
MONOCYTES # BLD AUTO: 11.7 % (ref 3.8–10.2)
NEUTROPHILS # BLD: 60.3 % (ref 42.8–82.8)
PLATELET # BLD AUTO: 129 K/MM3 (ref 134–434)
PLATELET # BLD AUTO: 147 K/MM3 (ref 134–434)
PMV BLD: 7.9 FL (ref 7.5–11.1)
PMV BLD: 8.2 FL (ref 7.5–11.1)
PROT SERPL-MCNC: 6.4 G/DL (ref 6.4–8.2)
PT PNL PPP: 12.4 SEC (ref 9.7–13)
RBC # BLD AUTO: 2.96 M/MM3 (ref 4–5.6)
RBC # BLD AUTO: 3.34 M/MM3 (ref 4–5.6)
SODIUM SERPL-SCNC: 133 MMOL/L (ref 136–145)
WBC # BLD AUTO: 4.1 K/MM3 (ref 4–10)
WBC # BLD AUTO: 4.5 K/MM3 (ref 4–10)

## 2021-01-07 PROCEDURE — C9803 HOPD COVID-19 SPEC COLLECT: HCPCS

## 2021-01-07 PROCEDURE — U0003 INFECTIOUS AGENT DETECTION BY NUCLEIC ACID (DNA OR RNA); SEVERE ACUTE RESPIRATORY SYNDROME CORONAVIRUS 2 (SARS-COV-2) (CORONAVIRUS DISEASE [COVID-19]), AMPLIFIED PROBE TECHNIQUE, MAKING USE OF HIGH THROUGHPUT TECHNOLOGIES AS DESCRIBED BY CMS-2020-01-R: HCPCS

## 2021-01-07 PROCEDURE — 0DJ08ZZ INSPECTION OF UPPER INTESTINAL TRACT, VIA NATURAL OR ARTIFICIAL OPENING ENDOSCOPIC: ICD-10-PCS | Performed by: INTERNAL MEDICINE

## 2021-01-07 RX ADMIN — POTASSIUM CHLORIDE SCH MLS/HR: 7.46 INJECTION, SOLUTION INTRAVENOUS at 10:31

## 2021-01-07 RX ADMIN — POTASSIUM CHLORIDE SCH MLS/HR: 7.46 INJECTION, SOLUTION INTRAVENOUS at 11:24

## 2021-01-07 RX ADMIN — SODIUM CHLORIDE SCH MLS/HR: 9 INJECTION, SOLUTION INTRAVENOUS at 12:42

## 2021-01-07 RX ADMIN — SODIUM CHLORIDE SCH MLS: 9 INJECTION, SOLUTION INTRAVENOUS at 17:15

## 2021-01-07 RX ADMIN — CARVEDILOL SCH MG: 6.25 TABLET, FILM COATED ORAL at 21:57

## 2021-01-07 RX ADMIN — POTASSIUM CHLORIDE SCH MLS/HR: 149 INJECTION, SOLUTION, CONCENTRATE INTRAVENOUS at 19:35

## 2021-01-07 RX ADMIN — POTASSIUM CHLORIDE SCH MLS/HR: 7.46 INJECTION, SOLUTION INTRAVENOUS at 11:57

## 2021-01-08 LAB
ALBUMIN SERPL-MCNC: 2.9 G/DL (ref 3.4–5)
ALP SERPL-CCNC: 36 U/L (ref 45–117)
ALT SERPL-CCNC: 45 U/L (ref 13–61)
ANION GAP SERPL CALC-SCNC: 8 MMOL/L (ref 8–16)
APTT BLD: 25 SECONDS (ref 25.2–36.5)
AST SERPL-CCNC: 55 U/L (ref 15–37)
BASOPHILS # BLD: 1.3 % (ref 0–2)
BILIRUB SERPL-MCNC: 0.9 MG/DL (ref 0.2–1)
BUN SERPL-MCNC: 12.4 MG/DL (ref 7–18)
CALCIUM SERPL-MCNC: 6.9 MG/DL (ref 8.5–10.1)
CHLORIDE SERPL-SCNC: 104 MMOL/L (ref 98–107)
CO2 SERPL-SCNC: 25 MMOL/L (ref 21–32)
CREAT SERPL-MCNC: 1.2 MG/DL (ref 0.55–1.3)
DEPRECATED RDW RBC AUTO: 16.9 % (ref 11.9–15.9)
EOSINOPHIL # BLD: 2.5 % (ref 0–4.5)
GLUCOSE SERPL-MCNC: 147 MG/DL (ref 74–106)
HCT VFR BLD CALC: 26.5 % (ref 35.4–49)
HGB BLD-MCNC: 9 GM/DL (ref 11.7–16.9)
INR BLD: 1.02 (ref 0.83–1.09)
LYMPHOCYTES # BLD: 30.8 % (ref 8–40)
MAGNESIUM SERPL-MCNC: 1.1 MG/DL (ref 1.8–2.4)
MCH RBC QN AUTO: 30.9 PG (ref 25.7–33.7)
MCHC RBC AUTO-ENTMCNC: 33.8 G/DL (ref 32–35.9)
MCV RBC: 91.4 FL (ref 80–96)
MONOCYTES # BLD AUTO: 11.7 % (ref 3.8–10.2)
NEUTROPHILS # BLD: 53.7 % (ref 42.8–82.8)
PHOSPHATE SERPL-MCNC: 1.3 MG/DL (ref 2.5–4.9)
PLATELET # BLD AUTO: 106 K/MM3 (ref 134–434)
PMV BLD: 9 FL (ref 7.5–11.1)
PROT SERPL-MCNC: 5.8 G/DL (ref 6.4–8.2)
PT PNL PPP: 12.5 SEC (ref 9.7–13)
RBC # BLD AUTO: 2.9 M/MM3 (ref 4–5.6)
SODIUM SERPL-SCNC: 138 MMOL/L (ref 136–145)
WBC # BLD AUTO: 3.6 K/MM3 (ref 4–10)

## 2021-01-08 PROCEDURE — 0DBL8ZX EXCISION OF TRANSVERSE COLON, VIA NATURAL OR ARTIFICIAL OPENING ENDOSCOPIC, DIAGNOSTIC: ICD-10-PCS | Performed by: INTERNAL MEDICINE

## 2021-01-08 RX ADMIN — POTASSIUM CHLORIDE SCH MLS/HR: 149 INJECTION, SOLUTION, CONCENTRATE INTRAVENOUS at 09:39

## 2021-01-08 RX ADMIN — CARVEDILOL SCH MG: 6.25 TABLET, FILM COATED ORAL at 09:40

## 2021-01-08 RX ADMIN — CARVEDILOL SCH MG: 6.25 TABLET, FILM COATED ORAL at 22:08

## 2021-01-09 LAB
ALBUMIN SERPL-MCNC: 2.6 G/DL (ref 3.4–5)
ALP SERPL-CCNC: 50 U/L (ref 45–117)
ALT SERPL-CCNC: 60 U/L (ref 13–61)
ANION GAP SERPL CALC-SCNC: 7 MMOL/L (ref 8–16)
AST SERPL-CCNC: 91 U/L (ref 15–37)
BASOPHILS # BLD: 0.9 % (ref 0–2)
BASOPHILS # BLD: 1.1 % (ref 0–2)
BILIRUB SERPL-MCNC: 1 MG/DL (ref 0.2–1)
BUN SERPL-MCNC: 9.8 MG/DL (ref 7–18)
CALCIUM SERPL-MCNC: 7.3 MG/DL (ref 8.5–10.1)
CHLORIDE SERPL-SCNC: 103 MMOL/L (ref 98–107)
CO2 SERPL-SCNC: 27 MMOL/L (ref 21–32)
CREAT SERPL-MCNC: 1.2 MG/DL (ref 0.55–1.3)
DEPRECATED RDW RBC AUTO: 17 % (ref 11.9–15.9)
DEPRECATED RDW RBC AUTO: 17.4 % (ref 11.9–15.9)
EOSINOPHIL # BLD: 2.4 % (ref 0–4.5)
EOSINOPHIL # BLD: 2.7 % (ref 0–4.5)
GLUCOSE SERPL-MCNC: 181 MG/DL (ref 74–106)
HCT VFR BLD CALC: 23.2 % (ref 35.4–49)
HCT VFR BLD CALC: 24.2 % (ref 35.4–49)
HGB BLD-MCNC: 7.7 GM/DL (ref 11.7–16.9)
HGB BLD-MCNC: 8.2 GM/DL (ref 11.7–16.9)
LYMPHOCYTES # BLD: 25.4 % (ref 8–40)
LYMPHOCYTES # BLD: 33.3 % (ref 8–40)
MAGNESIUM SERPL-MCNC: 1.5 MG/DL (ref 1.8–2.4)
MCH RBC QN AUTO: 30.4 PG (ref 25.7–33.7)
MCH RBC QN AUTO: 30.8 PG (ref 25.7–33.7)
MCHC RBC AUTO-ENTMCNC: 33 G/DL (ref 32–35.9)
MCHC RBC AUTO-ENTMCNC: 33.7 G/DL (ref 32–35.9)
MCV RBC: 91.3 FL (ref 80–96)
MCV RBC: 92.1 FL (ref 80–96)
MONOCYTES # BLD AUTO: 11 % (ref 3.8–10.2)
MONOCYTES # BLD AUTO: 12.9 % (ref 3.8–10.2)
NEUTROPHILS # BLD: 50 % (ref 42.8–82.8)
NEUTROPHILS # BLD: 60.3 % (ref 42.8–82.8)
PHOSPHATE SERPL-MCNC: 3.1 MG/DL (ref 2.5–4.9)
PLATELET # BLD AUTO: 78 K/MM3 (ref 134–434)
PLATELET # BLD AUTO: 84 K/MM3 (ref 134–434)
PMV BLD: 8.7 FL (ref 7.5–11.1)
PMV BLD: 9.3 FL (ref 7.5–11.1)
PROT SERPL-MCNC: 5.3 G/DL (ref 6.4–8.2)
RBC # BLD AUTO: 2.52 M/MM3 (ref 4–5.6)
RBC # BLD AUTO: 2.65 M/MM3 (ref 4–5.6)
SODIUM SERPL-SCNC: 138 MMOL/L (ref 136–145)
WBC # BLD AUTO: 2.7 K/MM3 (ref 4–10)
WBC # BLD AUTO: 3.1 K/MM3 (ref 4–10)

## 2021-01-09 RX ADMIN — NIFEDIPINE SCH MG: 30 TABLET, EXTENDED RELEASE ORAL at 10:03

## 2021-01-09 RX ADMIN — CARVEDILOL SCH MG: 6.25 TABLET, FILM COATED ORAL at 10:04

## 2021-01-09 RX ADMIN — CARVEDILOL SCH MG: 6.25 TABLET, FILM COATED ORAL at 21:26

## 2021-01-09 RX ADMIN — TRAZODONE HYDROCHLORIDE SCH MG: 100 TABLET ORAL at 21:27

## 2021-01-09 RX ADMIN — TRAZODONE HYDROCHLORIDE SCH MG: 100 TABLET ORAL at 00:01

## 2021-01-10 LAB
ALBUMIN SERPL-MCNC: 2.9 G/DL (ref 3.4–5)
ALP SERPL-CCNC: 46 U/L (ref 45–117)
ALT SERPL-CCNC: 114 U/L (ref 13–61)
ANION GAP SERPL CALC-SCNC: 7 MMOL/L (ref 8–16)
AST SERPL-CCNC: 155 U/L (ref 15–37)
BASOPHILS # BLD: 0.9 % (ref 0–2)
BILIRUB SERPL-MCNC: 0.3 MG/DL (ref 0.2–1)
BUN SERPL-MCNC: 8 MG/DL (ref 7–18)
CALCIUM SERPL-MCNC: 8.1 MG/DL (ref 8.5–10.1)
CHLORIDE SERPL-SCNC: 104 MMOL/L (ref 98–107)
CO2 SERPL-SCNC: 26 MMOL/L (ref 21–32)
CREAT SERPL-MCNC: 1.2 MG/DL (ref 0.55–1.3)
DEPRECATED RDW RBC AUTO: 17.6 % (ref 11.9–15.9)
EOSINOPHIL # BLD: 2.7 % (ref 0–4.5)
GLUCOSE SERPL-MCNC: 214 MG/DL (ref 74–106)
HCT VFR BLD CALC: 25.8 % (ref 35.4–49)
HGB BLD-MCNC: 8.6 GM/DL (ref 11.7–16.9)
IRON SERPL-MCNC: 36 UG/DL (ref 50–175)
LYMPHOCYTES # BLD: 25.3 % (ref 8–40)
MAGNESIUM SERPL-MCNC: 1.4 MG/DL (ref 1.8–2.4)
MCH RBC QN AUTO: 30.7 PG (ref 25.7–33.7)
MCHC RBC AUTO-ENTMCNC: 33.2 G/DL (ref 32–35.9)
MCV RBC: 92.7 FL (ref 80–96)
MONOCYTES # BLD AUTO: 10.7 % (ref 3.8–10.2)
NEUTROPHILS # BLD: 60.4 % (ref 42.8–82.8)
PLATELET # BLD AUTO: 88 K/MM3 (ref 134–434)
PMV BLD: 8.7 FL (ref 7.5–11.1)
PROT SERPL-MCNC: 6.3 G/DL (ref 6.4–8.2)
RBC # BLD AUTO: 2.78 M/MM3 (ref 4–5.6)
SODIUM SERPL-SCNC: 137 MMOL/L (ref 136–145)
TIBC SERPL-MCNC: 234 UG/DL (ref 250–450)
WBC # BLD AUTO: 3.7 K/MM3 (ref 4–10)

## 2021-01-10 RX ADMIN — CARVEDILOL SCH MG: 6.25 TABLET, FILM COATED ORAL at 21:27

## 2021-01-10 RX ADMIN — NIFEDIPINE SCH MG: 30 TABLET, EXTENDED RELEASE ORAL at 11:16

## 2021-01-10 RX ADMIN — CARVEDILOL SCH MG: 6.25 TABLET, FILM COATED ORAL at 11:16

## 2021-01-10 RX ADMIN — TRAZODONE HYDROCHLORIDE SCH MG: 100 TABLET ORAL at 23:54

## 2021-01-11 VITALS — DIASTOLIC BLOOD PRESSURE: 69 MMHG | HEART RATE: 85 BPM | TEMPERATURE: 98.2 F | SYSTOLIC BLOOD PRESSURE: 138 MMHG

## 2021-01-11 LAB
ALBUMIN SERPL-MCNC: 2.9 G/DL (ref 3.4–5)
ALP SERPL-CCNC: 47 U/L (ref 45–117)
ALT SERPL-CCNC: 130 U/L (ref 13–61)
ANION GAP SERPL CALC-SCNC: 8 MMOL/L (ref 8–16)
AST SERPL-CCNC: 127 U/L (ref 15–37)
BASOPHILS # BLD: 1.1 % (ref 0–2)
BILIRUB SERPL-MCNC: 0.4 MG/DL (ref 0.2–1)
BUN SERPL-MCNC: 9.4 MG/DL (ref 7–18)
CALCIUM SERPL-MCNC: 8.4 MG/DL (ref 8.5–10.1)
CHLORIDE SERPL-SCNC: 100 MMOL/L (ref 98–107)
CO2 SERPL-SCNC: 29 MMOL/L (ref 21–32)
CREAT SERPL-MCNC: 1.1 MG/DL (ref 0.55–1.3)
DEPRECATED RDW RBC AUTO: 17.2 % (ref 11.9–15.9)
EOSINOPHIL # BLD: 3.1 % (ref 0–4.5)
GLUCOSE SERPL-MCNC: 236 MG/DL (ref 74–106)
HCT VFR BLD CALC: 23.7 % (ref 35.4–49)
HGB BLD-MCNC: 8.1 GM/DL (ref 11.7–16.9)
LYMPHOCYTES # BLD: 30.5 % (ref 8–40)
MAGNESIUM SERPL-MCNC: 1.2 MG/DL (ref 1.8–2.4)
MCH RBC QN AUTO: 31.1 PG (ref 25.7–33.7)
MCHC RBC AUTO-ENTMCNC: 34.2 G/DL (ref 32–35.9)
MCV RBC: 90.7 FL (ref 80–96)
MONOCYTES # BLD AUTO: 12.9 % (ref 3.8–10.2)
NEUTROPHILS # BLD: 52.4 % (ref 42.8–82.8)
PLATELET # BLD AUTO: 109 K/MM3 (ref 134–434)
PMV BLD: 8.9 FL (ref 7.5–11.1)
PROT SERPL-MCNC: 6.1 G/DL (ref 6.4–8.2)
RBC # BLD AUTO: 2.62 M/MM3 (ref 4–5.6)
SODIUM SERPL-SCNC: 137 MMOL/L (ref 136–145)
WBC # BLD AUTO: 4.1 K/MM3 (ref 4–10)

## 2021-01-11 RX ADMIN — INSULIN ASPART SCH UNIT: 100 INJECTION, SOLUTION INTRAVENOUS; SUBCUTANEOUS at 17:20

## 2021-01-11 RX ADMIN — NIFEDIPINE SCH MG: 30 TABLET, EXTENDED RELEASE ORAL at 09:37

## 2021-01-11 RX ADMIN — CARVEDILOL SCH MG: 6.25 TABLET, FILM COATED ORAL at 09:37

## 2021-01-11 RX ADMIN — INSULIN ASPART SCH UNIT: 100 INJECTION, SOLUTION INTRAVENOUS; SUBCUTANEOUS at 12:08

## 2021-04-15 ENCOUNTER — HOSPITAL ENCOUNTER (INPATIENT)
Dept: HOSPITAL 74 - JER | Age: 66
LOS: 5 days | Discharge: HOME | DRG: 378 | End: 2021-04-20
Attending: INTERNAL MEDICINE | Admitting: GENERAL ACUTE CARE HOSPITAL
Payer: COMMERCIAL

## 2021-04-15 VITALS — BODY MASS INDEX: 26.9 KG/M2

## 2021-04-15 DIAGNOSIS — K92.2: Primary | ICD-10-CM

## 2021-04-15 DIAGNOSIS — F41.8: ICD-10-CM

## 2021-04-15 DIAGNOSIS — F10.10: ICD-10-CM

## 2021-04-15 DIAGNOSIS — K86.3: ICD-10-CM

## 2021-04-15 DIAGNOSIS — F43.10: ICD-10-CM

## 2021-04-15 DIAGNOSIS — I10: ICD-10-CM

## 2021-04-15 DIAGNOSIS — G47.00: ICD-10-CM

## 2021-04-15 LAB
ALBUMIN SERPL-MCNC: 2.7 G/DL (ref 3.4–5)
ALP SERPL-CCNC: 36 U/L (ref 45–117)
ALT SERPL-CCNC: 37 U/L (ref 13–61)
ANION GAP SERPL CALC-SCNC: 7 MMOL/L (ref 8–16)
APPEARANCE UR: CLEAR
APTT BLD: 23.6 SECONDS (ref 25.2–36.5)
AST SERPL-CCNC: 37 U/L (ref 15–37)
BACTERIA # UR AUTO: (no result) /UL (ref 0–1359)
BASE EXCESS BLDV CALC-SCNC: -1.5 MMOL/L (ref -2–2)
BASOPHILS # BLD: 1.1 % (ref 0–2)
BILIRUB SERPL-MCNC: 0.4 MG/DL (ref 0.2–1)
BILIRUB UR STRIP.AUTO-MCNC: NEGATIVE MG/DL
BUN SERPL-MCNC: 16.4 MG/DL (ref 7–18)
CALCIUM SERPL-MCNC: 8.2 MG/DL (ref 8.5–10.1)
CASTS URNS QL MICRO: 1 /UL (ref 0–3.1)
CHLORIDE SERPL-SCNC: 105 MMOL/L (ref 98–107)
CO2 SERPL-SCNC: 25 MMOL/L (ref 21–32)
COLOR UR: YELLOW
CREAT SERPL-MCNC: 1.1 MG/DL (ref 0.55–1.3)
DEPRECATED RDW RBC AUTO: 19.3 % (ref 11.9–15.9)
EOSINOPHIL # BLD: 2.5 % (ref 0–4.5)
EPITH CASTS URNS QL MICRO: 10 /UL (ref 0–25.1)
GLUCOSE SERPL-MCNC: 137 MG/DL (ref 74–106)
HCT VFR BLD CALC: 15.1 % (ref 35.4–49)
HGB BLD-MCNC: 5 GM/DL (ref 11.7–16.9)
INR BLD: 1.04 (ref 0.83–1.09)
KETONES UR QL STRIP: (no result)
LEUKOCYTE ESTERASE UR QL STRIP.AUTO: NEGATIVE
LYMPHOCYTES # BLD: 23.2 % (ref 8–40)
MAGNESIUM SERPL-MCNC: 1.6 MG/DL (ref 1.8–2.4)
MCH RBC QN AUTO: 27.2 PG (ref 25.7–33.7)
MCHC RBC AUTO-ENTMCNC: 33 G/DL (ref 32–35.9)
MCV RBC: 82.3 FL (ref 80–96)
MONOCYTES # BLD AUTO: 13.8 % (ref 3.8–10.2)
NEUTROPHILS # BLD: 59.4 % (ref 42.8–82.8)
NITRITE UR QL STRIP: POSITIVE
PCO2 BLDV: 28.7 MMHG (ref 38–52)
PH BLDV: 7.5 [PH] (ref 7.31–7.41)
PH UR: 6.5 [PH] (ref 5–8)
PLATELET # BLD AUTO: 186 K/MM3 (ref 134–434)
PMV BLD: 8.9 FL (ref 7.5–11.1)
PROT SERPL-MCNC: 5.8 G/DL (ref 6.4–8.2)
PROT UR QL STRIP: NEGATIVE
PROT UR QL STRIP: NEGATIVE
PT PNL PPP: 12.6 SEC (ref 9.7–13)
RBC # BLD AUTO: 1.84 M/MM3 (ref 4–5.6)
RBC # BLD AUTO: 6 /UL (ref 0–23.9)
SAO2 % BLDV: 93 % (ref 70–80)
SODIUM SERPL-SCNC: 137 MMOL/L (ref 136–145)
SP GR UR: 1.02 (ref 1.01–1.03)
UROBILINOGEN UR STRIP-MCNC: 0.2 MG/DL (ref 0.2–1)
WBC # BLD AUTO: 5 K/MM3 (ref 4–10)
WBC # UR AUTO: 10 /UL (ref 0–25.8)

## 2021-04-15 PROCEDURE — U0005 INFEC AGEN DETEC AMPLI PROBE: HCPCS

## 2021-04-15 PROCEDURE — U0003 INFECTIOUS AGENT DETECTION BY NUCLEIC ACID (DNA OR RNA); SEVERE ACUTE RESPIRATORY SYNDROME CORONAVIRUS 2 (SARS-COV-2) (CORONAVIRUS DISEASE [COVID-19]), AMPLIFIED PROBE TECHNIQUE, MAKING USE OF HIGH THROUGHPUT TECHNOLOGIES AS DESCRIBED BY CMS-2020-01-R: HCPCS

## 2021-04-15 PROCEDURE — P9058 RBC, L/R, CMV-NEG, IRRAD: HCPCS

## 2021-04-15 PROCEDURE — C9803 HOPD COVID-19 SPEC COLLECT: HCPCS

## 2021-04-15 PROCEDURE — 30233N1 TRANSFUSION OF NONAUTOLOGOUS RED BLOOD CELLS INTO PERIPHERAL VEIN, PERCUTANEOUS APPROACH: ICD-10-PCS | Performed by: INTERNAL MEDICINE

## 2021-04-16 LAB
ALBUMIN SERPL-MCNC: 2.4 G/DL (ref 3.4–5)
ALP SERPL-CCNC: 34 U/L (ref 45–117)
ALT SERPL-CCNC: 27 U/L (ref 13–61)
ANION GAP SERPL CALC-SCNC: 5 MMOL/L (ref 8–16)
AST SERPL-CCNC: 21 U/L (ref 15–37)
BILIRUB CONJ SERPL-MCNC: 0.3 MG/DL (ref 0–0.2)
BILIRUB DIRECT SERPL-MCNC: 172 U/L (ref 87–246)
BILIRUB SERPL-MCNC: 1.1 MG/DL (ref 0.2–1)
BUN SERPL-MCNC: 14.4 MG/DL (ref 7–18)
CALCIUM SERPL-MCNC: 7.5 MG/DL (ref 8.5–10.1)
CHLORIDE SERPL-SCNC: 108 MMOL/L (ref 98–107)
CO2 SERPL-SCNC: 25 MMOL/L (ref 21–32)
CREAT SERPL-MCNC: 1 MG/DL (ref 0.55–1.3)
DEPRECATED RDW RBC AUTO: 16.7 % (ref 11.9–15.9)
DEPRECATED RDW RBC AUTO: 17.1 % (ref 11.9–15.9)
GLUCOSE SERPL-MCNC: 153 MG/DL (ref 74–106)
HCT VFR BLD CALC: 19.1 % (ref 35.4–49)
HCT VFR BLD CALC: 21.5 % (ref 35.4–49)
HGB BLD-MCNC: 6.7 GM/DL (ref 11.7–16.9)
HGB BLD-MCNC: 7.2 GM/DL (ref 11.7–16.9)
INR BLD: 1.05 (ref 0.83–1.09)
MAGNESIUM SERPL-MCNC: 1.8 MG/DL (ref 1.8–2.4)
MCH RBC QN AUTO: 28.4 PG (ref 25.7–33.7)
MCH RBC QN AUTO: 29.2 PG (ref 25.7–33.7)
MCHC RBC AUTO-ENTMCNC: 33.6 G/DL (ref 32–35.9)
MCHC RBC AUTO-ENTMCNC: 34.8 G/DL (ref 32–35.9)
MCV RBC: 83.7 FL (ref 80–96)
MCV RBC: 84.4 FL (ref 80–96)
PHOSPHATE SERPL-MCNC: 3 MG/DL (ref 2.5–4.9)
PLATELET # BLD AUTO: 151 K/MM3 (ref 134–434)
PLATELET # BLD AUTO: 159 K/MM3 (ref 134–434)
PMV BLD: 8.5 FL (ref 7.5–11.1)
PMV BLD: 8.6 FL (ref 7.5–11.1)
PROT SERPL-MCNC: 5.1 G/DL (ref 6.4–8.2)
PT PNL PPP: 12.7 SEC (ref 9.7–13)
RBC # BLD AUTO: 2.28 M/MM3 (ref 4–5.6)
RBC # BLD AUTO: 2.55 M/MM3 (ref 4–5.6)
RETICS # AUTO: 3.18 % (ref 0.5–1.5)
SODIUM SERPL-SCNC: 138 MMOL/L (ref 136–145)
WBC # BLD AUTO: 4.3 K/MM3 (ref 4–10)
WBC # BLD AUTO: 4.7 K/MM3 (ref 4–10)

## 2021-04-16 RX ADMIN — FERROUS SULFATE TAB EC 324 MG (65 MG FE EQUIVALENT) SCH MG: 324 (65 FE) TABLET DELAYED RESPONSE at 21:05

## 2021-04-16 RX ADMIN — PANTOPRAZOLE SODIUM SCH MG: 40 INJECTION, POWDER, FOR SOLUTION INTRAVENOUS at 09:10

## 2021-04-16 RX ADMIN — CEFTRIAXONE SCH MLS/HR: 1 INJECTION, POWDER, FOR SOLUTION INTRAMUSCULAR; INTRAVENOUS at 14:10

## 2021-04-16 RX ADMIN — PANTOPRAZOLE SODIUM SCH MG: 40 INJECTION, POWDER, FOR SOLUTION INTRAVENOUS at 21:07

## 2021-04-16 RX ADMIN — ACETAMINOPHEN PRN MG: 325 TABLET ORAL at 22:19

## 2021-04-16 RX ADMIN — CARVEDILOL SCH MG: 6.25 TABLET, FILM COATED ORAL at 21:40

## 2021-04-16 RX ADMIN — TRAZODONE HYDROCHLORIDE SCH MG: 100 TABLET ORAL at 21:40

## 2021-04-16 RX ADMIN — PANTOPRAZOLE SODIUM SCH MG: 40 INJECTION, POWDER, FOR SOLUTION INTRAVENOUS at 01:58

## 2021-04-17 LAB
ALBUMIN SERPL-MCNC: 2.4 G/DL (ref 3.4–5)
ALP SERPL-CCNC: 35 U/L (ref 45–117)
ALT SERPL-CCNC: 24 U/L (ref 13–61)
ANION GAP SERPL CALC-SCNC: 6 MMOL/L (ref 8–16)
AST SERPL-CCNC: 17 U/L (ref 15–37)
BILIRUB SERPL-MCNC: 0.4 MG/DL (ref 0.2–1)
BUN SERPL-MCNC: 9.6 MG/DL (ref 7–18)
CALCIUM SERPL-MCNC: 7.7 MG/DL (ref 8.5–10.1)
CHLORIDE SERPL-SCNC: 108 MMOL/L (ref 98–107)
CO2 SERPL-SCNC: 26 MMOL/L (ref 21–32)
CREAT SERPL-MCNC: 1 MG/DL (ref 0.55–1.3)
DEPRECATED RDW RBC AUTO: 16.5 % (ref 11.9–15.9)
GLUCOSE SERPL-MCNC: 117 MG/DL (ref 74–106)
HCT VFR BLD CALC: 21.4 % (ref 35.4–49)
HGB BLD-MCNC: 7.3 GM/DL (ref 11.7–16.9)
MAGNESIUM SERPL-MCNC: 1.9 MG/DL (ref 1.8–2.4)
MCH RBC QN AUTO: 28.8 PG (ref 25.7–33.7)
MCHC RBC AUTO-ENTMCNC: 34 G/DL (ref 32–35.9)
MCV RBC: 84.7 FL (ref 80–96)
PHOSPHATE SERPL-MCNC: 3.6 MG/DL (ref 2.5–4.9)
PLATELET # BLD AUTO: 179 K/MM3 (ref 134–434)
PMV BLD: 8.8 FL (ref 7.5–11.1)
PROT SERPL-MCNC: 5.2 G/DL (ref 6.4–8.2)
RBC # BLD AUTO: 2.53 M/MM3 (ref 4–5.6)
SODIUM SERPL-SCNC: 140 MMOL/L (ref 136–145)
WBC # BLD AUTO: 4.2 K/MM3 (ref 4–10)

## 2021-04-17 RX ADMIN — CEFTRIAXONE SCH MLS/HR: 1 INJECTION, POWDER, FOR SOLUTION INTRAMUSCULAR; INTRAVENOUS at 09:52

## 2021-04-17 RX ADMIN — ACETAMINOPHEN PRN MG: 325 TABLET ORAL at 21:46

## 2021-04-17 RX ADMIN — CARVEDILOL SCH MG: 6.25 TABLET, FILM COATED ORAL at 21:46

## 2021-04-17 RX ADMIN — PANTOPRAZOLE SODIUM SCH MG: 40 INJECTION, POWDER, FOR SOLUTION INTRAVENOUS at 09:54

## 2021-04-17 RX ADMIN — PANTOPRAZOLE SODIUM SCH MG: 40 INJECTION, POWDER, FOR SOLUTION INTRAVENOUS at 21:46

## 2021-04-17 RX ADMIN — TRAZODONE HYDROCHLORIDE SCH MG: 100 TABLET ORAL at 21:47

## 2021-04-17 RX ADMIN — CYANOCOBALAMIN TAB 1000 MCG SCH MCG: 1000 TAB at 09:51

## 2021-04-17 RX ADMIN — CARVEDILOL SCH MG: 6.25 TABLET, FILM COATED ORAL at 09:51

## 2021-04-17 RX ADMIN — Medication SCH MG: at 09:51

## 2021-04-17 RX ADMIN — FERROUS SULFATE TAB EC 324 MG (65 MG FE EQUIVALENT) SCH MG: 324 (65 FE) TABLET DELAYED RESPONSE at 21:46

## 2021-04-17 RX ADMIN — FERROUS SULFATE TAB EC 324 MG (65 MG FE EQUIVALENT) SCH MG: 324 (65 FE) TABLET DELAYED RESPONSE at 09:52

## 2021-04-18 LAB
ANION GAP SERPL CALC-SCNC: 7 MMOL/L (ref 8–16)
BUN SERPL-MCNC: 10 MG/DL (ref 7–18)
CALCIUM SERPL-MCNC: 7.9 MG/DL (ref 8.5–10.1)
CHLORIDE SERPL-SCNC: 105 MMOL/L (ref 98–107)
CO2 SERPL-SCNC: 27 MMOL/L (ref 21–32)
CREAT SERPL-MCNC: 1.3 MG/DL (ref 0.55–1.3)
DEPRECATED RDW RBC AUTO: 15.9 % (ref 11.9–15.9)
DEPRECATED RDW RBC AUTO: 17.1 % (ref 11.9–15.9)
GLUCOSE SERPL-MCNC: 157 MG/DL (ref 74–106)
HCT VFR BLD CALC: 19.9 % (ref 35.4–49)
HCT VFR BLD CALC: 24.6 % (ref 35.4–49)
HGB BLD-MCNC: 6.7 GM/DL (ref 11.7–16.9)
HGB BLD-MCNC: 8.3 GM/DL (ref 11.7–16.9)
MCH RBC QN AUTO: 28.8 PG (ref 25.7–33.7)
MCH RBC QN AUTO: 29 PG (ref 25.7–33.7)
MCHC RBC AUTO-ENTMCNC: 33.9 G/DL (ref 32–35.9)
MCHC RBC AUTO-ENTMCNC: 34 G/DL (ref 32–35.9)
MCV RBC: 85 FL (ref 80–96)
MCV RBC: 85.3 FL (ref 80–96)
PLATELET # BLD AUTO: 228 K/MM3 (ref 134–434)
PLATELET # BLD AUTO: 236 K/MM3 (ref 134–434)
PMV BLD: 8.5 FL (ref 7.5–11.1)
PMV BLD: 8.5 FL (ref 7.5–11.1)
RBC # BLD AUTO: 2.33 M/MM3 (ref 4–5.6)
RBC # BLD AUTO: 2.9 M/MM3 (ref 4–5.6)
SODIUM SERPL-SCNC: 139 MMOL/L (ref 136–145)
WBC # BLD AUTO: 4.6 K/MM3 (ref 4–10)
WBC # BLD AUTO: 4.8 K/MM3 (ref 4–10)

## 2021-04-18 RX ADMIN — CEFTRIAXONE SCH MLS/HR: 1 INJECTION, POWDER, FOR SOLUTION INTRAMUSCULAR; INTRAVENOUS at 11:06

## 2021-04-18 RX ADMIN — CYANOCOBALAMIN TAB 1000 MCG SCH MCG: 1000 TAB at 11:08

## 2021-04-18 RX ADMIN — TRAZODONE HYDROCHLORIDE SCH MG: 100 TABLET ORAL at 21:13

## 2021-04-18 RX ADMIN — FERROUS SULFATE TAB EC 324 MG (65 MG FE EQUIVALENT) SCH MG: 324 (65 FE) TABLET DELAYED RESPONSE at 21:12

## 2021-04-18 RX ADMIN — PANTOPRAZOLE SODIUM SCH MG: 40 INJECTION, POWDER, FOR SOLUTION INTRAVENOUS at 11:07

## 2021-04-18 RX ADMIN — ACETAMINOPHEN PRN MG: 325 TABLET ORAL at 21:12

## 2021-04-18 RX ADMIN — Medication SCH MG: at 11:07

## 2021-04-18 RX ADMIN — FERROUS SULFATE TAB EC 324 MG (65 MG FE EQUIVALENT) SCH MG: 324 (65 FE) TABLET DELAYED RESPONSE at 11:08

## 2021-04-18 RX ADMIN — DOCUSATE SODIUM SCH: 100 CAPSULE, LIQUID FILLED ORAL at 17:34

## 2021-04-18 RX ADMIN — PANTOPRAZOLE SODIUM SCH MG: 40 INJECTION, POWDER, FOR SOLUTION INTRAVENOUS at 21:13

## 2021-04-19 LAB
ANION GAP SERPL CALC-SCNC: 4 MMOL/L (ref 8–16)
APPEARANCE UR: CLEAR
BILIRUB UR STRIP.AUTO-MCNC: NEGATIVE MG/DL
BUN SERPL-MCNC: 9.8 MG/DL (ref 7–18)
CALCIUM SERPL-MCNC: 8.5 MG/DL (ref 8.5–10.1)
CHLORIDE SERPL-SCNC: 105 MMOL/L (ref 98–107)
CO2 SERPL-SCNC: 27 MMOL/L (ref 21–32)
COLOR UR: YELLOW
CREAT SERPL-MCNC: 1.2 MG/DL (ref 0.55–1.3)
DEPRECATED RDW RBC AUTO: 16.3 % (ref 11.9–15.9)
GLUCOSE SERPL-MCNC: 171 MG/DL (ref 74–106)
HCT VFR BLD CALC: 23.5 % (ref 35.4–49)
HGB BLD-MCNC: 8 GM/DL (ref 11.7–16.9)
KETONES UR QL STRIP: NEGATIVE
LEUKOCYTE ESTERASE UR QL STRIP.AUTO: NEGATIVE
MAGNESIUM SERPL-MCNC: 2.1 MG/DL (ref 1.8–2.4)
MCH RBC QN AUTO: 29 PG (ref 25.7–33.7)
MCHC RBC AUTO-ENTMCNC: 33.9 G/DL (ref 32–35.9)
MCV RBC: 85.7 FL (ref 80–96)
NITRITE UR QL STRIP: NEGATIVE
PH UR: 6 [PH] (ref 5–8)
PHOSPHATE SERPL-MCNC: 4.3 MG/DL (ref 2.5–4.9)
PLATELET # BLD AUTO: 268 K/MM3 (ref 134–434)
PMV BLD: 8.3 FL (ref 7.5–11.1)
PROT UR QL STRIP: (no result)
PROT UR QL STRIP: NEGATIVE
RBC # BLD AUTO: 2.74 M/MM3 (ref 4–5.6)
SODIUM SERPL-SCNC: 136 MMOL/L (ref 136–145)
SP GR UR: 1.02 (ref 1.01–1.03)
UROBILINOGEN UR STRIP-MCNC: 0.2 MG/DL (ref 0.2–1)
WBC # BLD AUTO: 5.1 K/MM3 (ref 4–10)

## 2021-04-19 RX ADMIN — PANTOPRAZOLE SODIUM SCH MG: 40 INJECTION, POWDER, FOR SOLUTION INTRAVENOUS at 11:31

## 2021-04-19 RX ADMIN — DOCUSATE SODIUM SCH: 100 CAPSULE, LIQUID FILLED ORAL at 09:46

## 2021-04-19 RX ADMIN — Medication SCH MG: at 09:45

## 2021-04-19 RX ADMIN — PANTOPRAZOLE SODIUM SCH MG: 40 INJECTION, POWDER, FOR SOLUTION INTRAVENOUS at 21:25

## 2021-04-19 RX ADMIN — PANTOPRAZOLE SODIUM SCH MG: 40 INJECTION, POWDER, FOR SOLUTION INTRAVENOUS at 09:46

## 2021-04-19 RX ADMIN — FERROUS SULFATE TAB EC 324 MG (65 MG FE EQUIVALENT) SCH MG: 324 (65 FE) TABLET DELAYED RESPONSE at 09:45

## 2021-04-19 RX ADMIN — CYANOCOBALAMIN TAB 1000 MCG SCH MCG: 1000 TAB at 09:45

## 2021-04-19 RX ADMIN — FERROUS SULFATE TAB EC 324 MG (65 MG FE EQUIVALENT) SCH MG: 324 (65 FE) TABLET DELAYED RESPONSE at 21:25

## 2021-04-20 VITALS — DIASTOLIC BLOOD PRESSURE: 86 MMHG | SYSTOLIC BLOOD PRESSURE: 134 MMHG | HEART RATE: 89 BPM | TEMPERATURE: 99 F

## 2021-04-20 LAB
ALBUMIN SERPL-MCNC: 2.4 G/DL (ref 3.4–5)
ALP SERPL-CCNC: 43 U/L (ref 45–117)
ALT SERPL-CCNC: 18 U/L (ref 13–61)
ANION GAP SERPL CALC-SCNC: 6 MMOL/L (ref 8–16)
AST SERPL-CCNC: 15 U/L (ref 15–37)
BILIRUB SERPL-MCNC: 0.3 MG/DL (ref 0.2–1)
BUN SERPL-MCNC: 5.7 MG/DL (ref 7–18)
CALCIUM SERPL-MCNC: 8 MG/DL (ref 8.5–10.1)
CHLORIDE SERPL-SCNC: 104 MMOL/L (ref 98–107)
CO2 SERPL-SCNC: 26 MMOL/L (ref 21–32)
CREAT SERPL-MCNC: 1.2 MG/DL (ref 0.55–1.3)
DEPRECATED RDW RBC AUTO: 16.5 % (ref 11.9–15.9)
GLUCOSE SERPL-MCNC: 240 MG/DL (ref 74–106)
HCT VFR BLD CALC: 23.6 % (ref 35.4–49)
HGB BLD-MCNC: 8.1 GM/DL (ref 11.7–16.9)
MAGNESIUM SERPL-MCNC: 1.9 MG/DL (ref 1.8–2.4)
MCH RBC QN AUTO: 29 PG (ref 25.7–33.7)
MCHC RBC AUTO-ENTMCNC: 34.2 G/DL (ref 32–35.9)
MCV RBC: 84.8 FL (ref 80–96)
PLATELET # BLD AUTO: 243 K/MM3 (ref 134–434)
PMV BLD: 8.2 FL (ref 7.5–11.1)
PROT SERPL-MCNC: 5.5 G/DL (ref 6.4–8.2)
RBC # BLD AUTO: 2.79 M/MM3 (ref 4–5.6)
SODIUM SERPL-SCNC: 135 MMOL/L (ref 136–145)
WBC # BLD AUTO: 4.9 K/MM3 (ref 4–10)

## 2021-04-20 RX ADMIN — PANTOPRAZOLE SODIUM SCH MG: 40 INJECTION, POWDER, FOR SOLUTION INTRAVENOUS at 10:24

## 2021-04-20 RX ADMIN — FERROUS SULFATE TAB EC 324 MG (65 MG FE EQUIVALENT) SCH MG: 324 (65 FE) TABLET DELAYED RESPONSE at 10:25

## 2021-05-16 ENCOUNTER — HOSPITAL ENCOUNTER (INPATIENT)
Dept: HOSPITAL 74 - JER | Age: 66
LOS: 5 days | Discharge: ANOTHER HOSPITAL | DRG: 378 | End: 2021-05-21
Attending: INTERNAL MEDICINE | Admitting: INTERNAL MEDICINE
Payer: COMMERCIAL

## 2021-05-16 VITALS — BODY MASS INDEX: 26.3 KG/M2

## 2021-05-16 DIAGNOSIS — R00.0: ICD-10-CM

## 2021-05-16 DIAGNOSIS — N18.9: ICD-10-CM

## 2021-05-16 DIAGNOSIS — E87.2: ICD-10-CM

## 2021-05-16 DIAGNOSIS — K31.89: ICD-10-CM

## 2021-05-16 DIAGNOSIS — K64.8: ICD-10-CM
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
## 2021-05-16 DIAGNOSIS — K86.1: ICD-10-CM

## 2021-05-16 DIAGNOSIS — F41.8: ICD-10-CM

## 2021-05-16 DIAGNOSIS — N17.9: ICD-10-CM

## 2021-05-16 DIAGNOSIS — D62: ICD-10-CM

## 2021-05-16 DIAGNOSIS — K29.50: ICD-10-CM

## 2021-05-16 DIAGNOSIS — F10.10: ICD-10-CM

## 2021-05-16 DIAGNOSIS — K57.30: ICD-10-CM

## 2021-05-16 DIAGNOSIS — K92.2: Primary | ICD-10-CM

## 2021-05-16 DIAGNOSIS — K44.9: ICD-10-CM

## 2021-05-16 DIAGNOSIS — L81.4: ICD-10-CM

## 2021-05-16 DIAGNOSIS — I10: ICD-10-CM

## 2021-05-16 PROCEDURE — U0003 INFECTIOUS AGENT DETECTION BY NUCLEIC ACID (DNA OR RNA); SEVERE ACUTE RESPIRATORY SYNDROME CORONAVIRUS 2 (SARS-COV-2) (CORONAVIRUS DISEASE [COVID-19]), AMPLIFIED PROBE TECHNIQUE, MAKING USE OF HIGH THROUGHPUT TECHNOLOGIES AS DESCRIBED BY CMS-2020-01-R: HCPCS

## 2021-05-16 PROCEDURE — U0005 INFEC AGEN DETEC AMPLI PROBE: HCPCS

## 2021-05-16 PROCEDURE — C9803 HOPD COVID-19 SPEC COLLECT: HCPCS

## 2021-05-16 PROCEDURE — P9058 RBC, L/R, CMV-NEG, IRRAD: HCPCS

## 2021-05-17 LAB
ALBUMIN SERPL-MCNC: 2.7 G/DL (ref 3.4–5)
ALP SERPL-CCNC: 42 U/L (ref 45–117)
ALT SERPL-CCNC: 13 U/L (ref 13–61)
ANION GAP SERPL CALC-SCNC: 16 MMOL/L (ref 8–16)
APPEARANCE UR: CLEAR
APTT BLD: 22.8 SECONDS (ref 25.2–36.5)
AST SERPL-CCNC: 26 U/L (ref 15–37)
BACTERIA # UR AUTO: 95.5 /UL (ref 0–1359)
BASOPHILS # BLD: 0.6 % (ref 0–2)
BASOPHILS # BLD: 1 % (ref 0–2)
BILIRUB SERPL-MCNC: 0.2 MG/DL (ref 0.2–1)
BILIRUB UR STRIP.AUTO-MCNC: NEGATIVE MG/DL
BUN SERPL-MCNC: 26.1 MG/DL (ref 7–18)
CALCIUM SERPL-MCNC: 7.3 MG/DL (ref 8.5–10.1)
CASTS URNS QL MICRO: 2 /UL (ref 0–3.1)
CHLORIDE SERPL-SCNC: 112 MMOL/L (ref 98–107)
CO2 SERPL-SCNC: 18 MMOL/L (ref 21–32)
COLOR UR: YELLOW
CREAT SERPL-MCNC: 1.6 MG/DL (ref 0.55–1.3)
DEPRECATED RDW RBC AUTO: 18 % (ref 11.9–15.9)
DEPRECATED RDW RBC AUTO: 18.2 % (ref 11.9–15.9)
DEPRECATED RDW RBC AUTO: 19.3 % (ref 11.9–15.9)
EOSINOPHIL # BLD: 0.1 % (ref 0–4.5)
EOSINOPHIL # BLD: 0.2 % (ref 0–4.5)
EPITH CASTS URNS QL MICRO: 6 /UL (ref 0–25.1)
GLUCOSE SERPL-MCNC: 101 MG/DL (ref 74–106)
HCT VFR BLD CALC: 23.1 % (ref 35.4–49)
HCT VFR BLD CALC: 24 % (ref 35.4–49)
HCT VFR BLD CALC: 26.3 % (ref 35.4–49)
HGB BLD-MCNC: 7.3 GM/DL (ref 11.7–16.9)
HGB BLD-MCNC: 7.5 GM/DL (ref 11.7–16.9)
HGB BLD-MCNC: 8.3 GM/DL (ref 11.7–16.9)
INR BLD: 1.12 (ref 0.83–1.09)
KETONES UR QL STRIP: (no result)
LACTATE SERPL-MCNC: 11 MMOL/L (ref 0.4–2)
LACTATE SERPL-MCNC: 12 MMOL/L (ref 0.4–2)
LACTATE SERPL-MCNC: 13 MMOL/L (ref 0.4–2)
LEUKOCYTE ESTERASE UR QL STRIP.AUTO: NEGATIVE
LIPASE SERPL-CCNC: 279 U/L (ref 73–393)
LYMPHOCYTES # BLD: 14.8 % (ref 8–40)
LYMPHOCYTES # BLD: 16.6 % (ref 8–40)
MCH RBC QN AUTO: 26.4 PG (ref 25.7–33.7)
MCH RBC QN AUTO: 27.5 PG (ref 25.7–33.7)
MCH RBC QN AUTO: 27.5 PG (ref 25.7–33.7)
MCHC RBC AUTO-ENTMCNC: 30.2 G/DL (ref 32–35.9)
MCHC RBC AUTO-ENTMCNC: 31.4 G/DL (ref 32–35.9)
MCHC RBC AUTO-ENTMCNC: 32.3 G/DL (ref 32–35.9)
MCV RBC: 85.1 FL (ref 80–96)
MCV RBC: 87.4 FL (ref 80–96)
MCV RBC: 87.6 FL (ref 80–96)
MONOCYTES # BLD AUTO: 14.7 % (ref 3.8–10.2)
MONOCYTES # BLD AUTO: 5.5 % (ref 3.8–10.2)
NEUTROPHILS # BLD: 67.6 % (ref 42.8–82.8)
NEUTROPHILS # BLD: 78.9 % (ref 42.8–82.8)
NITRITE UR QL STRIP: POSITIVE
PH UR: 5.5 [PH] (ref 5–8)
PLATELET # BLD AUTO: 216 K/MM3 (ref 134–434)
PLATELET # BLD AUTO: 273 K/MM3 (ref 134–434)
PLATELET # BLD AUTO: 296 K/MM3 (ref 134–434)
PMV BLD: 8.4 FL (ref 7.5–11.1)
PMV BLD: 8.6 FL (ref 7.5–11.1)
PMV BLD: 8.9 FL (ref 7.5–11.1)
PROT SERPL-MCNC: 5.7 G/DL (ref 6.4–8.2)
PROT UR QL STRIP: (no result)
PROT UR QL STRIP: NEGATIVE
PT PNL PPP: 13.7 SEC (ref 9.7–13)
RBC # BLD AUTO: 17 /UL (ref 0–23.9)
RBC # BLD AUTO: 2.71 M/MM3 (ref 4–5.6)
RBC # BLD AUTO: 2.75 M/MM3 (ref 4–5.6)
RBC # BLD AUTO: 3 M/MM3 (ref 4–5.6)
SODIUM SERPL-SCNC: 146 MMOL/L (ref 136–145)
SP GR UR: 1.02 (ref 1.01–1.03)
UROBILINOGEN UR STRIP-MCNC: 0.2 MG/DL (ref 0.2–1)
WBC # BLD AUTO: 13.1 K/MM3 (ref 4–10)
WBC # BLD AUTO: 14.8 K/MM3 (ref 4–10)
WBC # BLD AUTO: 8.5 K/MM3 (ref 4–10)
WBC # UR AUTO: 4 /UL (ref 0–25.8)

## 2021-05-17 PROCEDURE — 30233N1 TRANSFUSION OF NONAUTOLOGOUS RED BLOOD CELLS INTO PERIPHERAL VEIN, PERCUTANEOUS APPROACH: ICD-10-PCS | Performed by: INTERNAL MEDICINE

## 2021-05-17 RX ADMIN — PANTOPRAZOLE SODIUM SCH MG: 40 INJECTION, POWDER, FOR SOLUTION INTRAVENOUS at 22:18

## 2021-05-17 RX ADMIN — PANTOPRAZOLE SODIUM SCH MG: 40 INJECTION, POWDER, FOR SOLUTION INTRAVENOUS at 12:37

## 2021-05-17 RX ADMIN — SODIUM CHLORIDE SCH MLS/HR: 9 INJECTION, SOLUTION INTRAVENOUS at 12:37

## 2021-05-18 LAB
ALBUMIN SERPL-MCNC: 2.5 G/DL (ref 3.4–5)
ALP SERPL-CCNC: 41 U/L (ref 45–117)
ALT SERPL-CCNC: 12 U/L (ref 13–61)
ANION GAP SERPL CALC-SCNC: 9 MMOL/L (ref 8–16)
AST SERPL-CCNC: 23 U/L (ref 15–37)
BASOPHILS # BLD: 0.8 % (ref 0–2)
BILIRUB SERPL-MCNC: 0.7 MG/DL (ref 0.2–1)
BUN SERPL-MCNC: 22.1 MG/DL (ref 7–18)
CALCIUM SERPL-MCNC: 7.7 MG/DL (ref 8.5–10.1)
CHLORIDE SERPL-SCNC: 107 MMOL/L (ref 98–107)
CO2 SERPL-SCNC: 22 MMOL/L (ref 21–32)
CREAT SERPL-MCNC: 1.1 MG/DL (ref 0.55–1.3)
DEPRECATED RDW RBC AUTO: 16.4 % (ref 11.9–15.9)
DEPRECATED RDW RBC AUTO: 18.3 % (ref 11.9–15.9)
EOSINOPHIL # BLD: 1.7 % (ref 0–4.5)
GLUCOSE SERPL-MCNC: 151 MG/DL (ref 74–106)
HCT VFR BLD CALC: 21.2 % (ref 35.4–49)
HCT VFR BLD CALC: 26.3 % (ref 35.4–49)
HGB BLD-MCNC: 7.2 GM/DL (ref 11.7–16.9)
HGB BLD-MCNC: 8.8 GM/DL (ref 11.7–16.9)
LYMPHOCYTES # BLD: 20.9 % (ref 8–40)
MAGNESIUM SERPL-MCNC: 1.7 MG/DL (ref 1.8–2.4)
MCH RBC QN AUTO: 27.8 PG (ref 25.7–33.7)
MCH RBC QN AUTO: 27.9 PG (ref 25.7–33.7)
MCHC RBC AUTO-ENTMCNC: 33.5 G/DL (ref 32–35.9)
MCHC RBC AUTO-ENTMCNC: 33.7 G/DL (ref 32–35.9)
MCV RBC: 82.4 FL (ref 80–96)
MCV RBC: 83.1 FL (ref 80–96)
MONOCYTES # BLD AUTO: 11.8 % (ref 3.8–10.2)
NEUTROPHILS # BLD: 64.8 % (ref 42.8–82.8)
PHOSPHATE SERPL-MCNC: 1.5 MG/DL (ref 2.5–4.9)
PLATELET # BLD AUTO: 132 K/MM3 (ref 134–434)
PLATELET # BLD AUTO: 202 K/MM3 (ref 134–434)
PMV BLD: 7.9 FL (ref 7.5–11.1)
PMV BLD: 8 FL (ref 7.5–11.1)
PROT SERPL-MCNC: 5.3 G/DL (ref 6.4–8.2)
RBC # BLD AUTO: 2.58 M/MM3 (ref 4–5.6)
RBC # BLD AUTO: 3.17 M/MM3 (ref 4–5.6)
SODIUM SERPL-SCNC: 138 MMOL/L (ref 136–145)
WBC # BLD AUTO: 5.2 K/MM3 (ref 4–10)
WBC # BLD AUTO: 7.2 K/MM3 (ref 4–10)

## 2021-05-18 RX ADMIN — PANTOPRAZOLE SODIUM SCH MG: 40 INJECTION, POWDER, FOR SOLUTION INTRAVENOUS at 21:05

## 2021-05-18 RX ADMIN — SODIUM CHLORIDE SCH MLS/HR: 9 INJECTION, SOLUTION INTRAVENOUS at 02:22

## 2021-05-18 RX ADMIN — SODIUM CHLORIDE SCH: 9 INJECTION, SOLUTION INTRAVENOUS at 13:28

## 2021-05-18 RX ADMIN — TRAZODONE HYDROCHLORIDE SCH MG: 100 TABLET ORAL at 21:05

## 2021-05-19 LAB
ALBUMIN SERPL-MCNC: 2.4 G/DL (ref 3.4–5)
ALP SERPL-CCNC: 39 U/L (ref 45–117)
ALT SERPL-CCNC: 12 U/L (ref 13–61)
ANION GAP SERPL CALC-SCNC: 9 MMOL/L (ref 8–16)
AST SERPL-CCNC: 16 U/L (ref 15–37)
BASOPHILS # BLD: 0.6 % (ref 0–2)
BILIRUB SERPL-MCNC: 0.6 MG/DL (ref 0.2–1)
BUN SERPL-MCNC: 6.3 MG/DL (ref 7–18)
CALCIUM SERPL-MCNC: 7.3 MG/DL (ref 8.5–10.1)
CHLORIDE SERPL-SCNC: 107 MMOL/L (ref 98–107)
CO2 SERPL-SCNC: 24 MMOL/L (ref 21–32)
CREAT SERPL-MCNC: 0.8 MG/DL (ref 0.55–1.3)
DEPRECATED RDW RBC AUTO: 16.5 % (ref 11.9–15.9)
DEPRECATED RDW RBC AUTO: 16.6 % (ref 11.9–15.9)
DEPRECATED RDW RBC AUTO: 16.8 % (ref 11.9–15.9)
EOSINOPHIL # BLD: 2.2 % (ref 0–4.5)
GLUCOSE SERPL-MCNC: 142 MG/DL (ref 74–106)
HCT VFR BLD CALC: 24.9 % (ref 35.4–49)
HCT VFR BLD CALC: 24.9 % (ref 35.4–49)
HCT VFR BLD CALC: 25.9 % (ref 35.4–49)
HGB BLD-MCNC: 8.3 GM/DL (ref 11.7–16.9)
HGB BLD-MCNC: 8.4 GM/DL (ref 11.7–16.9)
HGB BLD-MCNC: 8.6 GM/DL (ref 11.7–16.9)
LYMPHOCYTES # BLD: 19.9 % (ref 8–40)
MAGNESIUM SERPL-MCNC: 1.6 MG/DL (ref 1.8–2.4)
MCH RBC QN AUTO: 27.8 PG (ref 25.7–33.7)
MCH RBC QN AUTO: 28 PG (ref 25.7–33.7)
MCH RBC QN AUTO: 28.1 PG (ref 25.7–33.7)
MCHC RBC AUTO-ENTMCNC: 33.1 G/DL (ref 32–35.9)
MCHC RBC AUTO-ENTMCNC: 33.3 G/DL (ref 32–35.9)
MCHC RBC AUTO-ENTMCNC: 33.7 G/DL (ref 32–35.9)
MCV RBC: 83.5 FL (ref 80–96)
MCV RBC: 83.8 FL (ref 80–96)
MCV RBC: 83.9 FL (ref 80–96)
MONOCYTES # BLD AUTO: 12.8 % (ref 3.8–10.2)
NEUTROPHILS # BLD: 64.5 % (ref 42.8–82.8)
PHOSPHATE SERPL-MCNC: 2.7 MG/DL (ref 2.5–4.9)
PLATELET # BLD AUTO: 136 K/MM3 (ref 134–434)
PLATELET # BLD AUTO: 147 K/MM3 (ref 134–434)
PLATELET # BLD AUTO: 156 K/MM3 (ref 134–434)
PMV BLD: 8.5 FL (ref 7.5–11.1)
PMV BLD: 8.6 FL (ref 7.5–11.1)
PMV BLD: 8.6 FL (ref 7.5–11.1)
PROT SERPL-MCNC: 5 G/DL (ref 6.4–8.2)
RBC # BLD AUTO: 2.97 M/MM3 (ref 4–5.6)
RBC # BLD AUTO: 2.98 M/MM3 (ref 4–5.6)
RBC # BLD AUTO: 3.09 M/MM3 (ref 4–5.6)
SODIUM SERPL-SCNC: 139 MMOL/L (ref 136–145)
WBC # BLD AUTO: 4.2 K/MM3 (ref 4–10)
WBC # BLD AUTO: 5 K/MM3 (ref 4–10)
WBC # BLD AUTO: 5 K/MM3 (ref 4–10)

## 2021-05-19 PROCEDURE — 0DJD8ZZ INSPECTION OF LOWER INTESTINAL TRACT, VIA NATURAL OR ARTIFICIAL OPENING ENDOSCOPIC: ICD-10-PCS | Performed by: INTERNAL MEDICINE

## 2021-05-19 PROCEDURE — 0DB98ZX EXCISION OF DUODENUM, VIA NATURAL OR ARTIFICIAL OPENING ENDOSCOPIC, DIAGNOSTIC: ICD-10-PCS | Performed by: INTERNAL MEDICINE

## 2021-05-19 PROCEDURE — 0DB68ZX EXCISION OF STOMACH, VIA NATURAL OR ARTIFICIAL OPENING ENDOSCOPIC, DIAGNOSTIC: ICD-10-PCS | Performed by: INTERNAL MEDICINE

## 2021-05-19 RX ADMIN — SODIUM CHLORIDE SCH: 9 INJECTION, SOLUTION INTRAVENOUS at 11:36

## 2021-05-19 RX ADMIN — TRAZODONE HYDROCHLORIDE SCH MG: 100 TABLET ORAL at 22:52

## 2021-05-19 RX ADMIN — CEFTRIAXONE SCH MLS/HR: 1 INJECTION, POWDER, FOR SOLUTION INTRAMUSCULAR; INTRAVENOUS at 12:21

## 2021-05-19 RX ADMIN — MAGNESIUM SULFATE HEPTAHYDRATE ONE GM: 500 INJECTION, SOLUTION INTRAMUSCULAR; INTRAVENOUS at 12:21

## 2021-05-19 RX ADMIN — POTASSIUM CHLORIDE SCH: 7.46 INJECTION, SOLUTION INTRAVENOUS at 11:36

## 2021-05-19 RX ADMIN — CEFTRIAXONE SCH: 1 INJECTION, POWDER, FOR SOLUTION INTRAMUSCULAR; INTRAVENOUS at 11:36

## 2021-05-19 RX ADMIN — POTASSIUM CHLORIDE ONE MEQ: 1500 TABLET, EXTENDED RELEASE ORAL at 12:22

## 2021-05-19 RX ADMIN — POTASSIUM CHLORIDE ONE: 1500 TABLET, EXTENDED RELEASE ORAL at 11:35

## 2021-05-19 RX ADMIN — POTASSIUM CHLORIDE SCH MLS/HR: 7.46 INJECTION, SOLUTION INTRAVENOUS at 12:22

## 2021-05-19 RX ADMIN — PANTOPRAZOLE SODIUM SCH MG: 40 INJECTION, POWDER, FOR SOLUTION INTRAVENOUS at 22:17

## 2021-05-19 RX ADMIN — MAGNESIUM SULFATE HEPTAHYDRATE ONE: 500 INJECTION, SOLUTION INTRAMUSCULAR; INTRAVENOUS at 11:35

## 2021-05-19 RX ADMIN — PANTOPRAZOLE SODIUM SCH: 40 INJECTION, POWDER, FOR SOLUTION INTRAVENOUS at 11:36

## 2021-05-20 VITALS — HEART RATE: 85 BPM | DIASTOLIC BLOOD PRESSURE: 95 MMHG | SYSTOLIC BLOOD PRESSURE: 155 MMHG | TEMPERATURE: 98.8 F

## 2021-05-20 LAB
ALBUMIN SERPL-MCNC: 2.4 G/DL (ref 3.4–5)
ALP SERPL-CCNC: 44 U/L (ref 45–117)
ALT SERPL-CCNC: 11 U/L (ref 13–61)
ANION GAP SERPL CALC-SCNC: 6 MMOL/L (ref 8–16)
AST SERPL-CCNC: 15 U/L (ref 15–37)
BASOPHILS # BLD: 0.7 % (ref 0–2)
BILIRUB SERPL-MCNC: 0.3 MG/DL (ref 0.2–1)
BUN SERPL-MCNC: 4.3 MG/DL (ref 7–18)
CALCIUM SERPL-MCNC: 7.6 MG/DL (ref 8.5–10.1)
CHLORIDE SERPL-SCNC: 108 MMOL/L (ref 98–107)
CO2 SERPL-SCNC: 23 MMOL/L (ref 21–32)
CREAT SERPL-MCNC: 1.1 MG/DL (ref 0.55–1.3)
DEPRECATED RDW RBC AUTO: 16.7 % (ref 11.9–15.9)
DEPRECATED RDW RBC AUTO: 17 % (ref 11.9–15.9)
EOSINOPHIL # BLD: 2.5 % (ref 0–4.5)
GLUCOSE SERPL-MCNC: 258 MG/DL (ref 74–106)
HCT VFR BLD CALC: 23.2 % (ref 35.4–49)
HCT VFR BLD CALC: 24.2 % (ref 35.4–49)
HGB BLD-MCNC: 7.9 GM/DL (ref 11.7–16.9)
HGB BLD-MCNC: 8.2 GM/DL (ref 11.7–16.9)
LYMPHOCYTES # BLD: 18.3 % (ref 8–40)
MCH RBC QN AUTO: 28.3 PG (ref 25.7–33.7)
MCH RBC QN AUTO: 28.5 PG (ref 25.7–33.7)
MCHC RBC AUTO-ENTMCNC: 34 G/DL (ref 32–35.9)
MCHC RBC AUTO-ENTMCNC: 34.1 G/DL (ref 32–35.9)
MCV RBC: 83.4 FL (ref 80–96)
MCV RBC: 83.4 FL (ref 80–96)
MONOCYTES # BLD AUTO: 11.6 % (ref 3.8–10.2)
NEUTROPHILS # BLD: 66.9 % (ref 42.8–82.8)
PLATELET # BLD AUTO: 134 K/MM3 (ref 134–434)
PLATELET # BLD AUTO: 144 K/MM3 (ref 134–434)
PMV BLD: 8.1 FL (ref 7.5–11.1)
PMV BLD: 8.3 FL (ref 7.5–11.1)
PROT SERPL-MCNC: 5 G/DL (ref 6.4–8.2)
RBC # BLD AUTO: 2.78 M/MM3 (ref 4–5.6)
RBC # BLD AUTO: 2.9 M/MM3 (ref 4–5.6)
SODIUM SERPL-SCNC: 137 MMOL/L (ref 136–145)
WBC # BLD AUTO: 3.3 K/MM3 (ref 4–10)
WBC # BLD AUTO: 3.9 K/MM3 (ref 4–10)

## 2021-05-20 RX ADMIN — CEFTRIAXONE SCH MLS/HR: 1 INJECTION, POWDER, FOR SOLUTION INTRAMUSCULAR; INTRAVENOUS at 09:51

## 2021-05-20 RX ADMIN — PANTOPRAZOLE SODIUM SCH MG: 40 INJECTION, POWDER, FOR SOLUTION INTRAVENOUS at 09:52

## 2021-05-20 RX ADMIN — SODIUM CHLORIDE SCH: 9 INJECTION, SOLUTION INTRAVENOUS at 18:53

## 2021-05-20 RX ADMIN — TRAZODONE HYDROCHLORIDE SCH MG: 100 TABLET ORAL at 22:00

## 2021-05-20 RX ADMIN — TRAZODONE HYDROCHLORIDE SCH MG: 100 TABLET ORAL at 23:06

## 2021-05-20 RX ADMIN — PANTOPRAZOLE SODIUM SCH: 40 INJECTION, POWDER, FOR SOLUTION INTRAVENOUS at 22:06

## 2021-08-25 ENCOUNTER — HOSPITAL ENCOUNTER (INPATIENT)
Dept: HOSPITAL 74 - JER | Age: 66
LOS: 1 days | Discharge: LEFT BEFORE DISCHARGE | DRG: 378 | End: 2021-08-26
Attending: INTERNAL MEDICINE | Admitting: INTERNAL MEDICINE
Payer: COMMERCIAL

## 2021-08-25 VITALS — BODY MASS INDEX: 25.2 KG/M2

## 2021-08-25 DIAGNOSIS — I10: ICD-10-CM

## 2021-08-25 DIAGNOSIS — F41.8: ICD-10-CM

## 2021-08-25 DIAGNOSIS — K44.9: ICD-10-CM

## 2021-08-25 DIAGNOSIS — F10.10: ICD-10-CM

## 2021-08-25 DIAGNOSIS — K92.2: Primary | ICD-10-CM

## 2021-08-25 DIAGNOSIS — D62: ICD-10-CM

## 2021-08-25 LAB
ALBUMIN SERPL-MCNC: 2.7 G/DL (ref 3.4–5)
ALBUMIN SERPL-MCNC: 2.9 G/DL (ref 3.4–5)
ALP SERPL-CCNC: 32 U/L (ref 45–117)
ALP SERPL-CCNC: 33 U/L (ref 45–117)
ALT SERPL-CCNC: 17 U/L (ref 13–61)
ALT SERPL-CCNC: 22 U/L (ref 13–61)
ANION GAP SERPL CALC-SCNC: 15 MMOL/L (ref 8–16)
ANION GAP SERPL CALC-SCNC: 9 MMOL/L (ref 8–16)
ANISOCYTOSIS BLD QL: (no result)
APPEARANCE UR: CLEAR
APTT BLD: 23.3 SECONDS (ref 25.2–36.5)
AST SERPL-CCNC: 24 U/L (ref 15–37)
AST SERPL-CCNC: 38 U/L (ref 15–37)
BACTERIA # UR AUTO: 58 /UL (ref 0–1359)
BASOPHILS # BLD: 1.4 % (ref 0–2)
BILIRUB SERPL-MCNC: 0.4 MG/DL (ref 0.2–1)
BILIRUB SERPL-MCNC: 0.5 MG/DL (ref 0.2–1)
BILIRUB UR STRIP.AUTO-MCNC: NEGATIVE MG/DL
BUN SERPL-MCNC: 32.5 MG/DL (ref 7–18)
BUN SERPL-MCNC: 32.7 MG/DL (ref 7–18)
CALCIUM SERPL-MCNC: 7.8 MG/DL (ref 8.5–10.1)
CALCIUM SERPL-MCNC: 8.5 MG/DL (ref 8.5–10.1)
CASTS URNS QL MICRO: 2 /UL (ref 0–3.1)
CHLORIDE SERPL-SCNC: 101 MMOL/L (ref 98–107)
CHLORIDE SERPL-SCNC: 103 MMOL/L (ref 98–107)
CO2 SERPL-SCNC: 17 MMOL/L (ref 21–32)
CO2 SERPL-SCNC: 22 MMOL/L (ref 21–32)
COLOR UR: YELLOW
CREAT SERPL-MCNC: 1.1 MG/DL (ref 0.55–1.3)
CREAT SERPL-MCNC: 1.1 MG/DL (ref 0.55–1.3)
DEPRECATED RDW RBC AUTO: 26.5 % (ref 11.9–15.9)
DEPRECATED RDW RBC AUTO: 26.7 % (ref 11.9–15.9)
EOSINOPHIL # BLD: 0.2 % (ref 0–4.5)
EPITH CASTS URNS QL MICRO: 13 /UL (ref 0–25.1)
GLUCOSE SERPL-MCNC: 154 MG/DL (ref 74–106)
GLUCOSE SERPL-MCNC: 180 MG/DL (ref 74–106)
HCT VFR BLD CALC: 25.8 % (ref 35.4–49)
HCT VFR BLD CALC: 27.7 % (ref 35.4–49)
HGB BLD-MCNC: 8.5 GM/DL (ref 11.7–16.9)
HGB BLD-MCNC: 9 GM/DL (ref 11.7–16.9)
INR BLD: 1.03 (ref 0.83–1.09)
KETONES UR QL STRIP: (no result)
LEUKOCYTE ESTERASE UR QL STRIP.AUTO: NEGATIVE
LYMPHOCYTES # BLD: 24.4 % (ref 8–40)
MACROCYTES BLD QL: (no result)
MCH RBC QN AUTO: 27.8 PG (ref 25.7–33.7)
MCH RBC QN AUTO: 28.1 PG (ref 25.7–33.7)
MCHC RBC AUTO-ENTMCNC: 32.4 G/DL (ref 32–35.9)
MCHC RBC AUTO-ENTMCNC: 33 G/DL (ref 32–35.9)
MCV RBC: 84.3 FL (ref 80–96)
MCV RBC: 86.6 FL (ref 80–96)
MONOCYTES # BLD AUTO: 8.9 % (ref 3.8–10.2)
NEUTROPHILS # BLD: 65.1 % (ref 42.8–82.8)
NITRITE UR QL STRIP: NEGATIVE
OVALOCYTES BLD QL SMEAR: (no result)
PH UR: 5 [PH] (ref 5–8)
PLATELET # BLD AUTO: 197 10^3/UL (ref 134–434)
PLATELET # BLD AUTO: 251 10^3/UL (ref 134–434)
PLATELET BLD QL SMEAR: NORMAL
PMV BLD: 8.3 FL (ref 7.5–11.1)
PMV BLD: 8.5 FL (ref 7.5–11.1)
PROT SERPL-MCNC: 5.7 G/DL (ref 6.4–8.2)
PROT SERPL-MCNC: 6.8 G/DL (ref 6.4–8.2)
PROT UR QL STRIP: (no result)
PROT UR QL STRIP: NEGATIVE
PT PNL PPP: 12.6 SEC (ref 9.7–13)
RBC # BLD AUTO: 13 /UL (ref 0–23.9)
RBC # BLD AUTO: 3.06 M/MM3 (ref 4–5.6)
RBC # BLD AUTO: 3.19 M/MM3 (ref 4–5.6)
SODIUM SERPL-SCNC: 134 MMOL/L (ref 136–145)
SODIUM SERPL-SCNC: 135 MMOL/L (ref 136–145)
SP GR UR: 1.02 (ref 1.01–1.03)
UROBILINOGEN UR STRIP-MCNC: 0.2 MG/DL (ref 0.2–1)
WBC # BLD AUTO: 5.3 K/MM3 (ref 4–10)
WBC # BLD AUTO: 6.1 K/MM3 (ref 4–10)
WBC # UR AUTO: 29 /UL (ref 0–25.8)

## 2021-08-25 PROCEDURE — U0003 INFECTIOUS AGENT DETECTION BY NUCLEIC ACID (DNA OR RNA); SEVERE ACUTE RESPIRATORY SYNDROME CORONAVIRUS 2 (SARS-COV-2) (CORONAVIRUS DISEASE [COVID-19]), AMPLIFIED PROBE TECHNIQUE, MAKING USE OF HIGH THROUGHPUT TECHNOLOGIES AS DESCRIBED BY CMS-2020-01-R: HCPCS

## 2021-08-25 PROCEDURE — U0005 INFEC AGEN DETEC AMPLI PROBE: HCPCS

## 2021-08-25 PROCEDURE — C9803 HOPD COVID-19 SPEC COLLECT: HCPCS

## 2021-08-25 PROCEDURE — G0378 HOSPITAL OBSERVATION PER HR: HCPCS

## 2021-08-25 RX ADMIN — PANTOPRAZOLE SODIUM SCH MG: 40 INJECTION, POWDER, FOR SOLUTION INTRAVENOUS at 23:41

## 2021-08-25 RX ADMIN — SODIUM CHLORIDE SCH MLS/HR: 9 INJECTION, SOLUTION INTRAVENOUS at 23:42

## 2021-08-25 RX ADMIN — SODIUM CHLORIDE SCH MLS/HR: 9 INJECTION, SOLUTION INTRAVENOUS at 19:07

## 2021-08-26 VITALS — HEART RATE: 94 BPM | DIASTOLIC BLOOD PRESSURE: 80 MMHG | SYSTOLIC BLOOD PRESSURE: 136 MMHG

## 2021-08-26 VITALS — TEMPERATURE: 98.6 F

## 2021-08-26 LAB
ALBUMIN SERPL-MCNC: 2.6 G/DL (ref 3.4–5)
ALP SERPL-CCNC: 30 U/L (ref 45–117)
ALT SERPL-CCNC: 15 U/L (ref 13–61)
ANION GAP SERPL CALC-SCNC: 7 MMOL/L (ref 8–16)
AST SERPL-CCNC: 23 U/L (ref 15–37)
BASOPHILS # BLD: 1.1 % (ref 0–2)
BILIRUB SERPL-MCNC: 0.6 MG/DL (ref 0.2–1)
BUN SERPL-MCNC: 24 MG/DL (ref 7–18)
CALCIUM SERPL-MCNC: 7.7 MG/DL (ref 8.5–10.1)
CHLORIDE SERPL-SCNC: 107 MMOL/L (ref 98–107)
CO2 SERPL-SCNC: 25 MMOL/L (ref 21–32)
CREAT SERPL-MCNC: 0.9 MG/DL (ref 0.55–1.3)
DEPRECATED RDW RBC AUTO: 26.7 % (ref 11.9–15.9)
DEPRECATED RDW RBC AUTO: 26.8 % (ref 11.9–15.9)
EOSINOPHIL # BLD: 1.7 % (ref 0–4.5)
GLUCOSE SERPL-MCNC: 131 MG/DL (ref 74–106)
HCT VFR BLD CALC: 22.8 % (ref 35.4–49)
HCT VFR BLD CALC: 23.2 % (ref 35.4–49)
HGB BLD-MCNC: 7.5 GM/DL (ref 11.7–16.9)
HGB BLD-MCNC: 7.7 GM/DL (ref 11.7–16.9)
LYMPHOCYTES # BLD: 22.8 % (ref 8–40)
MAGNESIUM SERPL-MCNC: 2 MG/DL (ref 1.8–2.4)
MCH RBC QN AUTO: 28 PG (ref 25.7–33.7)
MCH RBC QN AUTO: 28.1 PG (ref 25.7–33.7)
MCHC RBC AUTO-ENTMCNC: 32.8 G/DL (ref 32–35.9)
MCHC RBC AUTO-ENTMCNC: 33.2 G/DL (ref 32–35.9)
MCV RBC: 84.3 FL (ref 80–96)
MCV RBC: 85.8 FL (ref 80–96)
MONOCYTES # BLD AUTO: 10.7 % (ref 3.8–10.2)
NEUTROPHILS # BLD: 63.7 % (ref 42.8–82.8)
PHOSPHATE SERPL-MCNC: 2 MG/DL (ref 2.5–4.9)
PLATELET # BLD AUTO: 112 10^3/UL (ref 134–434)
PLATELET # BLD AUTO: 112 10^3/UL (ref 134–434)
PMV BLD: 8.1 FL (ref 7.5–11.1)
PMV BLD: 8.5 FL (ref 7.5–11.1)
PROT SERPL-MCNC: 5.3 G/DL (ref 6.4–8.2)
RBC # BLD AUTO: 2.66 M/MM3 (ref 4–5.6)
RBC # BLD AUTO: 2.75 M/MM3 (ref 4–5.6)
SODIUM SERPL-SCNC: 139 MMOL/L (ref 136–145)
WBC # BLD AUTO: 3.1 K/MM3 (ref 4–10)
WBC # BLD AUTO: 3.3 K/MM3 (ref 4–10)

## 2021-08-26 RX ADMIN — INSULIN ASPART SCH: 100 INJECTION, SOLUTION INTRAVENOUS; SUBCUTANEOUS at 18:02

## 2021-08-26 RX ADMIN — INSULIN ASPART SCH: 100 INJECTION, SOLUTION INTRAVENOUS; SUBCUTANEOUS at 00:08

## 2021-08-26 RX ADMIN — INSULIN ASPART SCH UNITS: 100 INJECTION, SOLUTION INTRAVENOUS; SUBCUTANEOUS at 11:57

## 2021-08-26 RX ADMIN — PANTOPRAZOLE SODIUM SCH MG: 40 INJECTION, POWDER, FOR SOLUTION INTRAVENOUS at 09:21

## 2021-08-26 RX ADMIN — SODIUM CHLORIDE SCH MLS/HR: 9 INJECTION, SOLUTION INTRAVENOUS at 12:51

## 2021-08-26 RX ADMIN — INSULIN ASPART SCH: 100 INJECTION, SOLUTION INTRAVENOUS; SUBCUTANEOUS at 06:11

## 2023-07-16 ENCOUNTER — HOSPITAL ENCOUNTER (EMERGENCY)
Dept: HOSPITAL 74 - JER | Age: 68
Discharge: HOME | End: 2023-07-16
Payer: COMMERCIAL

## 2023-07-16 VITALS
RESPIRATION RATE: 18 BRPM | TEMPERATURE: 98.3 F | SYSTOLIC BLOOD PRESSURE: 130 MMHG | HEART RATE: 108 BPM | DIASTOLIC BLOOD PRESSURE: 82 MMHG

## 2023-07-16 VITALS — BODY MASS INDEX: 31.4 KG/M2

## 2023-07-16 DIAGNOSIS — R20.2: Primary | ICD-10-CM
